# Patient Record
Sex: FEMALE | Race: WHITE
[De-identification: names, ages, dates, MRNs, and addresses within clinical notes are randomized per-mention and may not be internally consistent; named-entity substitution may affect disease eponyms.]

---

## 2017-04-17 ENCOUNTER — HOSPITAL ENCOUNTER (INPATIENT)
Dept: HOSPITAL 15 - ER | Age: 57
LOS: 5 days | Discharge: HOME | DRG: 133 | End: 2017-04-22
Attending: INTERNAL MEDICINE | Admitting: INTERNAL MEDICINE
Payer: MEDICAID

## 2017-04-17 VITALS — WEIGHT: 183.87 LBS | HEIGHT: 63 IN | BODY MASS INDEX: 32.58 KG/M2

## 2017-04-17 VITALS — DIASTOLIC BLOOD PRESSURE: 39 MMHG | SYSTOLIC BLOOD PRESSURE: 109 MMHG

## 2017-04-17 VITALS — SYSTOLIC BLOOD PRESSURE: 119 MMHG | DIASTOLIC BLOOD PRESSURE: 67 MMHG

## 2017-04-17 DIAGNOSIS — F17.210: ICD-10-CM

## 2017-04-17 DIAGNOSIS — Z82.3: ICD-10-CM

## 2017-04-17 DIAGNOSIS — E78.5: ICD-10-CM

## 2017-04-17 DIAGNOSIS — I42.9: ICD-10-CM

## 2017-04-17 DIAGNOSIS — E44.0: ICD-10-CM

## 2017-04-17 DIAGNOSIS — F41.9: ICD-10-CM

## 2017-04-17 DIAGNOSIS — T38.0X5A: ICD-10-CM

## 2017-04-17 DIAGNOSIS — Z88.0: ICD-10-CM

## 2017-04-17 DIAGNOSIS — E11.9: ICD-10-CM

## 2017-04-17 DIAGNOSIS — Z79.891: ICD-10-CM

## 2017-04-17 DIAGNOSIS — J96.20: Primary | ICD-10-CM

## 2017-04-17 DIAGNOSIS — J44.1: ICD-10-CM

## 2017-04-17 DIAGNOSIS — Z71.6: ICD-10-CM

## 2017-04-17 DIAGNOSIS — Z86.73: ICD-10-CM

## 2017-04-17 DIAGNOSIS — I15.9: ICD-10-CM

## 2017-04-17 DIAGNOSIS — Z95.1: ICD-10-CM

## 2017-04-17 DIAGNOSIS — F32.9: ICD-10-CM

## 2017-04-17 DIAGNOSIS — Z91.19: ICD-10-CM

## 2017-04-17 DIAGNOSIS — I11.0: ICD-10-CM

## 2017-04-17 DIAGNOSIS — I48.0: ICD-10-CM

## 2017-04-17 DIAGNOSIS — Z80.9: ICD-10-CM

## 2017-04-17 DIAGNOSIS — G89.29: ICD-10-CM

## 2017-04-17 DIAGNOSIS — Z83.3: ICD-10-CM

## 2017-04-17 DIAGNOSIS — I25.10: ICD-10-CM

## 2017-04-17 DIAGNOSIS — I50.23: ICD-10-CM

## 2017-04-17 LAB
ALBUMIN SERPL-MCNC: 2.9 G/DL (ref 3.4–5)
ALP SERPL-CCNC: 106 U/L (ref 45–117)
ANION GAP SERPL CALCULATED.3IONS-SCNC: 11 MMOL/L (ref 5–15)
BILIRUB SERPL-MCNC: 0.3 MG/DL (ref 0.2–1)
BNP SERPL-MCNC: 249.16 PG/ML (ref 0–100)
BUN SERPL-MCNC: 21 MG/DL (ref 7–18)
BUN/CREAT SERPL: 28.8
CALCIUM SERPL-MCNC: 8.7 MG/DL (ref 8.5–10.1)
CHLORIDE SERPL-SCNC: 108 MMOL/L (ref 98–107)
CO2 SERPL-SCNC: 28 MMOL/L (ref 21–32)
DEFINITIVE: (no result)
GLUCOSE SERPL-MCNC: 121 MG/DL (ref 74–106)
HCT VFR BLD AUTO: 39.6 % (ref 36–46)
HGB BLD-MCNC: 12.9 G/DL (ref 12.2–16.2)
LACTATE PLASV-SCNC: 3.1 MMOL/L (ref 0.4–2)
MAGNESIUM SERPL-MCNC: 2.4 MG/DL (ref 1.6–2.6)
MCH RBC QN AUTO: 26 PG (ref 28–32)
MCV RBC AUTO: 79.7 FL (ref 80–100)
NRBC BLD QL AUTO: 0 %
POTASSIUM SERPL-SCNC: 4.4 MMOL/L (ref 3.5–5.1)
PROT SERPL-MCNC: 7 G/DL (ref 6.4–8.2)
REFLEX LACTIC ACID YES OR NO: YES
SODIUM SERPL-SCNC: 147 MMOL/L (ref 136–145)
TEMPERATURE:: 24.6 C (ref 20–25)

## 2017-04-17 PROCEDURE — 75571 CT HRT W/O DYE W/CA TEST: CPT

## 2017-04-17 PROCEDURE — 85730 THROMBOPLASTIN TIME PARTIAL: CPT

## 2017-04-17 PROCEDURE — 83735 ASSAY OF MAGNESIUM: CPT

## 2017-04-17 PROCEDURE — 96375 TX/PRO/DX INJ NEW DRUG ADDON: CPT

## 2017-04-17 PROCEDURE — 87040 BLOOD CULTURE FOR BACTERIA: CPT

## 2017-04-17 PROCEDURE — 75574 CT ANGIO HRT W/3D IMAGE: CPT

## 2017-04-17 PROCEDURE — 80048 BASIC METABOLIC PNL TOTAL CA: CPT

## 2017-04-17 PROCEDURE — 85610 PROTHROMBIN TIME: CPT

## 2017-04-17 PROCEDURE — 81001 URINALYSIS AUTO W/SCOPE: CPT

## 2017-04-17 PROCEDURE — 96365 THER/PROPH/DIAG IV INF INIT: CPT

## 2017-04-17 PROCEDURE — 83880 ASSAY OF NATRIURETIC PEPTIDE: CPT

## 2017-04-17 PROCEDURE — 36415 COLL VENOUS BLD VENIPUNCTURE: CPT

## 2017-04-17 PROCEDURE — 80053 COMPREHEN METABOLIC PANEL: CPT

## 2017-04-17 PROCEDURE — 83605 ASSAY OF LACTIC ACID: CPT

## 2017-04-17 PROCEDURE — 71020: CPT

## 2017-04-17 PROCEDURE — 94761 N-INVAS EAR/PLS OXIMETRY MLT: CPT

## 2017-04-17 PROCEDURE — 85025 COMPLETE CBC W/AUTO DIFF WBC: CPT

## 2017-04-17 PROCEDURE — 85379 FIBRIN DEGRADATION QUANT: CPT

## 2017-04-17 PROCEDURE — 93005 ELECTROCARDIOGRAM TRACING: CPT

## 2017-04-17 PROCEDURE — 87081 CULTURE SCREEN ONLY: CPT

## 2017-04-17 PROCEDURE — 84484 ASSAY OF TROPONIN QUANT: CPT

## 2017-04-17 PROCEDURE — 93306 TTE W/DOPPLER COMPLETE: CPT

## 2017-04-17 PROCEDURE — 94640 AIRWAY INHALATION TREATMENT: CPT

## 2017-04-17 RX ADMIN — IPRATROPIUM BROMIDE SCH MG: 0.5 SOLUTION RESPIRATORY (INHALATION) at 19:41

## 2017-04-17 RX ADMIN — CARVEDILOL SCH MG: 3.12 TABLET, FILM COATED ORAL at 22:00

## 2017-04-17 RX ADMIN — ALBUTEROL SULFATE SCH MG: 2.5 SOLUTION RESPIRATORY (INHALATION) at 19:41

## 2017-04-17 RX ADMIN — APIXABAN SCH MG: 5 TABLET, FILM COATED ORAL at 22:38

## 2017-04-17 RX ADMIN — HYDROCODONE BITARTRATE AND ACETAMINOPHEN PRN TAB: 10; 325 TABLET ORAL at 22:46

## 2017-04-17 RX ADMIN — METHYLPREDNISOLONE SODIUM SUCCINATE SCH MG: 40 INJECTION, POWDER, FOR SOLUTION INTRAMUSCULAR; INTRAVENOUS at 22:39

## 2017-04-17 RX ADMIN — ATORVASTATIN CALCIUM SCH MG: 20 TABLET, FILM COATED ORAL at 22:38

## 2017-04-18 VITALS — DIASTOLIC BLOOD PRESSURE: 39 MMHG | SYSTOLIC BLOOD PRESSURE: 109 MMHG

## 2017-04-18 VITALS — SYSTOLIC BLOOD PRESSURE: 116 MMHG | DIASTOLIC BLOOD PRESSURE: 74 MMHG

## 2017-04-18 VITALS — DIASTOLIC BLOOD PRESSURE: 63 MMHG | SYSTOLIC BLOOD PRESSURE: 96 MMHG

## 2017-04-18 VITALS — SYSTOLIC BLOOD PRESSURE: 107 MMHG | DIASTOLIC BLOOD PRESSURE: 73 MMHG

## 2017-04-18 VITALS — DIASTOLIC BLOOD PRESSURE: 68 MMHG | SYSTOLIC BLOOD PRESSURE: 115 MMHG

## 2017-04-18 VITALS — SYSTOLIC BLOOD PRESSURE: 96 MMHG | DIASTOLIC BLOOD PRESSURE: 63 MMHG

## 2017-04-18 VITALS — SYSTOLIC BLOOD PRESSURE: 126 MMHG | DIASTOLIC BLOOD PRESSURE: 80 MMHG

## 2017-04-18 LAB
ANION GAP SERPL CALCULATED.3IONS-SCNC: 9 MMOL/L (ref 5–15)
BUN SERPL-MCNC: 18 MG/DL (ref 7–18)
BUN/CREAT SERPL: 20.7
CALCIUM SERPL-MCNC: 8.6 MG/DL (ref 8.5–10.1)
CHLORIDE SERPL-SCNC: 106 MMOL/L (ref 98–107)
CO2 SERPL-SCNC: 27 MMOL/L (ref 21–32)
DEFINITIVE: (no result)
GLUCOSE SERPL-MCNC: 110 MG/DL (ref 74–106)
HCT VFR BLD AUTO: 41 % (ref 36–46)
HGB BLD-MCNC: 13.4 G/DL (ref 12.2–16.2)
MCH RBC QN AUTO: 26 PG (ref 28–32)
MCV RBC AUTO: 79.8 FL (ref 80–100)
NRBC BLD QL AUTO: 0 %
POTASSIUM SERPL-SCNC: 4 MMOL/L (ref 3.5–5.1)
SODIUM SERPL-SCNC: 142 MMOL/L (ref 136–145)
SUSPECT: (no result)

## 2017-04-18 RX ADMIN — APIXABAN SCH MG: 5 TABLET, FILM COATED ORAL at 11:22

## 2017-04-18 RX ADMIN — METHYLPREDNISOLONE SODIUM SUCCINATE SCH MG: 40 INJECTION, POWDER, FOR SOLUTION INTRAMUSCULAR; INTRAVENOUS at 11:21

## 2017-04-18 RX ADMIN — CARVEDILOL SCH MG: 3.12 TABLET, FILM COATED ORAL at 14:19

## 2017-04-18 RX ADMIN — CEFTRIAXONE SODIUM SCH MLS/HR: 1 INJECTION, POWDER, FOR SOLUTION INTRAMUSCULAR; INTRAVENOUS at 09:01

## 2017-04-18 RX ADMIN — ATORVASTATIN CALCIUM SCH MG: 20 TABLET, FILM COATED ORAL at 21:56

## 2017-04-18 RX ADMIN — CARVEDILOL SCH MG: 3.12 TABLET, FILM COATED ORAL at 21:56

## 2017-04-18 RX ADMIN — IPRATROPIUM BROMIDE SCH MG: 0.5 SOLUTION RESPIRATORY (INHALATION) at 14:26

## 2017-04-18 RX ADMIN — METHYLPREDNISOLONE SODIUM SUCCINATE SCH MG: 40 INJECTION, POWDER, FOR SOLUTION INTRAMUSCULAR; INTRAVENOUS at 21:56

## 2017-04-18 RX ADMIN — ALBUTEROL SULFATE SCH MG: 2.5 SOLUTION RESPIRATORY (INHALATION) at 18:40

## 2017-04-18 RX ADMIN — ALBUTEROL SULFATE SCH MG: 2.5 SOLUTION RESPIRATORY (INHALATION) at 14:26

## 2017-04-18 RX ADMIN — IPRATROPIUM BROMIDE SCH MG: 0.5 SOLUTION RESPIRATORY (INHALATION) at 05:58

## 2017-04-18 RX ADMIN — ALBUTEROL SULFATE SCH MG: 2.5 SOLUTION RESPIRATORY (INHALATION) at 05:59

## 2017-04-18 RX ADMIN — CARVEDILOL SCH MG: 3.12 TABLET, FILM COATED ORAL at 14:23

## 2017-04-18 RX ADMIN — FUROSEMIDE SCH MG: 10 INJECTION, SOLUTION INTRAMUSCULAR; INTRAVENOUS at 11:28

## 2017-04-18 RX ADMIN — LOSARTAN POTASSIUM SCH MG: 25 TABLET, FILM COATED ORAL at 11:29

## 2017-04-18 RX ADMIN — IPRATROPIUM BROMIDE SCH MG: 0.5 SOLUTION RESPIRATORY (INHALATION) at 10:00

## 2017-04-18 RX ADMIN — RANOLAZINE SCH MG: 500 TABLET, FILM COATED, EXTENDED RELEASE ORAL at 21:56

## 2017-04-18 RX ADMIN — RANOLAZINE SCH MG: 500 TABLET, FILM COATED, EXTENDED RELEASE ORAL at 11:23

## 2017-04-18 RX ADMIN — IPRATROPIUM BROMIDE SCH MG: 0.5 SOLUTION RESPIRATORY (INHALATION) at 18:40

## 2017-04-18 RX ADMIN — ALBUTEROL SULFATE SCH MG: 2.5 SOLUTION RESPIRATORY (INHALATION) at 10:00

## 2017-04-18 RX ADMIN — POTASSIUM CHLORIDE SCH MEQ: 1500 TABLET, EXTENDED RELEASE ORAL at 11:23

## 2017-04-18 RX ADMIN — HYDROCODONE BITARTRATE AND ACETAMINOPHEN PRN TAB: 10; 325 TABLET ORAL at 22:13

## 2017-04-18 RX ADMIN — AZITHROMYCIN DIHYDRATE SCH MLS/HR: 500 INJECTION, POWDER, LYOPHILIZED, FOR SOLUTION INTRAVENOUS at 11:21

## 2017-04-18 RX ADMIN — APIXABAN SCH MG: 5 TABLET, FILM COATED ORAL at 21:56

## 2017-04-18 RX ADMIN — CARVEDILOL SCH MG: 3.12 TABLET, FILM COATED ORAL at 11:28

## 2017-04-18 RX ADMIN — HYDROCODONE BITARTRATE AND ACETAMINOPHEN PRN TAB: 10; 325 TABLET ORAL at 14:28

## 2017-04-19 VITALS — DIASTOLIC BLOOD PRESSURE: 55 MMHG | SYSTOLIC BLOOD PRESSURE: 111 MMHG

## 2017-04-19 VITALS — SYSTOLIC BLOOD PRESSURE: 122 MMHG | DIASTOLIC BLOOD PRESSURE: 70 MMHG

## 2017-04-19 VITALS — DIASTOLIC BLOOD PRESSURE: 77 MMHG | SYSTOLIC BLOOD PRESSURE: 116 MMHG

## 2017-04-19 VITALS — SYSTOLIC BLOOD PRESSURE: 98 MMHG | DIASTOLIC BLOOD PRESSURE: 53 MMHG

## 2017-04-19 VITALS — DIASTOLIC BLOOD PRESSURE: 74 MMHG | SYSTOLIC BLOOD PRESSURE: 124 MMHG

## 2017-04-19 VITALS — DIASTOLIC BLOOD PRESSURE: 53 MMHG | SYSTOLIC BLOOD PRESSURE: 98 MMHG

## 2017-04-19 LAB
ANION GAP SERPL CALCULATED.3IONS-SCNC: 9 MMOL/L (ref 5–15)
BUN SERPL-MCNC: 22 MG/DL (ref 7–18)
BUN/CREAT SERPL: 27.8
CALCIUM SERPL-MCNC: 8.5 MG/DL (ref 8.5–10.1)
CHLORIDE SERPL-SCNC: 101 MMOL/L (ref 98–107)
CO2 SERPL-SCNC: 32 MMOL/L (ref 21–32)
DEFINITIVE: (no result)
GLUCOSE SERPL-MCNC: 137 MG/DL (ref 74–106)
HCT VFR BLD AUTO: 41.1 % (ref 36–46)
HGB BLD-MCNC: 13.2 G/DL (ref 12.2–16.2)
MCH RBC QN AUTO: 26 PG (ref 28–32)
MCV RBC AUTO: 80.6 FL (ref 80–100)
NRBC BLD QL AUTO: 0 %
POTASSIUM SERPL-SCNC: 4 MMOL/L (ref 3.5–5.1)
SODIUM SERPL-SCNC: 142 MMOL/L (ref 136–145)

## 2017-04-19 RX ADMIN — APIXABAN SCH MG: 5 TABLET, FILM COATED ORAL at 21:04

## 2017-04-19 RX ADMIN — IPRATROPIUM BROMIDE SCH MG: 0.5 SOLUTION RESPIRATORY (INHALATION) at 14:32

## 2017-04-19 RX ADMIN — ALBUTEROL SULFATE SCH MG: 2.5 SOLUTION RESPIRATORY (INHALATION) at 09:49

## 2017-04-19 RX ADMIN — ATORVASTATIN CALCIUM SCH MG: 20 TABLET, FILM COATED ORAL at 21:04

## 2017-04-19 RX ADMIN — METHYLPREDNISOLONE SODIUM SUCCINATE SCH MG: 40 INJECTION, POWDER, FOR SOLUTION INTRAMUSCULAR; INTRAVENOUS at 09:05

## 2017-04-19 RX ADMIN — FUROSEMIDE SCH MG: 10 INJECTION, SOLUTION INTRAMUSCULAR; INTRAVENOUS at 09:05

## 2017-04-19 RX ADMIN — ALBUTEROL SULFATE SCH MG: 2.5 SOLUTION RESPIRATORY (INHALATION) at 05:52

## 2017-04-19 RX ADMIN — RANOLAZINE SCH MG: 500 TABLET, FILM COATED, EXTENDED RELEASE ORAL at 09:16

## 2017-04-19 RX ADMIN — HYDROCODONE BITARTRATE AND ACETAMINOPHEN PRN TAB: 10; 325 TABLET ORAL at 05:52

## 2017-04-19 RX ADMIN — POTASSIUM CHLORIDE SCH MEQ: 1500 TABLET, EXTENDED RELEASE ORAL at 09:04

## 2017-04-19 RX ADMIN — CARVEDILOL SCH MG: 3.12 TABLET, FILM COATED ORAL at 09:06

## 2017-04-19 RX ADMIN — HYDROCODONE BITARTRATE AND ACETAMINOPHEN PRN TAB: 10; 325 TABLET ORAL at 21:05

## 2017-04-19 RX ADMIN — IPRATROPIUM BROMIDE SCH MG: 0.5 SOLUTION RESPIRATORY (INHALATION) at 05:52

## 2017-04-19 RX ADMIN — LOSARTAN POTASSIUM SCH MG: 25 TABLET, FILM COATED ORAL at 10:00

## 2017-04-19 RX ADMIN — ALBUTEROL SULFATE SCH MG: 2.5 SOLUTION RESPIRATORY (INHALATION) at 14:33

## 2017-04-19 RX ADMIN — ALBUTEROL SULFATE SCH MG: 2.5 SOLUTION RESPIRATORY (INHALATION) at 19:36

## 2017-04-19 RX ADMIN — APIXABAN SCH MG: 5 TABLET, FILM COATED ORAL at 09:05

## 2017-04-19 RX ADMIN — RANOLAZINE SCH MG: 500 TABLET, FILM COATED, EXTENDED RELEASE ORAL at 21:04

## 2017-04-19 RX ADMIN — AZITHROMYCIN DIHYDRATE SCH MLS/HR: 500 INJECTION, POWDER, LYOPHILIZED, FOR SOLUTION INTRAVENOUS at 09:19

## 2017-04-19 RX ADMIN — IPRATROPIUM BROMIDE SCH MG: 0.5 SOLUTION RESPIRATORY (INHALATION) at 09:49

## 2017-04-19 RX ADMIN — CARVEDILOL SCH MG: 3.12 TABLET, FILM COATED ORAL at 21:04

## 2017-04-19 RX ADMIN — IPRATROPIUM BROMIDE SCH MG: 0.5 SOLUTION RESPIRATORY (INHALATION) at 19:36

## 2017-04-19 RX ADMIN — CEFTRIAXONE SODIUM SCH MLS/HR: 1 INJECTION, POWDER, FOR SOLUTION INTRAMUSCULAR; INTRAVENOUS at 09:05

## 2017-04-19 RX ADMIN — METHYLPREDNISOLONE SODIUM SUCCINATE SCH MG: 40 INJECTION, POWDER, FOR SOLUTION INTRAMUSCULAR; INTRAVENOUS at 21:05

## 2017-04-20 VITALS — DIASTOLIC BLOOD PRESSURE: 61 MMHG | SYSTOLIC BLOOD PRESSURE: 123 MMHG

## 2017-04-20 VITALS — SYSTOLIC BLOOD PRESSURE: 134 MMHG | DIASTOLIC BLOOD PRESSURE: 79 MMHG

## 2017-04-20 VITALS — SYSTOLIC BLOOD PRESSURE: 117 MMHG | DIASTOLIC BLOOD PRESSURE: 71 MMHG

## 2017-04-20 VITALS — DIASTOLIC BLOOD PRESSURE: 62 MMHG | SYSTOLIC BLOOD PRESSURE: 105 MMHG

## 2017-04-20 VITALS — DIASTOLIC BLOOD PRESSURE: 63 MMHG | SYSTOLIC BLOOD PRESSURE: 138 MMHG

## 2017-04-20 VITALS — SYSTOLIC BLOOD PRESSURE: 123 MMHG | DIASTOLIC BLOOD PRESSURE: 61 MMHG

## 2017-04-20 RX ADMIN — IPRATROPIUM BROMIDE SCH MG: 0.5 SOLUTION RESPIRATORY (INHALATION) at 14:30

## 2017-04-20 RX ADMIN — IPRATROPIUM BROMIDE SCH MG: 0.5 SOLUTION RESPIRATORY (INHALATION) at 10:45

## 2017-04-20 RX ADMIN — METHYLPREDNISOLONE SODIUM SUCCINATE SCH MG: 40 INJECTION, POWDER, FOR SOLUTION INTRAMUSCULAR; INTRAVENOUS at 22:01

## 2017-04-20 RX ADMIN — CARVEDILOL SCH MG: 3.12 TABLET, FILM COATED ORAL at 08:38

## 2017-04-20 RX ADMIN — ALBUTEROL SULFATE SCH MG: 2.5 SOLUTION RESPIRATORY (INHALATION) at 10:45

## 2017-04-20 RX ADMIN — ATORVASTATIN CALCIUM SCH MG: 20 TABLET, FILM COATED ORAL at 22:02

## 2017-04-20 RX ADMIN — ALBUTEROL SULFATE SCH MG: 2.5 SOLUTION RESPIRATORY (INHALATION) at 14:30

## 2017-04-20 RX ADMIN — RANOLAZINE SCH MG: 500 TABLET, FILM COATED, EXTENDED RELEASE ORAL at 08:39

## 2017-04-20 RX ADMIN — IPRATROPIUM BROMIDE SCH MG: 0.5 SOLUTION RESPIRATORY (INHALATION) at 05:57

## 2017-04-20 RX ADMIN — METHYLPREDNISOLONE SODIUM SUCCINATE SCH MG: 40 INJECTION, POWDER, FOR SOLUTION INTRAMUSCULAR; INTRAVENOUS at 08:38

## 2017-04-20 RX ADMIN — FUROSEMIDE SCH MG: 10 INJECTION, SOLUTION INTRAMUSCULAR; INTRAVENOUS at 08:38

## 2017-04-20 RX ADMIN — RANOLAZINE SCH MG: 500 TABLET, FILM COATED, EXTENDED RELEASE ORAL at 22:02

## 2017-04-20 RX ADMIN — HYDROCODONE BITARTRATE AND ACETAMINOPHEN PRN TAB: 10; 325 TABLET ORAL at 23:56

## 2017-04-20 RX ADMIN — LOSARTAN POTASSIUM SCH MG: 25 TABLET, FILM COATED ORAL at 08:37

## 2017-04-20 RX ADMIN — ALBUTEROL SULFATE SCH MG: 2.5 SOLUTION RESPIRATORY (INHALATION) at 05:57

## 2017-04-20 RX ADMIN — APIXABAN SCH MG: 5 TABLET, FILM COATED ORAL at 08:37

## 2017-04-20 RX ADMIN — APIXABAN SCH MG: 5 TABLET, FILM COATED ORAL at 22:00

## 2017-04-20 RX ADMIN — CARVEDILOL SCH MG: 3.12 TABLET, FILM COATED ORAL at 22:02

## 2017-04-20 RX ADMIN — CEFTRIAXONE SODIUM SCH MLS/HR: 1 INJECTION, POWDER, FOR SOLUTION INTRAMUSCULAR; INTRAVENOUS at 08:36

## 2017-04-20 RX ADMIN — HYDROCODONE BITARTRATE AND ACETAMINOPHEN PRN TAB: 10; 325 TABLET ORAL at 12:24

## 2017-04-20 RX ADMIN — POTASSIUM CHLORIDE SCH MEQ: 1500 TABLET, EXTENDED RELEASE ORAL at 08:38

## 2017-04-20 RX ADMIN — AZITHROMYCIN DIHYDRATE SCH MLS/HR: 500 INJECTION, POWDER, LYOPHILIZED, FOR SOLUTION INTRAVENOUS at 11:02

## 2017-04-21 VITALS — DIASTOLIC BLOOD PRESSURE: 53 MMHG | SYSTOLIC BLOOD PRESSURE: 121 MMHG

## 2017-04-21 VITALS — SYSTOLIC BLOOD PRESSURE: 97 MMHG | DIASTOLIC BLOOD PRESSURE: 55 MMHG

## 2017-04-21 VITALS — DIASTOLIC BLOOD PRESSURE: 49 MMHG | SYSTOLIC BLOOD PRESSURE: 132 MMHG

## 2017-04-21 VITALS — SYSTOLIC BLOOD PRESSURE: 103 MMHG | DIASTOLIC BLOOD PRESSURE: 56 MMHG

## 2017-04-21 VITALS — SYSTOLIC BLOOD PRESSURE: 119 MMHG | DIASTOLIC BLOOD PRESSURE: 67 MMHG

## 2017-04-21 LAB
ANION GAP SERPL CALCULATED.3IONS-SCNC: 5 MMOL/L (ref 5–15)
APTT PPP: 25.3 SEC (ref 22.64–33.71)
BUN SERPL-MCNC: 25 MG/DL (ref 7–18)
BUN/CREAT SERPL: 36.2
CALCIUM SERPL-MCNC: 8.5 MG/DL (ref 8.5–10.1)
CHLORIDE SERPL-SCNC: 100 MMOL/L (ref 98–107)
CO2 SERPL-SCNC: 34 MMOL/L (ref 21–32)
DEFINITIVE: (no result)
GLUCOSE SERPL-MCNC: 192 MG/DL (ref 74–106)
HCT VFR BLD AUTO: 43 % (ref 36–46)
HGB BLD-MCNC: 13.9 G/DL (ref 12.2–16.2)
INR PPP: 0.98 (ref 0.9–1.15)
MCH RBC QN AUTO: 26.1 PG (ref 28–32)
MCV RBC AUTO: 80.6 FL (ref 80–100)
NRBC BLD QL AUTO: 0 %
POTASSIUM SERPL-SCNC: 4.4 MMOL/L (ref 3.5–5.1)
PROTHROMBIN TIME: 10.6 SEC (ref 9.37–12.3)
SODIUM SERPL-SCNC: 139 MMOL/L (ref 136–145)

## 2017-04-21 RX ADMIN — METHYLPREDNISOLONE SODIUM SUCCINATE SCH MG: 40 INJECTION, POWDER, FOR SOLUTION INTRAMUSCULAR; INTRAVENOUS at 10:18

## 2017-04-21 RX ADMIN — AZITHROMYCIN DIHYDRATE SCH MLS/HR: 500 INJECTION, POWDER, LYOPHILIZED, FOR SOLUTION INTRAVENOUS at 10:19

## 2017-04-21 RX ADMIN — IPRATROPIUM BROMIDE SCH MG: 0.5 SOLUTION RESPIRATORY (INHALATION) at 06:24

## 2017-04-21 RX ADMIN — ALBUTEROL SULFATE SCH MG: 2.5 SOLUTION RESPIRATORY (INHALATION) at 06:25

## 2017-04-21 RX ADMIN — APIXABAN SCH MG: 5 TABLET, FILM COATED ORAL at 22:19

## 2017-04-21 RX ADMIN — IPRATROPIUM BROMIDE SCH MG: 0.5 SOLUTION RESPIRATORY (INHALATION) at 18:00

## 2017-04-21 RX ADMIN — RANOLAZINE SCH MG: 500 TABLET, FILM COATED, EXTENDED RELEASE ORAL at 10:18

## 2017-04-21 RX ADMIN — FUROSEMIDE SCH MG: 10 INJECTION, SOLUTION INTRAMUSCULAR; INTRAVENOUS at 10:17

## 2017-04-21 RX ADMIN — CARVEDILOL SCH MG: 3.12 TABLET, FILM COATED ORAL at 22:18

## 2017-04-21 RX ADMIN — ATORVASTATIN CALCIUM SCH MG: 20 TABLET, FILM COATED ORAL at 22:19

## 2017-04-21 RX ADMIN — HYDROCODONE BITARTRATE AND ACETAMINOPHEN PRN TAB: 10; 325 TABLET ORAL at 19:24

## 2017-04-21 RX ADMIN — CEFTRIAXONE SODIUM SCH MLS/HR: 1 INJECTION, POWDER, FOR SOLUTION INTRAMUSCULAR; INTRAVENOUS at 08:29

## 2017-04-21 RX ADMIN — METHYLPREDNISOLONE SODIUM SUCCINATE SCH MG: 40 INJECTION, POWDER, FOR SOLUTION INTRAMUSCULAR; INTRAVENOUS at 22:19

## 2017-04-21 RX ADMIN — APIXABAN SCH MG: 5 TABLET, FILM COATED ORAL at 10:18

## 2017-04-21 RX ADMIN — ALBUTEROL SULFATE SCH MG: 2.5 SOLUTION RESPIRATORY (INHALATION) at 10:30

## 2017-04-21 RX ADMIN — ALBUTEROL SULFATE SCH MG: 2.5 SOLUTION RESPIRATORY (INHALATION) at 18:00

## 2017-04-21 RX ADMIN — LOSARTAN POTASSIUM SCH MG: 25 TABLET, FILM COATED ORAL at 10:18

## 2017-04-21 RX ADMIN — IPRATROPIUM BROMIDE SCH MG: 0.5 SOLUTION RESPIRATORY (INHALATION) at 10:30

## 2017-04-21 RX ADMIN — POTASSIUM CHLORIDE SCH MEQ: 1500 TABLET, EXTENDED RELEASE ORAL at 10:20

## 2017-04-21 RX ADMIN — CARVEDILOL SCH MG: 3.12 TABLET, FILM COATED ORAL at 10:19

## 2017-04-21 RX ADMIN — RANOLAZINE SCH MG: 500 TABLET, FILM COATED, EXTENDED RELEASE ORAL at 22:19

## 2017-04-22 VITALS — DIASTOLIC BLOOD PRESSURE: 62 MMHG | SYSTOLIC BLOOD PRESSURE: 109 MMHG

## 2017-04-22 VITALS — DIASTOLIC BLOOD PRESSURE: 53 MMHG | SYSTOLIC BLOOD PRESSURE: 101 MMHG

## 2017-04-22 VITALS — SYSTOLIC BLOOD PRESSURE: 101 MMHG | DIASTOLIC BLOOD PRESSURE: 53 MMHG

## 2017-04-22 VITALS — SYSTOLIC BLOOD PRESSURE: 100 MMHG | DIASTOLIC BLOOD PRESSURE: 61 MMHG

## 2017-04-22 LAB
DEFINITIVE: (no result)
HCT VFR BLD AUTO: 44 % (ref 36–46)
HGB BLD-MCNC: 14.4 G/DL (ref 12.2–16.2)
MCH RBC QN AUTO: 26.1 PG (ref 28–32)
MCV RBC AUTO: 79.7 FL (ref 80–100)
NRBC BLD QL AUTO: 0 %
SUSPECT: (no result)

## 2017-04-22 RX ADMIN — ALBUTEROL SULFATE SCH MG: 2.5 SOLUTION RESPIRATORY (INHALATION) at 14:24

## 2017-04-22 RX ADMIN — ALBUTEROL SULFATE SCH MG: 2.5 SOLUTION RESPIRATORY (INHALATION) at 06:53

## 2017-04-22 RX ADMIN — IPRATROPIUM BROMIDE SCH MG: 0.5 SOLUTION RESPIRATORY (INHALATION) at 14:24

## 2017-04-22 RX ADMIN — ALBUTEROL SULFATE SCH MG: 2.5 SOLUTION RESPIRATORY (INHALATION) at 10:32

## 2017-04-22 RX ADMIN — CARVEDILOL SCH MG: 3.12 TABLET, FILM COATED ORAL at 09:35

## 2017-04-22 RX ADMIN — RANOLAZINE SCH MG: 500 TABLET, FILM COATED, EXTENDED RELEASE ORAL at 09:34

## 2017-04-22 RX ADMIN — AZITHROMYCIN DIHYDRATE SCH MLS/HR: 500 INJECTION, POWDER, LYOPHILIZED, FOR SOLUTION INTRAVENOUS at 09:35

## 2017-04-22 RX ADMIN — LOSARTAN POTASSIUM SCH MG: 25 TABLET, FILM COATED ORAL at 09:35

## 2017-04-22 RX ADMIN — FUROSEMIDE SCH MG: 10 INJECTION, SOLUTION INTRAMUSCULAR; INTRAVENOUS at 09:33

## 2017-04-22 RX ADMIN — IPRATROPIUM BROMIDE SCH MG: 0.5 SOLUTION RESPIRATORY (INHALATION) at 10:32

## 2017-04-22 RX ADMIN — HYDROCODONE BITARTRATE AND ACETAMINOPHEN PRN TAB: 10; 325 TABLET ORAL at 09:32

## 2017-04-22 RX ADMIN — POTASSIUM CHLORIDE SCH MEQ: 1500 TABLET, EXTENDED RELEASE ORAL at 09:33

## 2017-04-22 RX ADMIN — METHYLPREDNISOLONE SODIUM SUCCINATE SCH MG: 40 INJECTION, POWDER, FOR SOLUTION INTRAMUSCULAR; INTRAVENOUS at 09:33

## 2017-04-22 RX ADMIN — IPRATROPIUM BROMIDE SCH MG: 0.5 SOLUTION RESPIRATORY (INHALATION) at 06:53

## 2017-04-22 RX ADMIN — APIXABAN SCH MG: 5 TABLET, FILM COATED ORAL at 09:34

## 2017-04-22 RX ADMIN — CEFTRIAXONE SODIUM SCH MLS/HR: 1 INJECTION, POWDER, FOR SOLUTION INTRAMUSCULAR; INTRAVENOUS at 08:38

## 2017-05-03 ENCOUNTER — HOSPITAL ENCOUNTER (INPATIENT)
Dept: HOSPITAL 15 - ER | Age: 57
LOS: 2 days | Discharge: HOME | DRG: 133 | End: 2017-05-05
Attending: INTERNAL MEDICINE | Admitting: INTERNAL MEDICINE
Payer: MEDICAID

## 2017-05-03 VITALS — SYSTOLIC BLOOD PRESSURE: 114 MMHG | DIASTOLIC BLOOD PRESSURE: 64 MMHG

## 2017-05-03 VITALS — HEIGHT: 63 IN | WEIGHT: 181.44 LBS | BODY MASS INDEX: 32.15 KG/M2

## 2017-05-03 DIAGNOSIS — D68.69: ICD-10-CM

## 2017-05-03 DIAGNOSIS — E44.1: ICD-10-CM

## 2017-05-03 DIAGNOSIS — F17.210: ICD-10-CM

## 2017-05-03 DIAGNOSIS — G89.29: ICD-10-CM

## 2017-05-03 DIAGNOSIS — I50.23: ICD-10-CM

## 2017-05-03 DIAGNOSIS — Z88.0: ICD-10-CM

## 2017-05-03 DIAGNOSIS — I48.0: ICD-10-CM

## 2017-05-03 DIAGNOSIS — Z83.3: ICD-10-CM

## 2017-05-03 DIAGNOSIS — D68.59: ICD-10-CM

## 2017-05-03 DIAGNOSIS — Z80.9: ICD-10-CM

## 2017-05-03 DIAGNOSIS — I25.10: ICD-10-CM

## 2017-05-03 DIAGNOSIS — R07.9: ICD-10-CM

## 2017-05-03 DIAGNOSIS — J44.1: ICD-10-CM

## 2017-05-03 DIAGNOSIS — J96.00: Primary | ICD-10-CM

## 2017-05-03 DIAGNOSIS — Z82.3: ICD-10-CM

## 2017-05-03 DIAGNOSIS — I11.0: ICD-10-CM

## 2017-05-03 DIAGNOSIS — F32.9: ICD-10-CM

## 2017-05-03 DIAGNOSIS — E78.5: ICD-10-CM

## 2017-05-03 LAB
ALBUMIN SERPL-MCNC: 3.1 G/DL (ref 3.4–5)
ALP SERPL-CCNC: 87 U/L (ref 45–117)
ANION GAP SERPL CALCULATED.3IONS-SCNC: 10 MMOL/L (ref 5–15)
BILIRUB SERPL-MCNC: 0.6 MG/DL (ref 0.2–1)
BNP SERPL-MCNC: 121.18 PG/ML (ref 0–100)
BUN SERPL-MCNC: 14 MG/DL (ref 7–18)
BUN/CREAT SERPL: 17.5
CALCIUM SERPL-MCNC: 8.3 MG/DL (ref 8.5–10.1)
CHLORIDE SERPL-SCNC: 111 MMOL/L (ref 98–107)
CO2 SERPL-SCNC: 23 MMOL/L (ref 21–32)
DEFINITIVE: (no result)
GLUCOSE SERPL-MCNC: 85 MG/DL (ref 74–106)
HCT VFR BLD AUTO: 40.1 % (ref 36–46)
HGB BLD-MCNC: 12.9 G/DL (ref 12.2–16.2)
MAGNESIUM SERPL-MCNC: 2.2 MG/DL (ref 1.6–2.6)
MCH RBC QN AUTO: 25.8 PG (ref 28–32)
MCV RBC AUTO: 80.3 FL (ref 80–100)
NRBC BLD QL AUTO: 0 %
POTASSIUM SERPL-SCNC: 4 MMOL/L (ref 3.5–5.1)
PROT SERPL-MCNC: 6.5 G/DL (ref 6.4–8.2)
SODIUM SERPL-SCNC: 144 MMOL/L (ref 136–145)
TEMPERATURE:: 23.4 C (ref 20–25)

## 2017-05-03 PROCEDURE — 80061 LIPID PANEL: CPT

## 2017-05-03 PROCEDURE — 71020: CPT

## 2017-05-03 PROCEDURE — 85379 FIBRIN DEGRADATION QUANT: CPT

## 2017-05-03 PROCEDURE — 94640 AIRWAY INHALATION TREATMENT: CPT

## 2017-05-03 PROCEDURE — 86141 C-REACTIVE PROTEIN HS: CPT

## 2017-05-03 PROCEDURE — 84443 ASSAY THYROID STIM HORMONE: CPT

## 2017-05-03 PROCEDURE — 82550 ASSAY OF CK (CPK): CPT

## 2017-05-03 PROCEDURE — 80053 COMPREHEN METABOLIC PANEL: CPT

## 2017-05-03 PROCEDURE — 87040 BLOOD CULTURE FOR BACTERIA: CPT

## 2017-05-03 PROCEDURE — 94761 N-INVAS EAR/PLS OXIMETRY MLT: CPT

## 2017-05-03 PROCEDURE — 84484 ASSAY OF TROPONIN QUANT: CPT

## 2017-05-03 PROCEDURE — 83880 ASSAY OF NATRIURETIC PEPTIDE: CPT

## 2017-05-03 PROCEDURE — 85025 COMPLETE CBC W/AUTO DIFF WBC: CPT

## 2017-05-03 PROCEDURE — 87081 CULTURE SCREEN ONLY: CPT

## 2017-05-03 PROCEDURE — 93005 ELECTROCARDIOGRAM TRACING: CPT

## 2017-05-03 PROCEDURE — 83735 ASSAY OF MAGNESIUM: CPT

## 2017-05-03 PROCEDURE — 85652 RBC SED RATE AUTOMATED: CPT

## 2017-05-03 PROCEDURE — 80048 BASIC METABOLIC PNL TOTAL CA: CPT

## 2017-05-03 PROCEDURE — 36415 COLL VENOUS BLD VENIPUNCTURE: CPT

## 2017-05-03 RX ADMIN — SODIUM CHLORIDE SCH ML: 9 INJECTION INTRAMUSCULAR; INTRAVENOUS; SUBCUTANEOUS at 22:00

## 2017-05-03 RX ADMIN — NITROGLYCERIN SCH PATCH: 0.2 PATCH TRANSDERMAL at 20:34

## 2017-05-03 RX ADMIN — CARVEDILOL SCH MG: 3.12 TABLET, FILM COATED ORAL at 22:00

## 2017-05-03 RX ADMIN — ATORVASTATIN CALCIUM SCH MG: 20 TABLET, FILM COATED ORAL at 22:57

## 2017-05-03 RX ADMIN — RANOLAZINE SCH MG: 500 TABLET, FILM COATED, EXTENDED RELEASE ORAL at 22:58

## 2017-05-04 VITALS — SYSTOLIC BLOOD PRESSURE: 93 MMHG | DIASTOLIC BLOOD PRESSURE: 52 MMHG

## 2017-05-04 VITALS — DIASTOLIC BLOOD PRESSURE: 64 MMHG | SYSTOLIC BLOOD PRESSURE: 114 MMHG

## 2017-05-04 VITALS — DIASTOLIC BLOOD PRESSURE: 74 MMHG | SYSTOLIC BLOOD PRESSURE: 121 MMHG

## 2017-05-04 VITALS — SYSTOLIC BLOOD PRESSURE: 108 MMHG | DIASTOLIC BLOOD PRESSURE: 66 MMHG

## 2017-05-04 VITALS — DIASTOLIC BLOOD PRESSURE: 66 MMHG | SYSTOLIC BLOOD PRESSURE: 108 MMHG

## 2017-05-04 VITALS — SYSTOLIC BLOOD PRESSURE: 108 MMHG | DIASTOLIC BLOOD PRESSURE: 70 MMHG

## 2017-05-04 VITALS — SYSTOLIC BLOOD PRESSURE: 111 MMHG | DIASTOLIC BLOOD PRESSURE: 63 MMHG

## 2017-05-04 LAB
BNP SERPL-MCNC: 228.91 PG/ML (ref 0–100)
CHOLEST SERPL-MCNC: 122 MG/DL (ref ?–200)
DEFINITIVE: (no result)
HCT VFR BLD AUTO: 38.9 % (ref 36–46)
HDLC SERPL-MCNC: 40 MG/DL (ref 40–59)
HGB BLD-MCNC: 12.6 G/DL (ref 12.2–16.2)
MCH RBC QN AUTO: 25.9 PG (ref 28–32)
MCV RBC AUTO: 80 FL (ref 80–100)
NRBC BLD QL AUTO: 0 %
TEMPERATURE:: 24 C (ref 20–25)
TRIGL SERPL-MCNC: 156 MG/DL (ref ?–150)

## 2017-05-04 RX ADMIN — IPRATROPIUM BROMIDE SCH MG: 0.5 SOLUTION RESPIRATORY (INHALATION) at 23:56

## 2017-05-04 RX ADMIN — SODIUM CHLORIDE SCH ML: 9 INJECTION INTRAMUSCULAR; INTRAVENOUS; SUBCUTANEOUS at 14:00

## 2017-05-04 RX ADMIN — IPRATROPIUM BROMIDE SCH MG: 0.5 SOLUTION RESPIRATORY (INHALATION) at 01:01

## 2017-05-04 RX ADMIN — RANOLAZINE SCH MG: 500 TABLET, FILM COATED, EXTENDED RELEASE ORAL at 10:00

## 2017-05-04 RX ADMIN — HYDROCODONE BITARTRATE AND ACETAMINOPHEN PRN TAB: 10; 325 TABLET ORAL at 21:48

## 2017-05-04 RX ADMIN — IPRATROPIUM BROMIDE SCH MG: 0.5 SOLUTION RESPIRATORY (INHALATION) at 07:38

## 2017-05-04 RX ADMIN — CARVEDILOL SCH MG: 3.12 TABLET, FILM COATED ORAL at 21:47

## 2017-05-04 RX ADMIN — HYDROCODONE BITARTRATE AND ACETAMINOPHEN PRN TAB: 10; 325 TABLET ORAL at 09:54

## 2017-05-04 RX ADMIN — ENOXAPARIN SODIUM SCH MG: 40 INJECTION SUBCUTANEOUS at 10:14

## 2017-05-04 RX ADMIN — POTASSIUM CHLORIDE SCH MEQ: 1500 TABLET, EXTENDED RELEASE ORAL at 09:54

## 2017-05-04 RX ADMIN — LEVOFLOXACIN SCH MLS/HR: 5 INJECTION, SOLUTION INTRAVENOUS at 09:54

## 2017-05-04 RX ADMIN — BUDESONIDE SCH MG: 0.5 SUSPENSION RESPIRATORY (INHALATION) at 01:02

## 2017-05-04 RX ADMIN — IPRATROPIUM BROMIDE SCH MG: 0.5 SOLUTION RESPIRATORY (INHALATION) at 19:24

## 2017-05-04 RX ADMIN — CARVEDILOL SCH MG: 3.12 TABLET, FILM COATED ORAL at 09:55

## 2017-05-04 RX ADMIN — ENALAPRIL MALEATE SCH MG: 2.5 TABLET ORAL at 09:56

## 2017-05-04 RX ADMIN — ALBUTEROL SULFATE SCH MG: 2.5 SOLUTION RESPIRATORY (INHALATION) at 19:25

## 2017-05-04 RX ADMIN — RANOLAZINE SCH MG: 500 TABLET, FILM COATED, EXTENDED RELEASE ORAL at 21:46

## 2017-05-04 RX ADMIN — SODIUM CHLORIDE SCH ML: 9 INJECTION INTRAMUSCULAR; INTRAVENOUS; SUBCUTANEOUS at 21:48

## 2017-05-04 RX ADMIN — ALBUTEROL SULFATE SCH MG: 2.5 SOLUTION RESPIRATORY (INHALATION) at 01:01

## 2017-05-04 RX ADMIN — NITROGLYCERIN SCH PATCH: 0.2 PATCH TRANSDERMAL at 09:56

## 2017-05-04 RX ADMIN — BUDESONIDE SCH MG: 0.5 SUSPENSION RESPIRATORY (INHALATION) at 19:25

## 2017-05-04 RX ADMIN — FUROSEMIDE SCH MG: 10 INJECTION, SOLUTION INTRAMUSCULAR; INTRAVENOUS at 09:55

## 2017-05-04 RX ADMIN — ATORVASTATIN CALCIUM SCH MG: 20 TABLET, FILM COATED ORAL at 21:47

## 2017-05-04 RX ADMIN — ALBUTEROL SULFATE SCH MG: 2.5 SOLUTION RESPIRATORY (INHALATION) at 23:56

## 2017-05-04 RX ADMIN — BUDESONIDE SCH MG: 0.5 SUSPENSION RESPIRATORY (INHALATION) at 07:38

## 2017-05-04 RX ADMIN — SODIUM CHLORIDE SCH ML: 9 INJECTION INTRAMUSCULAR; INTRAVENOUS; SUBCUTANEOUS at 06:00

## 2017-05-04 RX ADMIN — ALBUTEROL SULFATE SCH MG: 2.5 SOLUTION RESPIRATORY (INHALATION) at 07:38

## 2017-05-04 RX ADMIN — PANTOPRAZOLE SODIUM SCH MG: 40 TABLET, DELAYED RELEASE ORAL at 09:54

## 2017-05-05 VITALS — SYSTOLIC BLOOD PRESSURE: 103 MMHG | DIASTOLIC BLOOD PRESSURE: 53 MMHG

## 2017-05-05 VITALS — SYSTOLIC BLOOD PRESSURE: 121 MMHG | DIASTOLIC BLOOD PRESSURE: 74 MMHG

## 2017-05-05 VITALS — SYSTOLIC BLOOD PRESSURE: 106 MMHG | DIASTOLIC BLOOD PRESSURE: 63 MMHG

## 2017-05-05 VITALS — SYSTOLIC BLOOD PRESSURE: 85 MMHG | DIASTOLIC BLOOD PRESSURE: 41 MMHG

## 2017-05-05 VITALS — DIASTOLIC BLOOD PRESSURE: 74 MMHG | SYSTOLIC BLOOD PRESSURE: 121 MMHG

## 2017-05-05 LAB
ANION GAP SERPL CALCULATED.3IONS-SCNC: 6 MMOL/L (ref 5–15)
BUN SERPL-MCNC: 12 MG/DL (ref 7–18)
BUN/CREAT SERPL: 14.5
CALCIUM SERPL-MCNC: 8.4 MG/DL (ref 8.5–10.1)
CHLORIDE SERPL-SCNC: 105 MMOL/L (ref 98–107)
CO2 SERPL-SCNC: 30 MMOL/L (ref 21–32)
GLUCOSE SERPL-MCNC: 93 MG/DL (ref 74–106)
POTASSIUM SERPL-SCNC: 4.2 MMOL/L (ref 3.5–5.1)
SODIUM SERPL-SCNC: 141 MMOL/L (ref 136–145)

## 2017-05-05 RX ADMIN — IPRATROPIUM BROMIDE SCH MG: 0.5 SOLUTION RESPIRATORY (INHALATION) at 07:36

## 2017-05-05 RX ADMIN — ENOXAPARIN SODIUM SCH MG: 40 INJECTION SUBCUTANEOUS at 10:47

## 2017-05-05 RX ADMIN — FUROSEMIDE SCH MG: 10 INJECTION, SOLUTION INTRAMUSCULAR; INTRAVENOUS at 10:46

## 2017-05-05 RX ADMIN — ALBUTEROL SULFATE SCH MG: 2.5 SOLUTION RESPIRATORY (INHALATION) at 12:10

## 2017-05-05 RX ADMIN — LEVOFLOXACIN SCH MLS/HR: 5 INJECTION, SOLUTION INTRAVENOUS at 10:44

## 2017-05-05 RX ADMIN — HYDROCODONE BITARTRATE AND ACETAMINOPHEN PRN TAB: 10; 325 TABLET ORAL at 07:44

## 2017-05-05 RX ADMIN — ENALAPRIL MALEATE SCH MG: 2.5 TABLET ORAL at 10:46

## 2017-05-05 RX ADMIN — BUDESONIDE SCH MG: 0.5 SUSPENSION RESPIRATORY (INHALATION) at 07:36

## 2017-05-05 RX ADMIN — IPRATROPIUM BROMIDE SCH MG: 0.5 SOLUTION RESPIRATORY (INHALATION) at 12:10

## 2017-05-05 RX ADMIN — ALBUTEROL SULFATE SCH MG: 2.5 SOLUTION RESPIRATORY (INHALATION) at 07:36

## 2017-05-05 RX ADMIN — SODIUM CHLORIDE SCH ML: 9 INJECTION INTRAMUSCULAR; INTRAVENOUS; SUBCUTANEOUS at 06:16

## 2017-05-05 RX ADMIN — CARVEDILOL SCH MG: 3.12 TABLET, FILM COATED ORAL at 10:45

## 2017-05-05 RX ADMIN — PANTOPRAZOLE SODIUM SCH MG: 40 TABLET, DELAYED RELEASE ORAL at 10:46

## 2017-05-05 RX ADMIN — POTASSIUM CHLORIDE SCH MEQ: 1500 TABLET, EXTENDED RELEASE ORAL at 10:44

## 2018-05-30 ENCOUNTER — HOSPITAL ENCOUNTER (EMERGENCY)
Dept: HOSPITAL 15 - ER | Age: 58
Discharge: LEFT BEFORE DISCHARGE | End: 2018-05-30
Payer: MEDICAID

## 2018-05-30 DIAGNOSIS — R06.02: Primary | ICD-10-CM

## 2018-05-30 DIAGNOSIS — Z53.21: ICD-10-CM

## 2018-09-12 ENCOUNTER — HOSPITAL ENCOUNTER (INPATIENT)
Dept: HOSPITAL 15 - ER | Age: 58
LOS: 28 days | Discharge: HOME | DRG: 130 | End: 2018-10-10
Attending: INTERNAL MEDICINE | Admitting: NURSE PRACTITIONER
Payer: MEDICAID

## 2018-09-12 VITALS — SYSTOLIC BLOOD PRESSURE: 123 MMHG | DIASTOLIC BLOOD PRESSURE: 74 MMHG

## 2018-09-12 VITALS — SYSTOLIC BLOOD PRESSURE: 154 MMHG | DIASTOLIC BLOOD PRESSURE: 133 MMHG

## 2018-09-12 VITALS — BODY MASS INDEX: 25.72 KG/M2 | HEIGHT: 66 IN | WEIGHT: 160.06 LBS

## 2018-09-12 DIAGNOSIS — J44.1: ICD-10-CM

## 2018-09-12 DIAGNOSIS — I48.92: ICD-10-CM

## 2018-09-12 DIAGNOSIS — Z83.3: ICD-10-CM

## 2018-09-12 DIAGNOSIS — Z82.3: ICD-10-CM

## 2018-09-12 DIAGNOSIS — F17.210: ICD-10-CM

## 2018-09-12 DIAGNOSIS — Z95.2: ICD-10-CM

## 2018-09-12 DIAGNOSIS — J18.1: ICD-10-CM

## 2018-09-12 DIAGNOSIS — I48.2: ICD-10-CM

## 2018-09-12 DIAGNOSIS — D68.69: ICD-10-CM

## 2018-09-12 DIAGNOSIS — Z53.9: ICD-10-CM

## 2018-09-12 DIAGNOSIS — Z86.74: ICD-10-CM

## 2018-09-12 DIAGNOSIS — F41.9: ICD-10-CM

## 2018-09-12 DIAGNOSIS — I11.0: ICD-10-CM

## 2018-09-12 DIAGNOSIS — N17.0: ICD-10-CM

## 2018-09-12 DIAGNOSIS — E66.9: ICD-10-CM

## 2018-09-12 DIAGNOSIS — T38.0X5A: ICD-10-CM

## 2018-09-12 DIAGNOSIS — I50.43: ICD-10-CM

## 2018-09-12 DIAGNOSIS — Z88.0: ICD-10-CM

## 2018-09-12 DIAGNOSIS — F14.90: ICD-10-CM

## 2018-09-12 DIAGNOSIS — I25.10: ICD-10-CM

## 2018-09-12 DIAGNOSIS — J44.0: ICD-10-CM

## 2018-09-12 DIAGNOSIS — Z91.19: ICD-10-CM

## 2018-09-12 DIAGNOSIS — F32.9: ICD-10-CM

## 2018-09-12 DIAGNOSIS — I25.5: ICD-10-CM

## 2018-09-12 DIAGNOSIS — J96.20: Primary | ICD-10-CM

## 2018-09-12 DIAGNOSIS — I48.1: ICD-10-CM

## 2018-09-12 DIAGNOSIS — Z79.01: ICD-10-CM

## 2018-09-12 DIAGNOSIS — D69.6: ICD-10-CM

## 2018-09-12 DIAGNOSIS — E78.5: ICD-10-CM

## 2018-09-12 DIAGNOSIS — Z99.81: ICD-10-CM

## 2018-09-12 DIAGNOSIS — J96.21: ICD-10-CM

## 2018-09-12 DIAGNOSIS — E87.5: ICD-10-CM
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
## 2018-09-12 LAB
ALBUMIN SERPL-MCNC: 3.7 G/DL (ref 3.4–5)
ALP SERPL-CCNC: 100 U/L (ref 45–117)
ALT SERPL-CCNC: 503 U/L (ref 13–56)
ANION GAP SERPL CALCULATED.3IONS-SCNC: 4 MMOL/L (ref 5–15)
BILIRUB SERPL-MCNC: 0.8 MG/DL (ref 0.2–1)
BUN SERPL-MCNC: 31 MG/DL (ref 7–18)
BUN/CREAT SERPL: 24.4
CALCIUM SERPL-MCNC: 8 MG/DL (ref 8.5–10.1)
CHLORIDE SERPL-SCNC: 101 MMOL/L (ref 98–107)
CO2 SERPL-SCNC: 30 MMOL/L (ref 21–32)
GLUCOSE SERPL-MCNC: 86 MG/DL (ref 74–106)
HCT VFR BLD AUTO: 48.1 % (ref 36–46)
HGB BLD-MCNC: 15.4 G/DL (ref 12.2–16.2)
MCH RBC QN AUTO: 27 PG (ref 28–32)
MCV RBC AUTO: 84.4 FL (ref 80–100)
NRBC BLD QL AUTO: 0.2 %
POTASSIUM SERPL-SCNC: 3.4 MMOL/L (ref 3.5–5.1)
PROT SERPL-MCNC: 7.1 G/DL (ref 6.4–8.2)
SODIUM SERPL-SCNC: 135 MMOL/L (ref 136–145)

## 2018-09-12 PROCEDURE — 94761 N-INVAS EAR/PLS OXIMETRY MLT: CPT

## 2018-09-12 PROCEDURE — 93306 TTE W/DOPPLER COMPLETE: CPT

## 2018-09-12 PROCEDURE — 36415 COLL VENOUS BLD VENIPUNCTURE: CPT

## 2018-09-12 PROCEDURE — 70490 CT SOFT TISSUE NECK W/O DYE: CPT

## 2018-09-12 PROCEDURE — 84132 ASSAY OF SERUM POTASSIUM: CPT

## 2018-09-12 PROCEDURE — 71250 CT THORAX DX C-: CPT

## 2018-09-12 PROCEDURE — 84100 ASSAY OF PHOSPHORUS: CPT

## 2018-09-12 PROCEDURE — 83605 ASSAY OF LACTIC ACID: CPT

## 2018-09-12 PROCEDURE — 87070 CULTURE OTHR SPECIMN AEROBIC: CPT

## 2018-09-12 PROCEDURE — 87040 BLOOD CULTURE FOR BACTERIA: CPT

## 2018-09-12 PROCEDURE — 87086 URINE CULTURE/COLONY COUNT: CPT

## 2018-09-12 PROCEDURE — 97530 THERAPEUTIC ACTIVITIES: CPT

## 2018-09-12 PROCEDURE — 71045 X-RAY EXAM CHEST 1 VIEW: CPT

## 2018-09-12 PROCEDURE — 85007 BL SMEAR W/DIFF WBC COUNT: CPT

## 2018-09-12 PROCEDURE — 82962 GLUCOSE BLOOD TEST: CPT

## 2018-09-12 PROCEDURE — 86900 BLOOD TYPING SEROLOGIC ABO: CPT

## 2018-09-12 PROCEDURE — 84484 ASSAY OF TROPONIN QUANT: CPT

## 2018-09-12 PROCEDURE — 94003 VENT MGMT INPAT SUBQ DAY: CPT

## 2018-09-12 PROCEDURE — 87081 CULTURE SCREEN ONLY: CPT

## 2018-09-12 PROCEDURE — 87205 SMEAR GRAM STAIN: CPT

## 2018-09-12 PROCEDURE — 84443 ASSAY THYROID STIM HORMONE: CPT

## 2018-09-12 PROCEDURE — 80048 BASIC METABOLIC PNL TOTAL CA: CPT

## 2018-09-12 PROCEDURE — 94002 VENT MGMT INPAT INIT DAY: CPT

## 2018-09-12 PROCEDURE — 99152 MOD SED SAME PHYS/QHP 5/>YRS: CPT

## 2018-09-12 PROCEDURE — 85025 COMPLETE CBC W/AUTO DIFF WBC: CPT

## 2018-09-12 PROCEDURE — 82805 BLOOD GASES W/O2 SATURATION: CPT

## 2018-09-12 PROCEDURE — 81001 URINALYSIS AUTO W/SCOPE: CPT

## 2018-09-12 PROCEDURE — 85610 PROTHROMBIN TIME: CPT

## 2018-09-12 PROCEDURE — 80307 DRUG TEST PRSMV CHEM ANLYZR: CPT

## 2018-09-12 PROCEDURE — 85027 COMPLETE CBC AUTOMATED: CPT

## 2018-09-12 PROCEDURE — 76604 US EXAM CHEST: CPT

## 2018-09-12 PROCEDURE — 85730 THROMBOPLASTIN TIME PARTIAL: CPT

## 2018-09-12 PROCEDURE — 86850 RBC ANTIBODY SCREEN: CPT

## 2018-09-12 PROCEDURE — 94660 CPAP INITIATION&MGMT: CPT

## 2018-09-12 PROCEDURE — 93005 ELECTROCARDIOGRAM TRACING: CPT

## 2018-09-12 PROCEDURE — 83735 ASSAY OF MAGNESIUM: CPT

## 2018-09-12 PROCEDURE — 92610 EVALUATE SWALLOWING FUNCTION: CPT

## 2018-09-12 PROCEDURE — 36600 WITHDRAWAL OF ARTERIAL BLOOD: CPT

## 2018-09-12 PROCEDURE — 80053 COMPREHEN METABOLIC PANEL: CPT

## 2018-09-12 PROCEDURE — 36569 INSJ PICC 5 YR+ W/O IMAGING: CPT

## 2018-09-12 PROCEDURE — 96375 TX/PRO/DX INJ NEW DRUG ADDON: CPT

## 2018-09-12 PROCEDURE — 97110 THERAPEUTIC EXERCISES: CPT

## 2018-09-12 PROCEDURE — 83880 ASSAY OF NATRIURETIC PEPTIDE: CPT

## 2018-09-12 PROCEDURE — 94640 AIRWAY INHALATION TREATMENT: CPT

## 2018-09-12 PROCEDURE — 97163 PT EVAL HIGH COMPLEX 45 MIN: CPT

## 2018-09-12 PROCEDURE — 80076 HEPATIC FUNCTION PANEL: CPT

## 2018-09-12 PROCEDURE — 96374 THER/PROPH/DIAG INJ IV PUSH: CPT

## 2018-09-12 PROCEDURE — 97116 GAIT TRAINING THERAPY: CPT

## 2018-09-12 PROCEDURE — 86901 BLOOD TYPING SEROLOGIC RH(D): CPT

## 2018-09-12 RX ADMIN — CARVEDILOL SCH MG: 3.12 TABLET, FILM COATED ORAL at 22:17

## 2018-09-13 VITALS — SYSTOLIC BLOOD PRESSURE: 140 MMHG | DIASTOLIC BLOOD PRESSURE: 71 MMHG

## 2018-09-13 VITALS — SYSTOLIC BLOOD PRESSURE: 124 MMHG | DIASTOLIC BLOOD PRESSURE: 86 MMHG

## 2018-09-13 VITALS — SYSTOLIC BLOOD PRESSURE: 123 MMHG | DIASTOLIC BLOOD PRESSURE: 74 MMHG

## 2018-09-13 VITALS — DIASTOLIC BLOOD PRESSURE: 74 MMHG | SYSTOLIC BLOOD PRESSURE: 123 MMHG

## 2018-09-13 VITALS — SYSTOLIC BLOOD PRESSURE: 144 MMHG | DIASTOLIC BLOOD PRESSURE: 95 MMHG

## 2018-09-13 VITALS — SYSTOLIC BLOOD PRESSURE: 124 MMHG | DIASTOLIC BLOOD PRESSURE: 83 MMHG

## 2018-09-13 VITALS — DIASTOLIC BLOOD PRESSURE: 75 MMHG | SYSTOLIC BLOOD PRESSURE: 145 MMHG

## 2018-09-13 VITALS — SYSTOLIC BLOOD PRESSURE: 131 MMHG | DIASTOLIC BLOOD PRESSURE: 78 MMHG

## 2018-09-13 LAB
ALBUMIN SERPL-MCNC: 3.3 G/DL (ref 3.4–5)
ALP SERPL-CCNC: 79 U/L (ref 45–117)
ALT SERPL-CCNC: 437 U/L (ref 13–56)
ANION GAP SERPL CALCULATED.3IONS-SCNC: 9 MMOL/L (ref 5–15)
BILIRUB SERPL-MCNC: 0.8 MG/DL (ref 0.2–1)
BUN SERPL-MCNC: 30 MG/DL (ref 7–18)
BUN/CREAT SERPL: 29.7
CALCIUM SERPL-MCNC: 7.8 MG/DL (ref 8.5–10.1)
CHLORIDE SERPL-SCNC: 103 MMOL/L (ref 98–107)
CO2 SERPL-SCNC: 25 MMOL/L (ref 21–32)
GLUCOSE SERPL-MCNC: 141 MG/DL (ref 74–106)
HCT VFR BLD AUTO: 45.8 % (ref 36–46)
HGB BLD-MCNC: 15 G/DL (ref 12.2–16.2)
MCH RBC QN AUTO: 27.6 PG (ref 28–32)
MCV RBC AUTO: 84.2 FL (ref 80–100)
NRBC BLD QL AUTO: 0.2 %
POTASSIUM SERPL-SCNC: 4.4 MMOL/L (ref 3.5–5.1)
PROT SERPL-MCNC: 6.2 G/DL (ref 6.4–8.2)
SODIUM SERPL-SCNC: 137 MMOL/L (ref 136–145)

## 2018-09-13 RX ADMIN — APIXABAN SCH MG: 2.5 TABLET, FILM COATED ORAL at 22:49

## 2018-09-13 RX ADMIN — APIXABAN SCH MG: 2.5 TABLET, FILM COATED ORAL at 10:00

## 2018-09-13 RX ADMIN — ALBUTEROL SULFATE SCH MG: 2.5 SOLUTION RESPIRATORY (INHALATION) at 19:55

## 2018-09-13 RX ADMIN — ONDANSETRON HYDROCHLORIDE PRN MG: 2 INJECTION, SOLUTION INTRAMUSCULAR; INTRAVENOUS at 00:12

## 2018-09-13 RX ADMIN — METHYLPREDNISOLONE SODIUM SUCCINATE SCH MG: 40 INJECTION, POWDER, FOR SOLUTION INTRAMUSCULAR; INTRAVENOUS at 22:54

## 2018-09-13 RX ADMIN — ATORVASTATIN CALCIUM SCH MG: 20 TABLET, FILM COATED ORAL at 00:15

## 2018-09-13 RX ADMIN — IPRATROPIUM BROMIDE SCH MG: 0.5 SOLUTION RESPIRATORY (INHALATION) at 19:54

## 2018-09-13 RX ADMIN — CARVEDILOL SCH MG: 3.12 TABLET, FILM COATED ORAL at 22:53

## 2018-09-13 RX ADMIN — AZITHROMYCIN DIHYDRATE SCH MLS/HR: 500 INJECTION, POWDER, LYOPHILIZED, FOR SOLUTION INTRAVENOUS at 10:00

## 2018-09-13 RX ADMIN — CEFTRIAXONE SODIUM SCH MLS/HR: 1 INJECTION, POWDER, FOR SOLUTION INTRAMUSCULAR; INTRAVENOUS at 09:27

## 2018-09-13 RX ADMIN — CARVEDILOL SCH MG: 3.12 TABLET, FILM COATED ORAL at 11:19

## 2018-09-13 RX ADMIN — ONDANSETRON HYDROCHLORIDE PRN MG: 2 INJECTION, SOLUTION INTRAMUSCULAR; INTRAVENOUS at 22:49

## 2018-09-13 RX ADMIN — ATORVASTATIN CALCIUM SCH MG: 20 TABLET, FILM COATED ORAL at 22:54

## 2018-09-13 RX ADMIN — BUDESONIDE SCH MG: 0.5 SUSPENSION RESPIRATORY (INHALATION) at 19:55

## 2018-09-14 VITALS — SYSTOLIC BLOOD PRESSURE: 140 MMHG | DIASTOLIC BLOOD PRESSURE: 82 MMHG

## 2018-09-14 VITALS — DIASTOLIC BLOOD PRESSURE: 78 MMHG | SYSTOLIC BLOOD PRESSURE: 91 MMHG

## 2018-09-14 VITALS — SYSTOLIC BLOOD PRESSURE: 120 MMHG | DIASTOLIC BLOOD PRESSURE: 63 MMHG

## 2018-09-14 VITALS — SYSTOLIC BLOOD PRESSURE: 107 MMHG | DIASTOLIC BLOOD PRESSURE: 73 MMHG

## 2018-09-14 VITALS — SYSTOLIC BLOOD PRESSURE: 105 MMHG | DIASTOLIC BLOOD PRESSURE: 65 MMHG

## 2018-09-14 LAB
ALBUMIN SERPL-MCNC: 3.4 G/DL (ref 3.4–5)
ALP SERPL-CCNC: 97 U/L (ref 45–117)
ALT SERPL-CCNC: 361 U/L (ref 13–56)
ANION GAP SERPL CALCULATED.3IONS-SCNC: 6 MMOL/L (ref 5–15)
BILIRUB SERPL-MCNC: 0.8 MG/DL (ref 0.2–1)
BUN SERPL-MCNC: 26 MG/DL (ref 7–18)
BUN/CREAT SERPL: 30.6
CALCIUM SERPL-MCNC: 8.1 MG/DL (ref 8.5–10.1)
CHLORIDE SERPL-SCNC: 101 MMOL/L (ref 98–107)
CO2 SERPL-SCNC: 29 MMOL/L (ref 21–32)
GLUCOSE SERPL-MCNC: 177 MG/DL (ref 74–106)
HCT VFR BLD AUTO: 46.9 % (ref 36–46)
HGB BLD-MCNC: 14.9 G/DL (ref 12.2–16.2)
MCH RBC QN AUTO: 27.2 PG (ref 28–32)
MCV RBC AUTO: 85.6 FL (ref 80–100)
NRBC BLD QL AUTO: 1 %
POTASSIUM SERPL-SCNC: 4.8 MMOL/L (ref 3.5–5.1)
PROT SERPL-MCNC: 6.7 G/DL (ref 6.4–8.2)
SODIUM SERPL-SCNC: 136 MMOL/L (ref 136–145)

## 2018-09-14 RX ADMIN — IPRATROPIUM BROMIDE SCH MG: 0.5 SOLUTION RESPIRATORY (INHALATION) at 12:00

## 2018-09-14 RX ADMIN — ALBUTEROL SULFATE SCH MG: 2.5 SOLUTION RESPIRATORY (INHALATION) at 06:37

## 2018-09-14 RX ADMIN — BUDESONIDE SCH MG: 0.5 SUSPENSION RESPIRATORY (INHALATION) at 12:09

## 2018-09-14 RX ADMIN — IPRATROPIUM BROMIDE SCH MG: 0.5 SOLUTION RESPIRATORY (INHALATION) at 19:14

## 2018-09-14 RX ADMIN — ALBUTEROL SULFATE SCH MG: 2.5 SOLUTION RESPIRATORY (INHALATION) at 12:00

## 2018-09-14 RX ADMIN — ALBUTEROL SULFATE SCH MG: 2.5 SOLUTION RESPIRATORY (INHALATION) at 11:59

## 2018-09-14 RX ADMIN — ALBUTEROL SULFATE SCH MG: 2.5 SOLUTION RESPIRATORY (INHALATION) at 00:30

## 2018-09-14 RX ADMIN — APIXABAN SCH MG: 2.5 TABLET, FILM COATED ORAL at 21:58

## 2018-09-14 RX ADMIN — CEFTRIAXONE SODIUM SCH MLS/HR: 1 INJECTION, POWDER, FOR SOLUTION INTRAMUSCULAR; INTRAVENOUS at 09:00

## 2018-09-14 RX ADMIN — ATORVASTATIN CALCIUM SCH MG: 20 TABLET, FILM COATED ORAL at 21:58

## 2018-09-14 RX ADMIN — POTASSIUM CHLORIDE SCH MEQ: 1500 TABLET, EXTENDED RELEASE ORAL at 09:56

## 2018-09-14 RX ADMIN — DRONEDARONE SCH MG: 400 TABLET, FILM COATED ORAL at 22:05

## 2018-09-14 RX ADMIN — RANOLAZINE SCH MG: 500 TABLET, FILM COATED, EXTENDED RELEASE ORAL at 22:05

## 2018-09-14 RX ADMIN — IPRATROPIUM BROMIDE SCH MG: 0.5 SOLUTION RESPIRATORY (INHALATION) at 06:37

## 2018-09-14 RX ADMIN — APIXABAN SCH MG: 2.5 TABLET, FILM COATED ORAL at 09:56

## 2018-09-14 RX ADMIN — BUDESONIDE SCH MG: 0.5 SUSPENSION RESPIRATORY (INHALATION) at 11:59

## 2018-09-14 RX ADMIN — BUDESONIDE SCH MG: 0.5 SUSPENSION RESPIRATORY (INHALATION) at 19:14

## 2018-09-14 RX ADMIN — AZITHROMYCIN DIHYDRATE SCH MLS/HR: 500 INJECTION, POWDER, LYOPHILIZED, FOR SOLUTION INTRAVENOUS at 09:59

## 2018-09-14 RX ADMIN — CARVEDILOL SCH MG: 3.12 TABLET, FILM COATED ORAL at 21:58

## 2018-09-14 RX ADMIN — METHYLPREDNISOLONE SODIUM SUCCINATE SCH MG: 40 INJECTION, POWDER, FOR SOLUTION INTRAMUSCULAR; INTRAVENOUS at 05:34

## 2018-09-14 RX ADMIN — CARVEDILOL SCH MG: 3.12 TABLET, FILM COATED ORAL at 09:55

## 2018-09-14 RX ADMIN — IPRATROPIUM BROMIDE SCH MG: 0.5 SOLUTION RESPIRATORY (INHALATION) at 00:30

## 2018-09-14 RX ADMIN — FUROSEMIDE SCH MG: 10 INJECTION, SOLUTION INTRAMUSCULAR; INTRAVENOUS at 09:52

## 2018-09-14 RX ADMIN — IPRATROPIUM BROMIDE SCH MG: 0.5 SOLUTION RESPIRATORY (INHALATION) at 11:59

## 2018-09-14 RX ADMIN — ALBUTEROL SULFATE SCH MG: 2.5 SOLUTION RESPIRATORY (INHALATION) at 19:14

## 2018-09-15 VITALS — DIASTOLIC BLOOD PRESSURE: 72 MMHG | SYSTOLIC BLOOD PRESSURE: 101 MMHG

## 2018-09-15 VITALS — SYSTOLIC BLOOD PRESSURE: 124 MMHG | DIASTOLIC BLOOD PRESSURE: 84 MMHG

## 2018-09-15 VITALS — SYSTOLIC BLOOD PRESSURE: 164 MMHG | DIASTOLIC BLOOD PRESSURE: 98 MMHG

## 2018-09-15 VITALS — DIASTOLIC BLOOD PRESSURE: 78 MMHG | SYSTOLIC BLOOD PRESSURE: 114 MMHG

## 2018-09-15 VITALS — DIASTOLIC BLOOD PRESSURE: 37 MMHG | SYSTOLIC BLOOD PRESSURE: 82 MMHG

## 2018-09-15 VITALS — DIASTOLIC BLOOD PRESSURE: 72 MMHG | SYSTOLIC BLOOD PRESSURE: 102 MMHG

## 2018-09-15 LAB
ANION GAP SERPL CALCULATED.3IONS-SCNC: 5 MMOL/L (ref 5–15)
BUN SERPL-MCNC: 30 MG/DL (ref 7–18)
BUN/CREAT SERPL: 39.5
CALCIUM SERPL-MCNC: 7.9 MG/DL (ref 8.5–10.1)
CHLORIDE SERPL-SCNC: 102 MMOL/L (ref 98–107)
CO2 SERPL-SCNC: 31 MMOL/L (ref 21–32)
GLUCOSE SERPL-MCNC: 113 MG/DL (ref 74–106)
HCT VFR BLD AUTO: 46.3 % (ref 36–46)
HGB BLD-MCNC: 14.5 G/DL (ref 12.2–16.2)
MAGNESIUM SERPL-MCNC: 3 MG/DL (ref 1.6–2.6)
MCH RBC QN AUTO: 26.4 PG (ref 28–32)
MCV RBC AUTO: 83.9 FL (ref 80–100)
NRBC BLD QL AUTO: 0.3 %
POTASSIUM SERPL-SCNC: 4.9 MMOL/L (ref 3.5–5.1)
SODIUM SERPL-SCNC: 138 MMOL/L (ref 136–145)

## 2018-09-15 RX ADMIN — ALBUTEROL SULFATE SCH MG: 2.5 SOLUTION RESPIRATORY (INHALATION) at 06:57

## 2018-09-15 RX ADMIN — IPRATROPIUM BROMIDE SCH MG: 0.5 SOLUTION RESPIRATORY (INHALATION) at 00:00

## 2018-09-15 RX ADMIN — HYDROCODONE BITARTRATE AND ACETAMINOPHEN PRN TAB: 5; 325 TABLET ORAL at 18:09

## 2018-09-15 RX ADMIN — IPRATROPIUM BROMIDE SCH MG: 0.5 SOLUTION RESPIRATORY (INHALATION) at 06:57

## 2018-09-15 RX ADMIN — FUROSEMIDE SCH MG: 10 INJECTION, SOLUTION INTRAMUSCULAR; INTRAVENOUS at 10:11

## 2018-09-15 RX ADMIN — IPRATROPIUM BROMIDE SCH MG: 0.5 SOLUTION RESPIRATORY (INHALATION) at 18:45

## 2018-09-15 RX ADMIN — CARVEDILOL SCH MG: 3.12 TABLET, FILM COATED ORAL at 10:14

## 2018-09-15 RX ADMIN — AZITHROMYCIN MONOHYDRATE SCH MG: 250 TABLET ORAL at 10:09

## 2018-09-15 RX ADMIN — DRONEDARONE SCH MG: 400 TABLET, FILM COATED ORAL at 10:41

## 2018-09-15 RX ADMIN — ALBUTEROL SULFATE SCH MG: 2.5 SOLUTION RESPIRATORY (INHALATION) at 18:45

## 2018-09-15 RX ADMIN — ALBUTEROL SULFATE SCH MG: 2.5 SOLUTION RESPIRATORY (INHALATION) at 00:00

## 2018-09-15 RX ADMIN — ALBUTEROL SULFATE SCH MG: 2.5 SOLUTION RESPIRATORY (INHALATION) at 12:18

## 2018-09-15 RX ADMIN — BUDESONIDE SCH MG: 0.5 SUSPENSION RESPIRATORY (INHALATION) at 06:57

## 2018-09-15 RX ADMIN — RANOLAZINE SCH MG: 500 TABLET, FILM COATED, EXTENDED RELEASE ORAL at 21:31

## 2018-09-15 RX ADMIN — ATORVASTATIN CALCIUM SCH MG: 20 TABLET, FILM COATED ORAL at 21:31

## 2018-09-15 RX ADMIN — HYDROCODONE BITARTRATE AND ACETAMINOPHEN PRN TAB: 5; 325 TABLET ORAL at 10:51

## 2018-09-15 RX ADMIN — APIXABAN SCH MG: 2.5 TABLET, FILM COATED ORAL at 10:09

## 2018-09-15 RX ADMIN — POTASSIUM CHLORIDE SCH MEQ: 1500 TABLET, EXTENDED RELEASE ORAL at 10:11

## 2018-09-15 RX ADMIN — APIXABAN SCH MG: 2.5 TABLET, FILM COATED ORAL at 21:31

## 2018-09-15 RX ADMIN — CARVEDILOL SCH MG: 3.12 TABLET, FILM COATED ORAL at 21:31

## 2018-09-15 RX ADMIN — LOSARTAN POTASSIUM SCH MG: 25 TABLET, FILM COATED ORAL at 10:11

## 2018-09-15 RX ADMIN — CEFTRIAXONE SODIUM SCH MLS/HR: 1 INJECTION, POWDER, FOR SOLUTION INTRAMUSCULAR; INTRAVENOUS at 09:03

## 2018-09-15 RX ADMIN — RANOLAZINE SCH MG: 500 TABLET, FILM COATED, EXTENDED RELEASE ORAL at 10:30

## 2018-09-15 RX ADMIN — IPRATROPIUM BROMIDE SCH MG: 0.5 SOLUTION RESPIRATORY (INHALATION) at 12:18

## 2018-09-15 RX ADMIN — BUDESONIDE SCH MG: 0.5 SUSPENSION RESPIRATORY (INHALATION) at 18:45

## 2018-09-15 RX ADMIN — DRONEDARONE SCH MG: 400 TABLET, FILM COATED ORAL at 21:31

## 2018-09-16 VITALS — DIASTOLIC BLOOD PRESSURE: 90 MMHG | SYSTOLIC BLOOD PRESSURE: 153 MMHG

## 2018-09-16 VITALS — SYSTOLIC BLOOD PRESSURE: 91 MMHG | DIASTOLIC BLOOD PRESSURE: 73 MMHG

## 2018-09-16 VITALS — DIASTOLIC BLOOD PRESSURE: 88 MMHG | SYSTOLIC BLOOD PRESSURE: 108 MMHG

## 2018-09-16 VITALS — SYSTOLIC BLOOD PRESSURE: 109 MMHG | DIASTOLIC BLOOD PRESSURE: 77 MMHG

## 2018-09-16 VITALS — SYSTOLIC BLOOD PRESSURE: 92 MMHG | DIASTOLIC BLOOD PRESSURE: 60 MMHG

## 2018-09-16 VITALS — DIASTOLIC BLOOD PRESSURE: 81 MMHG | SYSTOLIC BLOOD PRESSURE: 100 MMHG

## 2018-09-16 LAB
ALBUMIN SERPL-MCNC: 3.3 G/DL (ref 3.4–5)
ALP SERPL-CCNC: 84 U/L (ref 45–117)
ALT SERPL-CCNC: 255 U/L (ref 13–56)
ANION GAP SERPL CALCULATED.3IONS-SCNC: 8 MMOL/L (ref 5–15)
BILIRUB DIRECT SERPL-MCNC: 0.5 MG/DL (ref 0–0.2)
BILIRUB SERPL-MCNC: 1 MG/DL (ref 0.2–1)
BUN SERPL-MCNC: 40 MG/DL (ref 7–18)
BUN/CREAT SERPL: 32.8
CALCIUM SERPL-MCNC: 8.4 MG/DL (ref 8.5–10.1)
CHLORIDE SERPL-SCNC: 94 MMOL/L (ref 98–107)
CO2 SERPL-SCNC: 28 MMOL/L (ref 21–32)
GLUCOSE SERPL-MCNC: 117 MG/DL (ref 74–106)
HCT VFR BLD AUTO: 51 % (ref 36–46)
HCT VFR BLD AUTO: 54.1 % (ref 36–46)
HGB BLD-MCNC: 15.9 G/DL (ref 12.2–16.2)
HGB BLD-MCNC: 16.4 G/DL (ref 12.2–16.2)
MCH RBC QN AUTO: 26 PG (ref 28–32)
MCH RBC QN AUTO: 26.2 PG (ref 28–32)
MCV RBC AUTO: 83.9 FL (ref 80–100)
MCV RBC AUTO: 85.6 FL (ref 80–100)
NRBC BLD QL AUTO: 0.3 %
NRBC BLD QL AUTO: 0.5 %
POTASSIUM SERPL-SCNC: 5.6 MMOL/L (ref 3.5–5.1)
PROT SERPL-MCNC: 6.2 G/DL (ref 6.4–8.2)
SODIUM SERPL-SCNC: 130 MMOL/L (ref 136–145)

## 2018-09-16 RX ADMIN — LOSARTAN POTASSIUM SCH MG: 25 TABLET, FILM COATED ORAL at 09:53

## 2018-09-16 RX ADMIN — HYDROCODONE BITARTRATE AND ACETAMINOPHEN PRN TAB: 5; 325 TABLET ORAL at 15:41

## 2018-09-16 RX ADMIN — IPRATROPIUM BROMIDE SCH MG: 0.5 SOLUTION RESPIRATORY (INHALATION) at 00:17

## 2018-09-16 RX ADMIN — HYDROCODONE BITARTRATE AND ACETAMINOPHEN PRN TAB: 5; 325 TABLET ORAL at 09:09

## 2018-09-16 RX ADMIN — AZITHROMYCIN MONOHYDRATE SCH MG: 250 TABLET ORAL at 09:51

## 2018-09-16 RX ADMIN — ALBUTEROL SULFATE SCH MG: 2.5 SOLUTION RESPIRATORY (INHALATION) at 00:17

## 2018-09-16 RX ADMIN — BUDESONIDE SCH MG: 0.5 SUSPENSION RESPIRATORY (INHALATION) at 18:43

## 2018-09-16 RX ADMIN — HYDROCODONE BITARTRATE AND ACETAMINOPHEN PRN TAB: 5; 325 TABLET ORAL at 00:17

## 2018-09-16 RX ADMIN — ATORVASTATIN CALCIUM SCH MG: 20 TABLET, FILM COATED ORAL at 21:53

## 2018-09-16 RX ADMIN — DRONEDARONE SCH MG: 400 TABLET, FILM COATED ORAL at 09:52

## 2018-09-16 RX ADMIN — IPRATROPIUM BROMIDE SCH MG: 0.5 SOLUTION RESPIRATORY (INHALATION) at 06:29

## 2018-09-16 RX ADMIN — IPRATROPIUM BROMIDE SCH MG: 0.5 SOLUTION RESPIRATORY (INHALATION) at 18:43

## 2018-09-16 RX ADMIN — RANOLAZINE SCH MG: 500 TABLET, FILM COATED, EXTENDED RELEASE ORAL at 09:52

## 2018-09-16 RX ADMIN — DRONEDARONE SCH MG: 400 TABLET, FILM COATED ORAL at 21:54

## 2018-09-16 RX ADMIN — POTASSIUM CHLORIDE SCH MEQ: 1500 TABLET, EXTENDED RELEASE ORAL at 09:54

## 2018-09-16 RX ADMIN — CARVEDILOL SCH MG: 3.12 TABLET, FILM COATED ORAL at 09:54

## 2018-09-16 RX ADMIN — CARVEDILOL SCH MG: 3.12 TABLET, FILM COATED ORAL at 21:53

## 2018-09-16 RX ADMIN — CEFTRIAXONE SODIUM SCH MLS/HR: 1 INJECTION, POWDER, FOR SOLUTION INTRAMUSCULAR; INTRAVENOUS at 09:10

## 2018-09-16 RX ADMIN — ALBUTEROL SULFATE SCH MG: 2.5 SOLUTION RESPIRATORY (INHALATION) at 06:29

## 2018-09-16 RX ADMIN — APIXABAN SCH MG: 2.5 TABLET, FILM COATED ORAL at 21:53

## 2018-09-16 RX ADMIN — BUDESONIDE SCH MG: 0.5 SUSPENSION RESPIRATORY (INHALATION) at 06:29

## 2018-09-16 RX ADMIN — ALBUTEROL SULFATE SCH MG: 2.5 SOLUTION RESPIRATORY (INHALATION) at 11:42

## 2018-09-16 RX ADMIN — RANOLAZINE SCH MG: 500 TABLET, FILM COATED, EXTENDED RELEASE ORAL at 21:54

## 2018-09-16 RX ADMIN — ALBUTEROL SULFATE SCH MG: 2.5 SOLUTION RESPIRATORY (INHALATION) at 18:43

## 2018-09-16 RX ADMIN — APIXABAN SCH MG: 2.5 TABLET, FILM COATED ORAL at 09:52

## 2018-09-16 RX ADMIN — FUROSEMIDE SCH MG: 10 INJECTION, SOLUTION INTRAMUSCULAR; INTRAVENOUS at 09:54

## 2018-09-16 RX ADMIN — IPRATROPIUM BROMIDE SCH MG: 0.5 SOLUTION RESPIRATORY (INHALATION) at 11:41

## 2018-09-17 VITALS — SYSTOLIC BLOOD PRESSURE: 89 MMHG | DIASTOLIC BLOOD PRESSURE: 56 MMHG

## 2018-09-17 VITALS — DIASTOLIC BLOOD PRESSURE: 51 MMHG | SYSTOLIC BLOOD PRESSURE: 86 MMHG

## 2018-09-17 VITALS — DIASTOLIC BLOOD PRESSURE: 72 MMHG | SYSTOLIC BLOOD PRESSURE: 98 MMHG

## 2018-09-17 VITALS — DIASTOLIC BLOOD PRESSURE: 79 MMHG | SYSTOLIC BLOOD PRESSURE: 111 MMHG

## 2018-09-17 VITALS — DIASTOLIC BLOOD PRESSURE: 92 MMHG | SYSTOLIC BLOOD PRESSURE: 109 MMHG

## 2018-09-17 LAB
ALBUMIN SERPL-MCNC: 3 G/DL (ref 3.4–5)
ALBUMIN SERPL-MCNC: 3.4 G/DL (ref 3.4–5)
ALP SERPL-CCNC: 70 U/L (ref 45–117)
ALP SERPL-CCNC: 77 U/L (ref 45–117)
ALT SERPL-CCNC: 1497 U/L (ref 13–56)
ALT SERPL-CCNC: 1765 U/L (ref 13–56)
ANION GAP SERPL CALCULATED.3IONS-SCNC: 10 MMOL/L (ref 5–15)
ANION GAP SERPL CALCULATED.3IONS-SCNC: 9 MMOL/L (ref 5–15)
BILIRUB SERPL-MCNC: 0.9 MG/DL (ref 0.2–1)
BILIRUB SERPL-MCNC: 1.7 MG/DL (ref 0.2–1)
BUN SERPL-MCNC: 47 MG/DL (ref 7–18)
BUN SERPL-MCNC: 47 MG/DL (ref 7–18)
BUN/CREAT SERPL: 33.8
BUN/CREAT SERPL: 34.3
CALCIUM SERPL-MCNC: 8 MG/DL (ref 8.5–10.1)
CALCIUM SERPL-MCNC: 8 MG/DL (ref 8.5–10.1)
CHLORIDE SERPL-SCNC: 92 MMOL/L (ref 98–107)
CHLORIDE SERPL-SCNC: 94 MMOL/L (ref 98–107)
CO2 SERPL-SCNC: 28 MMOL/L (ref 21–32)
CO2 SERPL-SCNC: 29 MMOL/L (ref 21–32)
GLUCOSE SERPL-MCNC: 110 MG/DL (ref 74–106)
GLUCOSE SERPL-MCNC: 128 MG/DL (ref 74–106)
HCT VFR BLD AUTO: 51 % (ref 36–46)
HGB BLD-MCNC: 16.3 G/DL (ref 12.2–16.2)
MCH RBC QN AUTO: 26.6 PG (ref 28–32)
MCV RBC AUTO: 83.5 FL (ref 80–100)
NRBC BLD QL AUTO: 0.4 %
POTASSIUM SERPL-SCNC: 5.3 MMOL/L (ref 3.5–5.1)
POTASSIUM SERPL-SCNC: 6 MMOL/L (ref 3.5–5.1)
PROT SERPL-MCNC: 5.7 G/DL (ref 6.4–8.2)
PROT SERPL-MCNC: 6.3 G/DL (ref 6.4–8.2)
SODIUM SERPL-SCNC: 130 MMOL/L (ref 136–145)
SODIUM SERPL-SCNC: 132 MMOL/L (ref 136–145)

## 2018-09-17 RX ADMIN — IPRATROPIUM BROMIDE SCH MG: 0.5 SOLUTION RESPIRATORY (INHALATION) at 11:54

## 2018-09-17 RX ADMIN — AZITHROMYCIN MONOHYDRATE SCH MG: 250 TABLET ORAL at 09:25

## 2018-09-17 RX ADMIN — FUROSEMIDE SCH MG: 40 TABLET ORAL at 09:27

## 2018-09-17 RX ADMIN — ALBUTEROL SULFATE SCH MG: 2.5 SOLUTION RESPIRATORY (INHALATION) at 00:00

## 2018-09-17 RX ADMIN — ALBUTEROL SULFATE SCH MG: 2.5 SOLUTION RESPIRATORY (INHALATION) at 06:18

## 2018-09-17 RX ADMIN — CEFTRIAXONE SODIUM SCH MLS/HR: 1 INJECTION, POWDER, FOR SOLUTION INTRAMUSCULAR; INTRAVENOUS at 09:23

## 2018-09-17 RX ADMIN — APIXABAN SCH MG: 2.5 TABLET, FILM COATED ORAL at 09:24

## 2018-09-17 RX ADMIN — ALBUTEROL SULFATE SCH MG: 2.5 SOLUTION RESPIRATORY (INHALATION) at 11:54

## 2018-09-17 RX ADMIN — RANOLAZINE SCH MG: 500 TABLET, FILM COATED, EXTENDED RELEASE ORAL at 21:52

## 2018-09-17 RX ADMIN — BUDESONIDE SCH MG: 0.5 SUSPENSION RESPIRATORY (INHALATION) at 06:18

## 2018-09-17 RX ADMIN — FUROSEMIDE SCH MG: 40 TABLET ORAL at 18:39

## 2018-09-17 RX ADMIN — DRONEDARONE SCH MG: 400 TABLET, FILM COATED ORAL at 21:52

## 2018-09-17 RX ADMIN — DRONEDARONE SCH MG: 400 TABLET, FILM COATED ORAL at 09:23

## 2018-09-17 RX ADMIN — IPRATROPIUM BROMIDE SCH MG: 0.5 SOLUTION RESPIRATORY (INHALATION) at 19:06

## 2018-09-17 RX ADMIN — IPRATROPIUM BROMIDE SCH MG: 0.5 SOLUTION RESPIRATORY (INHALATION) at 00:00

## 2018-09-17 RX ADMIN — IPRATROPIUM BROMIDE SCH MG: 0.5 SOLUTION RESPIRATORY (INHALATION) at 06:18

## 2018-09-17 RX ADMIN — FUROSEMIDE SCH MG: 40 TABLET ORAL at 09:24

## 2018-09-17 RX ADMIN — CARVEDILOL SCH MG: 3.12 TABLET, FILM COATED ORAL at 21:51

## 2018-09-17 RX ADMIN — APIXABAN SCH MG: 2.5 TABLET, FILM COATED ORAL at 21:52

## 2018-09-17 RX ADMIN — RANOLAZINE SCH MG: 500 TABLET, FILM COATED, EXTENDED RELEASE ORAL at 09:23

## 2018-09-17 RX ADMIN — ALBUTEROL SULFATE SCH MG: 2.5 SOLUTION RESPIRATORY (INHALATION) at 19:05

## 2018-09-17 RX ADMIN — LOSARTAN POTASSIUM SCH MG: 25 TABLET, FILM COATED ORAL at 09:25

## 2018-09-17 RX ADMIN — BUDESONIDE SCH MG: 0.5 SUSPENSION RESPIRATORY (INHALATION) at 19:06

## 2018-09-17 RX ADMIN — CARVEDILOL SCH MG: 3.12 TABLET, FILM COATED ORAL at 09:25

## 2018-09-18 VITALS — DIASTOLIC BLOOD PRESSURE: 63 MMHG | SYSTOLIC BLOOD PRESSURE: 81 MMHG

## 2018-09-18 VITALS — DIASTOLIC BLOOD PRESSURE: 74 MMHG | SYSTOLIC BLOOD PRESSURE: 126 MMHG

## 2018-09-18 VITALS — DIASTOLIC BLOOD PRESSURE: 57 MMHG | SYSTOLIC BLOOD PRESSURE: 108 MMHG

## 2018-09-18 VITALS — SYSTOLIC BLOOD PRESSURE: 110 MMHG | DIASTOLIC BLOOD PRESSURE: 83 MMHG

## 2018-09-18 LAB
ALBUMIN SERPL-MCNC: 3.4 G/DL (ref 3.4–5)
ALCOHOL, URINE: < 3 MG/DL (ref 0–5)
ALP SERPL-CCNC: 75 U/L (ref 45–117)
ALT SERPL-CCNC: 1347 U/L (ref 13–56)
AMPHETAMINES UR QL SCN: NEGATIVE
ANION GAP SERPL CALCULATED.3IONS-SCNC: 8 MMOL/L (ref 5–15)
BARBITURATES UR QL SCN: NEGATIVE
BENZODIAZ UR QL SCN: POSITIVE
BILIRUB SERPL-MCNC: 1.1 MG/DL (ref 0.2–1)
BUN SERPL-MCNC: 53 MG/DL (ref 7–18)
BUN/CREAT SERPL: 31.5
BZE UR QL SCN: NEGATIVE
CALCIUM SERPL-MCNC: 8.1 MG/DL (ref 8.5–10.1)
CANNABINOIDS UR QL SCN: NEGATIVE
CHLORIDE SERPL-SCNC: 92 MMOL/L (ref 98–107)
CO2 SERPL-SCNC: 30 MMOL/L (ref 21–32)
GLUCOSE SERPL-MCNC: 92 MG/DL (ref 74–106)
HCT VFR BLD AUTO: 48.9 % (ref 36–46)
HGB BLD-MCNC: 15.8 G/DL (ref 12.2–16.2)
INR PPP: 1.57 (ref 0.9–1.15)
MCH RBC QN AUTO: 26.9 PG (ref 28–32)
MCV RBC AUTO: 83 FL (ref 80–100)
NRBC BLD QL AUTO: 0.3 %
OPIATES UR QL SCN: NEGATIVE
PCP UR QL SCN: NEGATIVE
POTASSIUM SERPL-SCNC: 5.1 MMOL/L (ref 3.5–5.1)
PROT SERPL-MCNC: 6.2 G/DL (ref 6.4–8.2)
PROTHROMBIN TIME: 16.4 SEC (ref 9.27–12.13)
SODIUM SERPL-SCNC: 130 MMOL/L (ref 136–145)

## 2018-09-18 RX ADMIN — IPRATROPIUM BROMIDE SCH MG: 0.5 SOLUTION RESPIRATORY (INHALATION) at 12:22

## 2018-09-18 RX ADMIN — CARVEDILOL SCH MG: 3.12 TABLET, FILM COATED ORAL at 21:52

## 2018-09-18 RX ADMIN — CEFTRIAXONE SODIUM SCH MLS/HR: 1 INJECTION, POWDER, FOR SOLUTION INTRAMUSCULAR; INTRAVENOUS at 09:52

## 2018-09-18 RX ADMIN — BUDESONIDE SCH MG: 0.5 SUSPENSION RESPIRATORY (INHALATION) at 10:00

## 2018-09-18 RX ADMIN — IPRATROPIUM BROMIDE SCH MG: 0.5 SOLUTION RESPIRATORY (INHALATION) at 19:06

## 2018-09-18 RX ADMIN — APIXABAN SCH MG: 2.5 TABLET, FILM COATED ORAL at 21:52

## 2018-09-18 RX ADMIN — AZITHROMYCIN MONOHYDRATE SCH MG: 250 TABLET ORAL at 09:56

## 2018-09-18 RX ADMIN — ALBUTEROL SULFATE SCH MG: 2.5 SOLUTION RESPIRATORY (INHALATION) at 00:00

## 2018-09-18 RX ADMIN — DRONEDARONE SCH MG: 400 TABLET, FILM COATED ORAL at 21:52

## 2018-09-18 RX ADMIN — CARVEDILOL SCH MG: 3.12 TABLET, FILM COATED ORAL at 10:00

## 2018-09-18 RX ADMIN — APIXABAN SCH MG: 2.5 TABLET, FILM COATED ORAL at 09:54

## 2018-09-18 RX ADMIN — FUROSEMIDE SCH MG: 40 TABLET ORAL at 10:00

## 2018-09-18 RX ADMIN — ALBUTEROL SULFATE SCH MG: 2.5 SOLUTION RESPIRATORY (INHALATION) at 19:06

## 2018-09-18 RX ADMIN — ALBUTEROL SULFATE SCH MG: 2.5 SOLUTION RESPIRATORY (INHALATION) at 06:00

## 2018-09-18 RX ADMIN — RANOLAZINE SCH MG: 500 TABLET, FILM COATED, EXTENDED RELEASE ORAL at 10:00

## 2018-09-18 RX ADMIN — IPRATROPIUM BROMIDE SCH MG: 0.5 SOLUTION RESPIRATORY (INHALATION) at 06:00

## 2018-09-18 RX ADMIN — RANOLAZINE SCH MG: 500 TABLET, FILM COATED, EXTENDED RELEASE ORAL at 21:52

## 2018-09-18 RX ADMIN — BUDESONIDE SCH MG: 0.5 SUSPENSION RESPIRATORY (INHALATION) at 22:00

## 2018-09-18 RX ADMIN — IPRATROPIUM BROMIDE SCH MG: 0.5 SOLUTION RESPIRATORY (INHALATION) at 00:00

## 2018-09-18 RX ADMIN — ALBUTEROL SULFATE SCH MG: 2.5 SOLUTION RESPIRATORY (INHALATION) at 12:22

## 2018-09-18 RX ADMIN — DRONEDARONE SCH MG: 400 TABLET, FILM COATED ORAL at 10:01

## 2018-09-19 VITALS — DIASTOLIC BLOOD PRESSURE: 73 MMHG | SYSTOLIC BLOOD PRESSURE: 112 MMHG

## 2018-09-19 VITALS — SYSTOLIC BLOOD PRESSURE: 110 MMHG | DIASTOLIC BLOOD PRESSURE: 69 MMHG

## 2018-09-19 VITALS — SYSTOLIC BLOOD PRESSURE: 114 MMHG | DIASTOLIC BLOOD PRESSURE: 60 MMHG

## 2018-09-19 VITALS — SYSTOLIC BLOOD PRESSURE: 107 MMHG | DIASTOLIC BLOOD PRESSURE: 65 MMHG

## 2018-09-19 VITALS — DIASTOLIC BLOOD PRESSURE: 55 MMHG | SYSTOLIC BLOOD PRESSURE: 104 MMHG

## 2018-09-19 VITALS — SYSTOLIC BLOOD PRESSURE: 93 MMHG | DIASTOLIC BLOOD PRESSURE: 66 MMHG

## 2018-09-19 VITALS — SYSTOLIC BLOOD PRESSURE: 108 MMHG | DIASTOLIC BLOOD PRESSURE: 72 MMHG

## 2018-09-19 VITALS — SYSTOLIC BLOOD PRESSURE: 112 MMHG | DIASTOLIC BLOOD PRESSURE: 73 MMHG

## 2018-09-19 VITALS — SYSTOLIC BLOOD PRESSURE: 102 MMHG | DIASTOLIC BLOOD PRESSURE: 78 MMHG

## 2018-09-19 VITALS — SYSTOLIC BLOOD PRESSURE: 139 MMHG | DIASTOLIC BLOOD PRESSURE: 74 MMHG

## 2018-09-19 VITALS — DIASTOLIC BLOOD PRESSURE: 73 MMHG | SYSTOLIC BLOOD PRESSURE: 101 MMHG

## 2018-09-19 VITALS — SYSTOLIC BLOOD PRESSURE: 134 MMHG | DIASTOLIC BLOOD PRESSURE: 66 MMHG

## 2018-09-19 VITALS — DIASTOLIC BLOOD PRESSURE: 52 MMHG | SYSTOLIC BLOOD PRESSURE: 90 MMHG

## 2018-09-19 VITALS — DIASTOLIC BLOOD PRESSURE: 50 MMHG | SYSTOLIC BLOOD PRESSURE: 84 MMHG

## 2018-09-19 VITALS — DIASTOLIC BLOOD PRESSURE: 83 MMHG | SYSTOLIC BLOOD PRESSURE: 109 MMHG

## 2018-09-19 VITALS — DIASTOLIC BLOOD PRESSURE: 63 MMHG | SYSTOLIC BLOOD PRESSURE: 110 MMHG

## 2018-09-19 VITALS — SYSTOLIC BLOOD PRESSURE: 122 MMHG | DIASTOLIC BLOOD PRESSURE: 83 MMHG

## 2018-09-19 VITALS — SYSTOLIC BLOOD PRESSURE: 99 MMHG | DIASTOLIC BLOOD PRESSURE: 36 MMHG

## 2018-09-19 VITALS — SYSTOLIC BLOOD PRESSURE: 84 MMHG | DIASTOLIC BLOOD PRESSURE: 61 MMHG

## 2018-09-19 VITALS — SYSTOLIC BLOOD PRESSURE: 99 MMHG | DIASTOLIC BLOOD PRESSURE: 38 MMHG

## 2018-09-19 VITALS — DIASTOLIC BLOOD PRESSURE: 66 MMHG | SYSTOLIC BLOOD PRESSURE: 134 MMHG

## 2018-09-19 VITALS — DIASTOLIC BLOOD PRESSURE: 88 MMHG | SYSTOLIC BLOOD PRESSURE: 126 MMHG

## 2018-09-19 VITALS — SYSTOLIC BLOOD PRESSURE: 126 MMHG | DIASTOLIC BLOOD PRESSURE: 88 MMHG

## 2018-09-19 VITALS — DIASTOLIC BLOOD PRESSURE: 54 MMHG | SYSTOLIC BLOOD PRESSURE: 98 MMHG

## 2018-09-19 LAB
ALBUMIN SERPL-MCNC: 3.3 G/DL (ref 3.4–5)
ALP SERPL-CCNC: 118 U/L (ref 45–117)
ALT SERPL-CCNC: 973 U/L (ref 13–56)
ANION GAP SERPL CALCULATED.3IONS-SCNC: 8 MMOL/L (ref 5–15)
BILIRUB SERPL-MCNC: 0.8 MG/DL (ref 0.2–1)
BUN SERPL-MCNC: 51 MG/DL (ref 7–18)
BUN/CREAT SERPL: 38.3
CALCIUM SERPL-MCNC: 8 MG/DL (ref 8.5–10.1)
CHLORIDE SERPL-SCNC: 93 MMOL/L (ref 98–107)
CO2 SERPL-SCNC: 28 MMOL/L (ref 21–32)
GLUCOSE SERPL-MCNC: 168 MG/DL (ref 74–106)
HCT VFR BLD AUTO: 47.4 % (ref 36–46)
HGB BLD-MCNC: 15.4 G/DL (ref 12.2–16.2)
MCH RBC QN AUTO: 26.9 PG (ref 28–32)
MCV RBC AUTO: 82.9 FL (ref 80–100)
NRBC BLD QL AUTO: 0.1 %
POTASSIUM SERPL-SCNC: 4.6 MMOL/L (ref 3.5–5.1)
PROT SERPL-MCNC: 6.2 G/DL (ref 6.4–8.2)
SODIUM SERPL-SCNC: 129 MMOL/L (ref 136–145)

## 2018-09-19 PROCEDURE — 0BH17EZ INSERTION OF ENDOTRACHEAL AIRWAY INTO TRACHEA, VIA NATURAL OR ARTIFICIAL OPENING: ICD-10-PCS | Performed by: INTERNAL MEDICINE

## 2018-09-19 PROCEDURE — 5A12012 PERFORMANCE OF CARDIAC OUTPUT, SINGLE, MANUAL: ICD-10-PCS | Performed by: NURSE PRACTITIONER

## 2018-09-19 PROCEDURE — 5A1955Z RESPIRATORY VENTILATION, GREATER THAN 96 CONSECUTIVE HOURS: ICD-10-PCS | Performed by: INTERNAL MEDICINE

## 2018-09-19 RX ADMIN — ONDANSETRON HYDROCHLORIDE PRN MG: 2 INJECTION, SOLUTION INTRAMUSCULAR; INTRAVENOUS at 13:37

## 2018-09-19 RX ADMIN — ALBUTEROL SULFATE SCH MG: 2.5 SOLUTION RESPIRATORY (INHALATION) at 18:00

## 2018-09-19 RX ADMIN — FUROSEMIDE SCH MG: 40 TABLET ORAL at 09:32

## 2018-09-19 RX ADMIN — RANOLAZINE SCH MG: 500 TABLET, FILM COATED, EXTENDED RELEASE ORAL at 09:36

## 2018-09-19 RX ADMIN — APIXABAN SCH MG: 2.5 TABLET, FILM COATED ORAL at 22:14

## 2018-09-19 RX ADMIN — ONDANSETRON HYDROCHLORIDE PRN MG: 2 INJECTION, SOLUTION INTRAMUSCULAR; INTRAVENOUS at 04:40

## 2018-09-19 RX ADMIN — DOPAMINE HYDROCHLORIDE IN DEXTROSE SCH MLS/HR: 1.6 INJECTION, SOLUTION INTRAVENOUS at 22:13

## 2018-09-19 RX ADMIN — BUDESONIDE SCH MG: 0.5 SUSPENSION RESPIRATORY (INHALATION) at 06:09

## 2018-09-19 RX ADMIN — IPRATROPIUM BROMIDE SCH MG: 0.5 SOLUTION RESPIRATORY (INHALATION) at 06:09

## 2018-09-19 RX ADMIN — DRONEDARONE SCH MG: 400 TABLET, FILM COATED ORAL at 22:14

## 2018-09-19 RX ADMIN — ALBUTEROL SULFATE SCH MG: 2.5 SOLUTION RESPIRATORY (INHALATION) at 23:58

## 2018-09-19 RX ADMIN — RANOLAZINE SCH MG: 500 TABLET, FILM COATED, EXTENDED RELEASE ORAL at 21:58

## 2018-09-19 RX ADMIN — Medication PRN ML: at 13:45

## 2018-09-19 RX ADMIN — AZITHROMYCIN MONOHYDRATE SCH MG: 250 TABLET ORAL at 09:33

## 2018-09-19 RX ADMIN — CEFTRIAXONE SODIUM SCH MLS/HR: 1 INJECTION, POWDER, FOR SOLUTION INTRAMUSCULAR; INTRAVENOUS at 09:28

## 2018-09-19 RX ADMIN — BUDESONIDE SCH MG: 0.5 SUSPENSION RESPIRATORY (INHALATION) at 23:58

## 2018-09-19 RX ADMIN — CARVEDILOL SCH MG: 3.12 TABLET, FILM COATED ORAL at 09:34

## 2018-09-19 RX ADMIN — ALBUTEROL SULFATE SCH MG: 2.5 SOLUTION RESPIRATORY (INHALATION) at 12:08

## 2018-09-19 RX ADMIN — IPRATROPIUM BROMIDE SCH MG: 0.5 SOLUTION RESPIRATORY (INHALATION) at 23:58

## 2018-09-19 RX ADMIN — IPRATROPIUM BROMIDE SCH MG: 0.5 SOLUTION RESPIRATORY (INHALATION) at 12:08

## 2018-09-19 RX ADMIN — CARVEDILOL SCH MG: 3.12 TABLET, FILM COATED ORAL at 21:59

## 2018-09-19 RX ADMIN — IPRATROPIUM BROMIDE SCH MG: 0.5 SOLUTION RESPIRATORY (INHALATION) at 18:00

## 2018-09-19 RX ADMIN — ALBUTEROL SULFATE SCH MG: 2.5 SOLUTION RESPIRATORY (INHALATION) at 06:09

## 2018-09-19 RX ADMIN — DRONEDARONE SCH MG: 400 TABLET, FILM COATED ORAL at 09:36

## 2018-09-19 RX ADMIN — APIXABAN SCH MG: 2.5 TABLET, FILM COATED ORAL at 09:35

## 2018-09-19 RX ADMIN — IPRATROPIUM BROMIDE SCH MG: 0.5 SOLUTION RESPIRATORY (INHALATION) at 00:00

## 2018-09-19 RX ADMIN — ALBUTEROL SULFATE SCH MG: 2.5 SOLUTION RESPIRATORY (INHALATION) at 00:00

## 2018-09-20 VITALS — SYSTOLIC BLOOD PRESSURE: 100 MMHG | DIASTOLIC BLOOD PRESSURE: 68 MMHG

## 2018-09-20 VITALS — DIASTOLIC BLOOD PRESSURE: 60 MMHG | SYSTOLIC BLOOD PRESSURE: 95 MMHG

## 2018-09-20 VITALS — SYSTOLIC BLOOD PRESSURE: 95 MMHG | DIASTOLIC BLOOD PRESSURE: 57 MMHG

## 2018-09-20 VITALS — SYSTOLIC BLOOD PRESSURE: 105 MMHG | DIASTOLIC BLOOD PRESSURE: 72 MMHG

## 2018-09-20 VITALS — DIASTOLIC BLOOD PRESSURE: 50 MMHG | SYSTOLIC BLOOD PRESSURE: 79 MMHG

## 2018-09-20 VITALS — SYSTOLIC BLOOD PRESSURE: 83 MMHG | DIASTOLIC BLOOD PRESSURE: 48 MMHG

## 2018-09-20 VITALS — SYSTOLIC BLOOD PRESSURE: 107 MMHG | DIASTOLIC BLOOD PRESSURE: 80 MMHG

## 2018-09-20 VITALS — DIASTOLIC BLOOD PRESSURE: 58 MMHG | SYSTOLIC BLOOD PRESSURE: 94 MMHG

## 2018-09-20 VITALS — SYSTOLIC BLOOD PRESSURE: 91 MMHG | DIASTOLIC BLOOD PRESSURE: 59 MMHG

## 2018-09-20 VITALS — SYSTOLIC BLOOD PRESSURE: 104 MMHG | DIASTOLIC BLOOD PRESSURE: 57 MMHG

## 2018-09-20 VITALS — SYSTOLIC BLOOD PRESSURE: 102 MMHG | DIASTOLIC BLOOD PRESSURE: 62 MMHG

## 2018-09-20 VITALS — DIASTOLIC BLOOD PRESSURE: 63 MMHG | SYSTOLIC BLOOD PRESSURE: 95 MMHG

## 2018-09-20 VITALS — DIASTOLIC BLOOD PRESSURE: 83 MMHG | SYSTOLIC BLOOD PRESSURE: 124 MMHG

## 2018-09-20 VITALS — DIASTOLIC BLOOD PRESSURE: 55 MMHG | SYSTOLIC BLOOD PRESSURE: 87 MMHG

## 2018-09-20 VITALS — DIASTOLIC BLOOD PRESSURE: 61 MMHG | SYSTOLIC BLOOD PRESSURE: 97 MMHG

## 2018-09-20 VITALS — SYSTOLIC BLOOD PRESSURE: 96 MMHG | DIASTOLIC BLOOD PRESSURE: 55 MMHG

## 2018-09-20 VITALS — SYSTOLIC BLOOD PRESSURE: 110 MMHG | DIASTOLIC BLOOD PRESSURE: 60 MMHG

## 2018-09-20 VITALS — SYSTOLIC BLOOD PRESSURE: 89 MMHG | DIASTOLIC BLOOD PRESSURE: 66 MMHG

## 2018-09-20 VITALS — DIASTOLIC BLOOD PRESSURE: 64 MMHG | SYSTOLIC BLOOD PRESSURE: 99 MMHG

## 2018-09-20 VITALS — DIASTOLIC BLOOD PRESSURE: 63 MMHG | SYSTOLIC BLOOD PRESSURE: 106 MMHG

## 2018-09-20 VITALS — SYSTOLIC BLOOD PRESSURE: 106 MMHG | DIASTOLIC BLOOD PRESSURE: 60 MMHG

## 2018-09-20 VITALS — DIASTOLIC BLOOD PRESSURE: 89 MMHG | SYSTOLIC BLOOD PRESSURE: 124 MMHG

## 2018-09-20 VITALS — DIASTOLIC BLOOD PRESSURE: 74 MMHG | SYSTOLIC BLOOD PRESSURE: 114 MMHG

## 2018-09-20 VITALS — DIASTOLIC BLOOD PRESSURE: 95 MMHG | SYSTOLIC BLOOD PRESSURE: 120 MMHG

## 2018-09-20 VITALS — SYSTOLIC BLOOD PRESSURE: 98 MMHG | DIASTOLIC BLOOD PRESSURE: 58 MMHG

## 2018-09-20 VITALS — DIASTOLIC BLOOD PRESSURE: 36 MMHG | SYSTOLIC BLOOD PRESSURE: 102 MMHG

## 2018-09-20 VITALS — DIASTOLIC BLOOD PRESSURE: 55 MMHG | SYSTOLIC BLOOD PRESSURE: 91 MMHG

## 2018-09-20 VITALS — SYSTOLIC BLOOD PRESSURE: 112 MMHG | DIASTOLIC BLOOD PRESSURE: 62 MMHG

## 2018-09-20 VITALS — SYSTOLIC BLOOD PRESSURE: 103 MMHG | DIASTOLIC BLOOD PRESSURE: 65 MMHG

## 2018-09-20 VITALS — SYSTOLIC BLOOD PRESSURE: 107 MMHG | DIASTOLIC BLOOD PRESSURE: 73 MMHG

## 2018-09-20 VITALS — DIASTOLIC BLOOD PRESSURE: 57 MMHG | SYSTOLIC BLOOD PRESSURE: 93 MMHG

## 2018-09-20 VITALS — DIASTOLIC BLOOD PRESSURE: 65 MMHG | SYSTOLIC BLOOD PRESSURE: 103 MMHG

## 2018-09-20 VITALS — SYSTOLIC BLOOD PRESSURE: 103 MMHG | DIASTOLIC BLOOD PRESSURE: 63 MMHG

## 2018-09-20 VITALS — SYSTOLIC BLOOD PRESSURE: 89 MMHG | DIASTOLIC BLOOD PRESSURE: 59 MMHG

## 2018-09-20 VITALS — SYSTOLIC BLOOD PRESSURE: 93 MMHG | DIASTOLIC BLOOD PRESSURE: 62 MMHG

## 2018-09-20 VITALS — DIASTOLIC BLOOD PRESSURE: 66 MMHG | SYSTOLIC BLOOD PRESSURE: 109 MMHG

## 2018-09-20 VITALS — SYSTOLIC BLOOD PRESSURE: 102 MMHG | DIASTOLIC BLOOD PRESSURE: 63 MMHG

## 2018-09-20 VITALS — SYSTOLIC BLOOD PRESSURE: 94 MMHG | DIASTOLIC BLOOD PRESSURE: 58 MMHG

## 2018-09-20 VITALS — SYSTOLIC BLOOD PRESSURE: 77 MMHG | DIASTOLIC BLOOD PRESSURE: 48 MMHG

## 2018-09-20 VITALS — DIASTOLIC BLOOD PRESSURE: 68 MMHG | SYSTOLIC BLOOD PRESSURE: 108 MMHG

## 2018-09-20 VITALS — DIASTOLIC BLOOD PRESSURE: 59 MMHG | SYSTOLIC BLOOD PRESSURE: 89 MMHG

## 2018-09-20 VITALS — DIASTOLIC BLOOD PRESSURE: 60 MMHG | SYSTOLIC BLOOD PRESSURE: 106 MMHG

## 2018-09-20 VITALS — DIASTOLIC BLOOD PRESSURE: 59 MMHG | SYSTOLIC BLOOD PRESSURE: 94 MMHG

## 2018-09-20 VITALS — SYSTOLIC BLOOD PRESSURE: 89 MMHG | DIASTOLIC BLOOD PRESSURE: 49 MMHG

## 2018-09-20 VITALS — SYSTOLIC BLOOD PRESSURE: 92 MMHG | DIASTOLIC BLOOD PRESSURE: 59 MMHG

## 2018-09-20 VITALS — DIASTOLIC BLOOD PRESSURE: 63 MMHG | SYSTOLIC BLOOD PRESSURE: 97 MMHG

## 2018-09-20 VITALS — SYSTOLIC BLOOD PRESSURE: 94 MMHG | DIASTOLIC BLOOD PRESSURE: 56 MMHG

## 2018-09-20 VITALS — SYSTOLIC BLOOD PRESSURE: 118 MMHG | DIASTOLIC BLOOD PRESSURE: 78 MMHG

## 2018-09-20 VITALS — DIASTOLIC BLOOD PRESSURE: 54 MMHG | SYSTOLIC BLOOD PRESSURE: 92 MMHG

## 2018-09-20 VITALS — SYSTOLIC BLOOD PRESSURE: 103 MMHG | DIASTOLIC BLOOD PRESSURE: 81 MMHG

## 2018-09-20 VITALS — DIASTOLIC BLOOD PRESSURE: 49 MMHG | SYSTOLIC BLOOD PRESSURE: 112 MMHG

## 2018-09-20 VITALS — SYSTOLIC BLOOD PRESSURE: 97 MMHG | DIASTOLIC BLOOD PRESSURE: 61 MMHG

## 2018-09-20 VITALS — SYSTOLIC BLOOD PRESSURE: 97 MMHG | DIASTOLIC BLOOD PRESSURE: 57 MMHG

## 2018-09-20 VITALS — DIASTOLIC BLOOD PRESSURE: 80 MMHG | SYSTOLIC BLOOD PRESSURE: 107 MMHG

## 2018-09-20 VITALS — DIASTOLIC BLOOD PRESSURE: 63 MMHG | SYSTOLIC BLOOD PRESSURE: 116 MMHG

## 2018-09-20 VITALS — DIASTOLIC BLOOD PRESSURE: 58 MMHG | SYSTOLIC BLOOD PRESSURE: 87 MMHG

## 2018-09-20 VITALS — DIASTOLIC BLOOD PRESSURE: 51 MMHG | SYSTOLIC BLOOD PRESSURE: 99 MMHG

## 2018-09-20 VITALS — DIASTOLIC BLOOD PRESSURE: 60 MMHG | SYSTOLIC BLOOD PRESSURE: 90 MMHG

## 2018-09-20 VITALS — SYSTOLIC BLOOD PRESSURE: 95 MMHG | DIASTOLIC BLOOD PRESSURE: 63 MMHG

## 2018-09-20 VITALS — DIASTOLIC BLOOD PRESSURE: 42 MMHG | SYSTOLIC BLOOD PRESSURE: 86 MMHG

## 2018-09-20 VITALS — DIASTOLIC BLOOD PRESSURE: 80 MMHG | SYSTOLIC BLOOD PRESSURE: 101 MMHG

## 2018-09-20 VITALS — DIASTOLIC BLOOD PRESSURE: 62 MMHG | SYSTOLIC BLOOD PRESSURE: 110 MMHG

## 2018-09-20 VITALS — DIASTOLIC BLOOD PRESSURE: 55 MMHG | SYSTOLIC BLOOD PRESSURE: 104 MMHG

## 2018-09-20 VITALS — SYSTOLIC BLOOD PRESSURE: 98 MMHG | DIASTOLIC BLOOD PRESSURE: 68 MMHG

## 2018-09-20 VITALS — DIASTOLIC BLOOD PRESSURE: 68 MMHG | SYSTOLIC BLOOD PRESSURE: 100 MMHG

## 2018-09-20 VITALS — SYSTOLIC BLOOD PRESSURE: 96 MMHG | DIASTOLIC BLOOD PRESSURE: 59 MMHG

## 2018-09-20 VITALS — DIASTOLIC BLOOD PRESSURE: 73 MMHG | SYSTOLIC BLOOD PRESSURE: 110 MMHG

## 2018-09-20 VITALS — SYSTOLIC BLOOD PRESSURE: 115 MMHG | DIASTOLIC BLOOD PRESSURE: 72 MMHG

## 2018-09-20 VITALS — SYSTOLIC BLOOD PRESSURE: 96 MMHG | DIASTOLIC BLOOD PRESSURE: 60 MMHG

## 2018-09-20 VITALS — SYSTOLIC BLOOD PRESSURE: 79 MMHG | DIASTOLIC BLOOD PRESSURE: 50 MMHG

## 2018-09-20 VITALS — SYSTOLIC BLOOD PRESSURE: 92 MMHG | DIASTOLIC BLOOD PRESSURE: 52 MMHG

## 2018-09-20 VITALS — SYSTOLIC BLOOD PRESSURE: 85 MMHG | DIASTOLIC BLOOD PRESSURE: 43 MMHG

## 2018-09-20 VITALS — SYSTOLIC BLOOD PRESSURE: 105 MMHG | DIASTOLIC BLOOD PRESSURE: 61 MMHG

## 2018-09-20 VITALS — DIASTOLIC BLOOD PRESSURE: 75 MMHG | SYSTOLIC BLOOD PRESSURE: 114 MMHG

## 2018-09-20 VITALS — DIASTOLIC BLOOD PRESSURE: 68 MMHG | SYSTOLIC BLOOD PRESSURE: 98 MMHG

## 2018-09-20 VITALS — SYSTOLIC BLOOD PRESSURE: 105 MMHG | DIASTOLIC BLOOD PRESSURE: 58 MMHG

## 2018-09-20 VITALS — DIASTOLIC BLOOD PRESSURE: 58 MMHG | SYSTOLIC BLOOD PRESSURE: 98 MMHG

## 2018-09-20 VITALS — SYSTOLIC BLOOD PRESSURE: 105 MMHG | DIASTOLIC BLOOD PRESSURE: 63 MMHG

## 2018-09-20 VITALS — SYSTOLIC BLOOD PRESSURE: 110 MMHG | DIASTOLIC BLOOD PRESSURE: 58 MMHG

## 2018-09-20 VITALS — SYSTOLIC BLOOD PRESSURE: 92 MMHG | DIASTOLIC BLOOD PRESSURE: 55 MMHG

## 2018-09-20 VITALS — DIASTOLIC BLOOD PRESSURE: 60 MMHG | SYSTOLIC BLOOD PRESSURE: 110 MMHG

## 2018-09-20 VITALS — SYSTOLIC BLOOD PRESSURE: 112 MMHG | DIASTOLIC BLOOD PRESSURE: 66 MMHG

## 2018-09-20 VITALS — SYSTOLIC BLOOD PRESSURE: 115 MMHG | DIASTOLIC BLOOD PRESSURE: 59 MMHG

## 2018-09-20 VITALS — DIASTOLIC BLOOD PRESSURE: 66 MMHG | SYSTOLIC BLOOD PRESSURE: 120 MMHG

## 2018-09-20 VITALS — SYSTOLIC BLOOD PRESSURE: 98 MMHG | DIASTOLIC BLOOD PRESSURE: 59 MMHG

## 2018-09-20 VITALS — DIASTOLIC BLOOD PRESSURE: 56 MMHG | SYSTOLIC BLOOD PRESSURE: 96 MMHG

## 2018-09-20 VITALS — SYSTOLIC BLOOD PRESSURE: 109 MMHG | DIASTOLIC BLOOD PRESSURE: 58 MMHG

## 2018-09-20 VITALS — SYSTOLIC BLOOD PRESSURE: 105 MMHG | DIASTOLIC BLOOD PRESSURE: 60 MMHG

## 2018-09-20 VITALS — DIASTOLIC BLOOD PRESSURE: 81 MMHG | SYSTOLIC BLOOD PRESSURE: 110 MMHG

## 2018-09-20 VITALS — DIASTOLIC BLOOD PRESSURE: 61 MMHG | SYSTOLIC BLOOD PRESSURE: 106 MMHG

## 2018-09-20 VITALS — DIASTOLIC BLOOD PRESSURE: 59 MMHG | SYSTOLIC BLOOD PRESSURE: 102 MMHG

## 2018-09-20 VITALS — DIASTOLIC BLOOD PRESSURE: 65 MMHG | SYSTOLIC BLOOD PRESSURE: 109 MMHG

## 2018-09-20 LAB
ALBUMIN SERPL-MCNC: 3.1 G/DL (ref 3.4–5)
ALCOHOL, URINE: < 3 MG/DL (ref 0–5)
ALP SERPL-CCNC: 106 U/L (ref 45–117)
ALT SERPL-CCNC: 799 U/L (ref 13–56)
AMPHETAMINES UR QL SCN: NEGATIVE
ANION GAP SERPL CALCULATED.3IONS-SCNC: 10 MMOL/L (ref 5–15)
ANION GAP SERPL CALCULATED.3IONS-SCNC: 12 MMOL/L (ref 5–15)
BARBITURATES UR QL SCN: NEGATIVE
BENZODIAZ UR QL SCN: POSITIVE
BILIRUB SERPL-MCNC: 2.4 MG/DL (ref 0.2–1)
BUN SERPL-MCNC: 46 MG/DL (ref 7–18)
BUN SERPL-MCNC: 59 MG/DL (ref 7–18)
BUN/CREAT SERPL: 30.3
BUN/CREAT SERPL: 31.5
BZE UR QL SCN: NEGATIVE
CALCIUM SERPL-MCNC: 7.7 MG/DL (ref 8.5–10.1)
CALCIUM SERPL-MCNC: 8.6 MG/DL (ref 8.5–10.1)
CANNABINOIDS UR QL SCN: NEGATIVE
CHLORIDE SERPL-SCNC: 91 MMOL/L (ref 98–107)
CHLORIDE SERPL-SCNC: 92 MMOL/L (ref 98–107)
CO2 SERPL-SCNC: 27 MMOL/L (ref 21–32)
CO2 SERPL-SCNC: 30 MMOL/L (ref 21–32)
GLUCOSE SERPL-MCNC: 100 MG/DL (ref 74–106)
GLUCOSE SERPL-MCNC: 114 MG/DL (ref 74–106)
HCT VFR BLD AUTO: 52.3 % (ref 36–46)
HGB BLD-MCNC: 17 G/DL (ref 12.2–16.2)
MCH RBC QN AUTO: 26.5 PG (ref 28–32)
MCV RBC AUTO: 81.6 FL (ref 80–100)
NRBC BLD QL AUTO: 0.1 %
OPIATES UR QL SCN: NEGATIVE
PCP UR QL SCN: NEGATIVE
POTASSIUM SERPL-SCNC: 5.1 MMOL/L (ref 3.5–5.1)
POTASSIUM SERPL-SCNC: 6.2 MMOL/L (ref 3.5–5.1)
PROT SERPL-MCNC: 5.9 G/DL (ref 6.4–8.2)
SODIUM SERPL-SCNC: 130 MMOL/L (ref 136–145)
SODIUM SERPL-SCNC: 132 MMOL/L (ref 136–145)

## 2018-09-20 RX ADMIN — FUROSEMIDE SCH MG: 10 INJECTION, SOLUTION INTRAMUSCULAR; INTRAVENOUS at 09:54

## 2018-09-20 RX ADMIN — AZITHROMYCIN MONOHYDRATE SCH MG: 250 TABLET ORAL at 09:54

## 2018-09-20 RX ADMIN — ALBUTEROL SULFATE SCH MG: 2.5 SOLUTION RESPIRATORY (INHALATION) at 06:33

## 2018-09-20 RX ADMIN — FENTANYL CITRATE SCH MLS/HR: 50 INJECTION, SOLUTION INTRAMUSCULAR; INTRAVENOUS at 11:25

## 2018-09-20 RX ADMIN — DRONEDARONE SCH MG: 400 TABLET, FILM COATED ORAL at 10:00

## 2018-09-20 RX ADMIN — APIXABAN SCH MG: 2.5 TABLET, FILM COATED ORAL at 09:55

## 2018-09-20 RX ADMIN — BUDESONIDE SCH MG: 0.5 SUSPENSION RESPIRATORY (INHALATION) at 18:14

## 2018-09-20 RX ADMIN — DRONEDARONE SCH MG: 400 TABLET, FILM COATED ORAL at 21:48

## 2018-09-20 RX ADMIN — DOPAMINE HYDROCHLORIDE IN DEXTROSE SCH MLS/HR: 1.6 INJECTION, SOLUTION INTRAVENOUS at 06:14

## 2018-09-20 RX ADMIN — IPRATROPIUM BROMIDE SCH MG: 0.5 SOLUTION RESPIRATORY (INHALATION) at 18:14

## 2018-09-20 RX ADMIN — RANOLAZINE SCH MG: 500 TABLET, FILM COATED, EXTENDED RELEASE ORAL at 21:48

## 2018-09-20 RX ADMIN — ALBUTEROL SULFATE SCH MG: 2.5 SOLUTION RESPIRATORY (INHALATION) at 12:35

## 2018-09-20 RX ADMIN — MIDAZOLAM SCH MLS/HR: 5 INJECTION, SOLUTION INTRAMUSCULAR; INTRAVENOUS at 15:14

## 2018-09-20 RX ADMIN — CARVEDILOL SCH MG: 3.12 TABLET, FILM COATED ORAL at 09:54

## 2018-09-20 RX ADMIN — MIDAZOLAM SCH MLS/HR: 5 INJECTION, SOLUTION INTRAMUSCULAR; INTRAVENOUS at 19:31

## 2018-09-20 RX ADMIN — BUDESONIDE SCH MG: 0.5 SUSPENSION RESPIRATORY (INHALATION) at 06:33

## 2018-09-20 RX ADMIN — IPRATROPIUM BROMIDE SCH MG: 0.5 SOLUTION RESPIRATORY (INHALATION) at 12:35

## 2018-09-20 RX ADMIN — IPRATROPIUM BROMIDE SCH MG: 0.5 SOLUTION RESPIRATORY (INHALATION) at 06:33

## 2018-09-20 RX ADMIN — ALBUTEROL SULFATE SCH MG: 2.5 SOLUTION RESPIRATORY (INHALATION) at 18:15

## 2018-09-20 RX ADMIN — MIDAZOLAM SCH MLS/HR: 5 INJECTION, SOLUTION INTRAMUSCULAR; INTRAVENOUS at 05:50

## 2018-09-20 RX ADMIN — RANOLAZINE SCH MG: 500 TABLET, FILM COATED, EXTENDED RELEASE ORAL at 09:55

## 2018-09-20 RX ADMIN — CEFTRIAXONE SODIUM SCH MLS/HR: 1 INJECTION, POWDER, FOR SOLUTION INTRAMUSCULAR; INTRAVENOUS at 09:53

## 2018-09-20 RX ADMIN — NOREPINEPHRINE BITARTRATE SCH MLS/HR: 1 INJECTION, SOLUTION, CONCENTRATE INTRAVENOUS at 23:16

## 2018-09-21 VITALS — DIASTOLIC BLOOD PRESSURE: 5 MMHG | SYSTOLIC BLOOD PRESSURE: 109 MMHG

## 2018-09-21 VITALS — DIASTOLIC BLOOD PRESSURE: 60 MMHG | SYSTOLIC BLOOD PRESSURE: 114 MMHG

## 2018-09-21 VITALS — SYSTOLIC BLOOD PRESSURE: 101 MMHG | DIASTOLIC BLOOD PRESSURE: 58 MMHG

## 2018-09-21 VITALS — SYSTOLIC BLOOD PRESSURE: 105 MMHG | DIASTOLIC BLOOD PRESSURE: 47 MMHG

## 2018-09-21 VITALS — SYSTOLIC BLOOD PRESSURE: 117 MMHG | DIASTOLIC BLOOD PRESSURE: 73 MMHG

## 2018-09-21 VITALS — SYSTOLIC BLOOD PRESSURE: 121 MMHG | DIASTOLIC BLOOD PRESSURE: 69 MMHG

## 2018-09-21 VITALS — SYSTOLIC BLOOD PRESSURE: 107 MMHG | DIASTOLIC BLOOD PRESSURE: 60 MMHG

## 2018-09-21 VITALS — SYSTOLIC BLOOD PRESSURE: 95 MMHG | DIASTOLIC BLOOD PRESSURE: 58 MMHG

## 2018-09-21 VITALS — DIASTOLIC BLOOD PRESSURE: 63 MMHG | SYSTOLIC BLOOD PRESSURE: 105 MMHG

## 2018-09-21 VITALS — DIASTOLIC BLOOD PRESSURE: 71 MMHG | SYSTOLIC BLOOD PRESSURE: 109 MMHG

## 2018-09-21 VITALS — SYSTOLIC BLOOD PRESSURE: 100 MMHG | DIASTOLIC BLOOD PRESSURE: 59 MMHG

## 2018-09-21 VITALS — SYSTOLIC BLOOD PRESSURE: 100 MMHG | DIASTOLIC BLOOD PRESSURE: 69 MMHG

## 2018-09-21 VITALS — SYSTOLIC BLOOD PRESSURE: 100 MMHG | DIASTOLIC BLOOD PRESSURE: 74 MMHG

## 2018-09-21 VITALS — DIASTOLIC BLOOD PRESSURE: 60 MMHG | SYSTOLIC BLOOD PRESSURE: 109 MMHG

## 2018-09-21 VITALS — SYSTOLIC BLOOD PRESSURE: 122 MMHG | DIASTOLIC BLOOD PRESSURE: 84 MMHG

## 2018-09-21 VITALS — SYSTOLIC BLOOD PRESSURE: 94 MMHG | DIASTOLIC BLOOD PRESSURE: 65 MMHG

## 2018-09-21 VITALS — DIASTOLIC BLOOD PRESSURE: 59 MMHG | SYSTOLIC BLOOD PRESSURE: 100 MMHG

## 2018-09-21 VITALS — SYSTOLIC BLOOD PRESSURE: 101 MMHG | DIASTOLIC BLOOD PRESSURE: 73 MMHG

## 2018-09-21 VITALS — SYSTOLIC BLOOD PRESSURE: 86 MMHG | DIASTOLIC BLOOD PRESSURE: 51 MMHG

## 2018-09-21 VITALS — DIASTOLIC BLOOD PRESSURE: 48 MMHG | SYSTOLIC BLOOD PRESSURE: 94 MMHG

## 2018-09-21 VITALS — DIASTOLIC BLOOD PRESSURE: 41 MMHG | SYSTOLIC BLOOD PRESSURE: 98 MMHG

## 2018-09-21 VITALS — SYSTOLIC BLOOD PRESSURE: 103 MMHG | DIASTOLIC BLOOD PRESSURE: 47 MMHG

## 2018-09-21 VITALS — SYSTOLIC BLOOD PRESSURE: 106 MMHG | DIASTOLIC BLOOD PRESSURE: 62 MMHG

## 2018-09-21 VITALS — DIASTOLIC BLOOD PRESSURE: 55 MMHG | SYSTOLIC BLOOD PRESSURE: 97 MMHG

## 2018-09-21 VITALS — DIASTOLIC BLOOD PRESSURE: 80 MMHG | SYSTOLIC BLOOD PRESSURE: 119 MMHG

## 2018-09-21 VITALS — DIASTOLIC BLOOD PRESSURE: 63 MMHG | SYSTOLIC BLOOD PRESSURE: 99 MMHG

## 2018-09-21 VITALS — SYSTOLIC BLOOD PRESSURE: 101 MMHG | DIASTOLIC BLOOD PRESSURE: 60 MMHG

## 2018-09-21 VITALS — SYSTOLIC BLOOD PRESSURE: 110 MMHG | DIASTOLIC BLOOD PRESSURE: 59 MMHG

## 2018-09-21 VITALS — SYSTOLIC BLOOD PRESSURE: 104 MMHG | DIASTOLIC BLOOD PRESSURE: 63 MMHG

## 2018-09-21 VITALS — DIASTOLIC BLOOD PRESSURE: 54 MMHG | SYSTOLIC BLOOD PRESSURE: 109 MMHG

## 2018-09-21 VITALS — SYSTOLIC BLOOD PRESSURE: 108 MMHG | DIASTOLIC BLOOD PRESSURE: 61 MMHG

## 2018-09-21 VITALS — SYSTOLIC BLOOD PRESSURE: 104 MMHG | DIASTOLIC BLOOD PRESSURE: 86 MMHG

## 2018-09-21 VITALS — DIASTOLIC BLOOD PRESSURE: 50 MMHG | SYSTOLIC BLOOD PRESSURE: 96 MMHG

## 2018-09-21 VITALS — SYSTOLIC BLOOD PRESSURE: 109 MMHG | DIASTOLIC BLOOD PRESSURE: 70 MMHG

## 2018-09-21 VITALS — DIASTOLIC BLOOD PRESSURE: 60 MMHG | SYSTOLIC BLOOD PRESSURE: 102 MMHG

## 2018-09-21 VITALS — SYSTOLIC BLOOD PRESSURE: 118 MMHG | DIASTOLIC BLOOD PRESSURE: 63 MMHG

## 2018-09-21 VITALS — SYSTOLIC BLOOD PRESSURE: 116 MMHG | DIASTOLIC BLOOD PRESSURE: 78 MMHG

## 2018-09-21 VITALS — SYSTOLIC BLOOD PRESSURE: 101 MMHG | DIASTOLIC BLOOD PRESSURE: 67 MMHG

## 2018-09-21 VITALS — DIASTOLIC BLOOD PRESSURE: 69 MMHG | SYSTOLIC BLOOD PRESSURE: 113 MMHG

## 2018-09-21 VITALS — SYSTOLIC BLOOD PRESSURE: 90 MMHG | DIASTOLIC BLOOD PRESSURE: 51 MMHG

## 2018-09-21 VITALS — SYSTOLIC BLOOD PRESSURE: 98 MMHG | DIASTOLIC BLOOD PRESSURE: 72 MMHG

## 2018-09-21 VITALS — SYSTOLIC BLOOD PRESSURE: 126 MMHG | DIASTOLIC BLOOD PRESSURE: 77 MMHG

## 2018-09-21 VITALS — SYSTOLIC BLOOD PRESSURE: 97 MMHG | DIASTOLIC BLOOD PRESSURE: 55 MMHG

## 2018-09-21 VITALS — SYSTOLIC BLOOD PRESSURE: 112 MMHG | DIASTOLIC BLOOD PRESSURE: 72 MMHG

## 2018-09-21 VITALS — DIASTOLIC BLOOD PRESSURE: 69 MMHG | SYSTOLIC BLOOD PRESSURE: 108 MMHG

## 2018-09-21 VITALS — SYSTOLIC BLOOD PRESSURE: 107 MMHG | DIASTOLIC BLOOD PRESSURE: 42 MMHG

## 2018-09-21 VITALS — DIASTOLIC BLOOD PRESSURE: 59 MMHG | SYSTOLIC BLOOD PRESSURE: 105 MMHG

## 2018-09-21 VITALS — SYSTOLIC BLOOD PRESSURE: 125 MMHG | DIASTOLIC BLOOD PRESSURE: 60 MMHG

## 2018-09-21 VITALS — SYSTOLIC BLOOD PRESSURE: 102 MMHG | DIASTOLIC BLOOD PRESSURE: 63 MMHG

## 2018-09-21 VITALS — SYSTOLIC BLOOD PRESSURE: 87 MMHG | DIASTOLIC BLOOD PRESSURE: 55 MMHG

## 2018-09-21 VITALS — DIASTOLIC BLOOD PRESSURE: 62 MMHG | SYSTOLIC BLOOD PRESSURE: 100 MMHG

## 2018-09-21 VITALS — SYSTOLIC BLOOD PRESSURE: 121 MMHG | DIASTOLIC BLOOD PRESSURE: 78 MMHG

## 2018-09-21 VITALS — DIASTOLIC BLOOD PRESSURE: 77 MMHG | SYSTOLIC BLOOD PRESSURE: 155 MMHG

## 2018-09-21 VITALS — DIASTOLIC BLOOD PRESSURE: 77 MMHG | SYSTOLIC BLOOD PRESSURE: 115 MMHG

## 2018-09-21 VITALS — DIASTOLIC BLOOD PRESSURE: 62 MMHG | SYSTOLIC BLOOD PRESSURE: 105 MMHG

## 2018-09-21 VITALS — DIASTOLIC BLOOD PRESSURE: 54 MMHG | SYSTOLIC BLOOD PRESSURE: 110 MMHG

## 2018-09-21 VITALS — DIASTOLIC BLOOD PRESSURE: 82 MMHG | SYSTOLIC BLOOD PRESSURE: 102 MMHG

## 2018-09-21 VITALS — DIASTOLIC BLOOD PRESSURE: 47 MMHG | SYSTOLIC BLOOD PRESSURE: 101 MMHG

## 2018-09-21 VITALS — DIASTOLIC BLOOD PRESSURE: 57 MMHG | SYSTOLIC BLOOD PRESSURE: 112 MMHG

## 2018-09-21 VITALS — DIASTOLIC BLOOD PRESSURE: 60 MMHG | SYSTOLIC BLOOD PRESSURE: 101 MMHG

## 2018-09-21 VITALS — DIASTOLIC BLOOD PRESSURE: 84 MMHG | SYSTOLIC BLOOD PRESSURE: 122 MMHG

## 2018-09-21 VITALS — DIASTOLIC BLOOD PRESSURE: 55 MMHG | SYSTOLIC BLOOD PRESSURE: 98 MMHG

## 2018-09-21 VITALS — SYSTOLIC BLOOD PRESSURE: 99 MMHG | DIASTOLIC BLOOD PRESSURE: 68 MMHG

## 2018-09-21 VITALS — DIASTOLIC BLOOD PRESSURE: 64 MMHG | SYSTOLIC BLOOD PRESSURE: 97 MMHG

## 2018-09-21 VITALS — DIASTOLIC BLOOD PRESSURE: 56 MMHG | SYSTOLIC BLOOD PRESSURE: 90 MMHG

## 2018-09-21 VITALS — SYSTOLIC BLOOD PRESSURE: 93 MMHG | DIASTOLIC BLOOD PRESSURE: 44 MMHG

## 2018-09-21 VITALS — DIASTOLIC BLOOD PRESSURE: 66 MMHG | SYSTOLIC BLOOD PRESSURE: 84 MMHG

## 2018-09-21 VITALS — SYSTOLIC BLOOD PRESSURE: 104 MMHG | DIASTOLIC BLOOD PRESSURE: 58 MMHG

## 2018-09-21 VITALS — SYSTOLIC BLOOD PRESSURE: 101 MMHG | DIASTOLIC BLOOD PRESSURE: 54 MMHG

## 2018-09-21 VITALS — SYSTOLIC BLOOD PRESSURE: 125 MMHG | DIASTOLIC BLOOD PRESSURE: 80 MMHG

## 2018-09-21 VITALS — SYSTOLIC BLOOD PRESSURE: 99 MMHG | DIASTOLIC BLOOD PRESSURE: 63 MMHG

## 2018-09-21 VITALS — DIASTOLIC BLOOD PRESSURE: 66 MMHG | SYSTOLIC BLOOD PRESSURE: 115 MMHG

## 2018-09-21 VITALS — DIASTOLIC BLOOD PRESSURE: 55 MMHG | SYSTOLIC BLOOD PRESSURE: 103 MMHG

## 2018-09-21 VITALS — SYSTOLIC BLOOD PRESSURE: 95 MMHG | DIASTOLIC BLOOD PRESSURE: 48 MMHG

## 2018-09-21 VITALS — SYSTOLIC BLOOD PRESSURE: 84 MMHG | DIASTOLIC BLOOD PRESSURE: 66 MMHG

## 2018-09-21 VITALS — SYSTOLIC BLOOD PRESSURE: 109 MMHG | DIASTOLIC BLOOD PRESSURE: 64 MMHG

## 2018-09-21 VITALS — DIASTOLIC BLOOD PRESSURE: 57 MMHG | SYSTOLIC BLOOD PRESSURE: 95 MMHG

## 2018-09-21 VITALS — SYSTOLIC BLOOD PRESSURE: 90 MMHG | DIASTOLIC BLOOD PRESSURE: 69 MMHG

## 2018-09-21 VITALS — SYSTOLIC BLOOD PRESSURE: 92 MMHG | DIASTOLIC BLOOD PRESSURE: 56 MMHG

## 2018-09-21 VITALS — DIASTOLIC BLOOD PRESSURE: 44 MMHG | SYSTOLIC BLOOD PRESSURE: 93 MMHG

## 2018-09-21 VITALS — DIASTOLIC BLOOD PRESSURE: 58 MMHG | SYSTOLIC BLOOD PRESSURE: 99 MMHG

## 2018-09-21 VITALS — SYSTOLIC BLOOD PRESSURE: 107 MMHG | DIASTOLIC BLOOD PRESSURE: 58 MMHG

## 2018-09-21 VITALS — DIASTOLIC BLOOD PRESSURE: 68 MMHG | SYSTOLIC BLOOD PRESSURE: 101 MMHG

## 2018-09-21 VITALS — DIASTOLIC BLOOD PRESSURE: 58 MMHG | SYSTOLIC BLOOD PRESSURE: 95 MMHG

## 2018-09-21 VITALS — SYSTOLIC BLOOD PRESSURE: 106 MMHG | DIASTOLIC BLOOD PRESSURE: 65 MMHG

## 2018-09-21 VITALS — SYSTOLIC BLOOD PRESSURE: 93 MMHG | DIASTOLIC BLOOD PRESSURE: 69 MMHG

## 2018-09-21 VITALS — SYSTOLIC BLOOD PRESSURE: 102 MMHG | DIASTOLIC BLOOD PRESSURE: 66 MMHG

## 2018-09-21 VITALS — DIASTOLIC BLOOD PRESSURE: 70 MMHG | SYSTOLIC BLOOD PRESSURE: 120 MMHG

## 2018-09-21 VITALS — DIASTOLIC BLOOD PRESSURE: 77 MMHG | SYSTOLIC BLOOD PRESSURE: 127 MMHG

## 2018-09-21 VITALS — DIASTOLIC BLOOD PRESSURE: 64 MMHG | SYSTOLIC BLOOD PRESSURE: 98 MMHG

## 2018-09-21 VITALS — DIASTOLIC BLOOD PRESSURE: 50 MMHG | SYSTOLIC BLOOD PRESSURE: 106 MMHG

## 2018-09-21 VITALS — SYSTOLIC BLOOD PRESSURE: 155 MMHG | DIASTOLIC BLOOD PRESSURE: 77 MMHG

## 2018-09-21 VITALS — SYSTOLIC BLOOD PRESSURE: 97 MMHG | DIASTOLIC BLOOD PRESSURE: 51 MMHG

## 2018-09-21 VITALS — SYSTOLIC BLOOD PRESSURE: 112 MMHG | DIASTOLIC BLOOD PRESSURE: 49 MMHG

## 2018-09-21 VITALS — DIASTOLIC BLOOD PRESSURE: 57 MMHG | SYSTOLIC BLOOD PRESSURE: 101 MMHG

## 2018-09-21 VITALS — SYSTOLIC BLOOD PRESSURE: 108 MMHG | DIASTOLIC BLOOD PRESSURE: 64 MMHG

## 2018-09-21 LAB
ALBUMIN SERPL-MCNC: 2.3 G/DL (ref 3.4–5)
ALP SERPL-CCNC: 73 U/L (ref 45–117)
ALT SERPL-CCNC: 465 U/L (ref 13–56)
ANION GAP SERPL CALCULATED.3IONS-SCNC: 8 MMOL/L (ref 5–15)
BILIRUB SERPL-MCNC: 2.5 MG/DL (ref 0.2–1)
BUN SERPL-MCNC: 42 MG/DL (ref 7–18)
BUN/CREAT SERPL: 34.1
CALCIUM SERPL-MCNC: 8 MG/DL (ref 8.5–10.1)
CHLORIDE SERPL-SCNC: 93 MMOL/L (ref 98–107)
CO2 SERPL-SCNC: 34 MMOL/L (ref 21–32)
GLUCOSE SERPL-MCNC: 115 MG/DL (ref 74–106)
HCT VFR BLD AUTO: 41.2 % (ref 36–46)
HGB BLD-MCNC: 13.4 G/DL (ref 12.2–16.2)
INR PPP: 1.48 (ref 0.9–1.15)
MAGNESIUM SERPL-MCNC: 2.3 MG/DL (ref 1.6–2.6)
MCH RBC QN AUTO: 26.1 PG (ref 28–32)
MCV RBC AUTO: 80.4 FL (ref 80–100)
POTASSIUM SERPL-SCNC: 4.7 MMOL/L (ref 3.5–5.1)
PROT SERPL-MCNC: 4.9 G/DL (ref 6.4–8.2)
PROTHROMBIN TIME: 15.5 SEC (ref 9.27–12.13)
SODIUM SERPL-SCNC: 135 MMOL/L (ref 136–145)

## 2018-09-21 PROCEDURE — 02HV33Z INSERTION OF INFUSION DEVICE INTO SUPERIOR VENA CAVA, PERCUTANEOUS APPROACH: ICD-10-PCS | Performed by: NURSE PRACTITIONER

## 2018-09-21 RX ADMIN — RANOLAZINE SCH MG: 500 TABLET, FILM COATED, EXTENDED RELEASE ORAL at 10:00

## 2018-09-21 RX ADMIN — FUROSEMIDE SCH MG: 10 INJECTION, SOLUTION INTRAMUSCULAR; INTRAVENOUS at 13:00

## 2018-09-21 RX ADMIN — BUDESONIDE SCH MG: 0.5 SUSPENSION RESPIRATORY (INHALATION) at 06:04

## 2018-09-21 RX ADMIN — DRONEDARONE SCH MG: 400 TABLET, FILM COATED ORAL at 22:53

## 2018-09-21 RX ADMIN — NOREPINEPHRINE BITARTRATE SCH MLS/HR: 1 INJECTION, SOLUTION, CONCENTRATE INTRAVENOUS at 20:51

## 2018-09-21 RX ADMIN — MIDAZOLAM SCH MLS/HR: 5 INJECTION, SOLUTION INTRAMUSCULAR; INTRAVENOUS at 04:57

## 2018-09-21 RX ADMIN — FENTANYL CITRATE SCH MLS/HR: 50 INJECTION, SOLUTION INTRAMUSCULAR; INTRAVENOUS at 09:04

## 2018-09-21 RX ADMIN — SODIUM CHLORIDE SCH ML: 9 INJECTION INTRAMUSCULAR; INTRAVENOUS; SUBCUTANEOUS at 22:53

## 2018-09-21 RX ADMIN — DOPAMINE HYDROCHLORIDE IN DEXTROSE SCH MLS/HR: 1.6 INJECTION, SOLUTION INTRAVENOUS at 02:38

## 2018-09-21 RX ADMIN — ENOXAPARIN SODIUM SCH MG: 40 INJECTION SUBCUTANEOUS at 11:01

## 2018-09-21 RX ADMIN — MEROPENEM SCH MLS/HR: 1 INJECTION, POWDER, FOR SOLUTION INTRAVENOUS at 09:04

## 2018-09-21 RX ADMIN — DOPAMINE HYDROCHLORIDE IN DEXTROSE SCH MLS/HR: 1.6 INJECTION, SOLUTION INTRAVENOUS at 15:15

## 2018-09-21 RX ADMIN — IPRATROPIUM BROMIDE SCH MG: 0.5 SOLUTION RESPIRATORY (INHALATION) at 00:00

## 2018-09-21 RX ADMIN — MEROPENEM SCH MLS/HR: 1 INJECTION, POWDER, FOR SOLUTION INTRAVENOUS at 17:43

## 2018-09-21 RX ADMIN — PANTOPRAZOLE SODIUM SCH MG: 40 INJECTION, POWDER, FOR SOLUTION INTRAVENOUS at 11:00

## 2018-09-21 RX ADMIN — IPRATROPIUM BROMIDE SCH MG: 0.5 SOLUTION RESPIRATORY (INHALATION) at 06:04

## 2018-09-21 RX ADMIN — MIDAZOLAM SCH MLS/HR: 5 INJECTION, SOLUTION INTRAMUSCULAR; INTRAVENOUS at 00:22

## 2018-09-21 RX ADMIN — ALBUTEROL SULFATE SCH MG: 2.5 SOLUTION RESPIRATORY (INHALATION) at 11:42

## 2018-09-21 RX ADMIN — DRONEDARONE SCH MG: 400 TABLET, FILM COATED ORAL at 11:01

## 2018-09-21 RX ADMIN — Medication SCH ML: at 20:52

## 2018-09-21 RX ADMIN — IPRATROPIUM BROMIDE SCH MG: 0.5 SOLUTION RESPIRATORY (INHALATION) at 11:42

## 2018-09-21 RX ADMIN — ALBUTEROL SULFATE SCH MG: 2.5 SOLUTION RESPIRATORY (INHALATION) at 18:32

## 2018-09-21 RX ADMIN — MIDAZOLAM SCH MLS/HR: 5 INJECTION, SOLUTION INTRAMUSCULAR; INTRAVENOUS at 21:10

## 2018-09-21 RX ADMIN — IPRATROPIUM BROMIDE SCH MG: 0.5 SOLUTION RESPIRATORY (INHALATION) at 18:32

## 2018-09-21 RX ADMIN — MIDAZOLAM SCH MLS/HR: 5 INJECTION, SOLUTION INTRAMUSCULAR; INTRAVENOUS at 09:04

## 2018-09-21 RX ADMIN — BUDESONIDE SCH MG: 0.5 SUSPENSION RESPIRATORY (INHALATION) at 18:32

## 2018-09-21 RX ADMIN — RANOLAZINE SCH MG: 500 TABLET, FILM COATED, EXTENDED RELEASE ORAL at 22:53

## 2018-09-21 RX ADMIN — ALBUTEROL SULFATE SCH MG: 2.5 SOLUTION RESPIRATORY (INHALATION) at 06:04

## 2018-09-21 RX ADMIN — ALBUTEROL SULFATE SCH MG: 2.5 SOLUTION RESPIRATORY (INHALATION) at 00:00

## 2018-09-22 VITALS — SYSTOLIC BLOOD PRESSURE: 99 MMHG | DIASTOLIC BLOOD PRESSURE: 63 MMHG

## 2018-09-22 VITALS — SYSTOLIC BLOOD PRESSURE: 105 MMHG | DIASTOLIC BLOOD PRESSURE: 60 MMHG

## 2018-09-22 VITALS — DIASTOLIC BLOOD PRESSURE: 69 MMHG | SYSTOLIC BLOOD PRESSURE: 99 MMHG

## 2018-09-22 VITALS — DIASTOLIC BLOOD PRESSURE: 63 MMHG | SYSTOLIC BLOOD PRESSURE: 106 MMHG

## 2018-09-22 VITALS — SYSTOLIC BLOOD PRESSURE: 96 MMHG | DIASTOLIC BLOOD PRESSURE: 70 MMHG

## 2018-09-22 VITALS — SYSTOLIC BLOOD PRESSURE: 106 MMHG | DIASTOLIC BLOOD PRESSURE: 49 MMHG

## 2018-09-22 VITALS — SYSTOLIC BLOOD PRESSURE: 134 MMHG | DIASTOLIC BLOOD PRESSURE: 69 MMHG

## 2018-09-22 VITALS — SYSTOLIC BLOOD PRESSURE: 122 MMHG | DIASTOLIC BLOOD PRESSURE: 61 MMHG

## 2018-09-22 VITALS — DIASTOLIC BLOOD PRESSURE: 67 MMHG | SYSTOLIC BLOOD PRESSURE: 103 MMHG

## 2018-09-22 VITALS — DIASTOLIC BLOOD PRESSURE: 63 MMHG | SYSTOLIC BLOOD PRESSURE: 113 MMHG

## 2018-09-22 VITALS — DIASTOLIC BLOOD PRESSURE: 68 MMHG | SYSTOLIC BLOOD PRESSURE: 110 MMHG

## 2018-09-22 VITALS — SYSTOLIC BLOOD PRESSURE: 94 MMHG | DIASTOLIC BLOOD PRESSURE: 48 MMHG

## 2018-09-22 VITALS — SYSTOLIC BLOOD PRESSURE: 93 MMHG | DIASTOLIC BLOOD PRESSURE: 66 MMHG

## 2018-09-22 VITALS — DIASTOLIC BLOOD PRESSURE: 70 MMHG | SYSTOLIC BLOOD PRESSURE: 105 MMHG

## 2018-09-22 VITALS — DIASTOLIC BLOOD PRESSURE: 65 MMHG | SYSTOLIC BLOOD PRESSURE: 97 MMHG

## 2018-09-22 VITALS — SYSTOLIC BLOOD PRESSURE: 101 MMHG | DIASTOLIC BLOOD PRESSURE: 57 MMHG

## 2018-09-22 VITALS — DIASTOLIC BLOOD PRESSURE: 66 MMHG | SYSTOLIC BLOOD PRESSURE: 107 MMHG

## 2018-09-22 VITALS — SYSTOLIC BLOOD PRESSURE: 108 MMHG | DIASTOLIC BLOOD PRESSURE: 68 MMHG

## 2018-09-22 VITALS — DIASTOLIC BLOOD PRESSURE: 55 MMHG | SYSTOLIC BLOOD PRESSURE: 106 MMHG

## 2018-09-22 VITALS — SYSTOLIC BLOOD PRESSURE: 116 MMHG | DIASTOLIC BLOOD PRESSURE: 68 MMHG

## 2018-09-22 VITALS — SYSTOLIC BLOOD PRESSURE: 107 MMHG | DIASTOLIC BLOOD PRESSURE: 56 MMHG

## 2018-09-22 VITALS — DIASTOLIC BLOOD PRESSURE: 81 MMHG | SYSTOLIC BLOOD PRESSURE: 123 MMHG

## 2018-09-22 VITALS — SYSTOLIC BLOOD PRESSURE: 118 MMHG | DIASTOLIC BLOOD PRESSURE: 73 MMHG

## 2018-09-22 VITALS — SYSTOLIC BLOOD PRESSURE: 93 MMHG | DIASTOLIC BLOOD PRESSURE: 69 MMHG

## 2018-09-22 VITALS — DIASTOLIC BLOOD PRESSURE: 68 MMHG | SYSTOLIC BLOOD PRESSURE: 105 MMHG

## 2018-09-22 VITALS — SYSTOLIC BLOOD PRESSURE: 102 MMHG | DIASTOLIC BLOOD PRESSURE: 64 MMHG

## 2018-09-22 VITALS — SYSTOLIC BLOOD PRESSURE: 110 MMHG | DIASTOLIC BLOOD PRESSURE: 65 MMHG

## 2018-09-22 VITALS — SYSTOLIC BLOOD PRESSURE: 94 MMHG | DIASTOLIC BLOOD PRESSURE: 60 MMHG

## 2018-09-22 VITALS — SYSTOLIC BLOOD PRESSURE: 108 MMHG | DIASTOLIC BLOOD PRESSURE: 72 MMHG

## 2018-09-22 VITALS — DIASTOLIC BLOOD PRESSURE: 57 MMHG | SYSTOLIC BLOOD PRESSURE: 110 MMHG

## 2018-09-22 VITALS — SYSTOLIC BLOOD PRESSURE: 100 MMHG | DIASTOLIC BLOOD PRESSURE: 63 MMHG

## 2018-09-22 VITALS — DIASTOLIC BLOOD PRESSURE: 78 MMHG | SYSTOLIC BLOOD PRESSURE: 108 MMHG

## 2018-09-22 VITALS — SYSTOLIC BLOOD PRESSURE: 116 MMHG | DIASTOLIC BLOOD PRESSURE: 69 MMHG

## 2018-09-22 VITALS — SYSTOLIC BLOOD PRESSURE: 113 MMHG | DIASTOLIC BLOOD PRESSURE: 66 MMHG

## 2018-09-22 VITALS — SYSTOLIC BLOOD PRESSURE: 113 MMHG | DIASTOLIC BLOOD PRESSURE: 63 MMHG

## 2018-09-22 VITALS — SYSTOLIC BLOOD PRESSURE: 122 MMHG | DIASTOLIC BLOOD PRESSURE: 48 MMHG

## 2018-09-22 VITALS — DIASTOLIC BLOOD PRESSURE: 62 MMHG | SYSTOLIC BLOOD PRESSURE: 115 MMHG

## 2018-09-22 VITALS — DIASTOLIC BLOOD PRESSURE: 64 MMHG | SYSTOLIC BLOOD PRESSURE: 117 MMHG

## 2018-09-22 VITALS — DIASTOLIC BLOOD PRESSURE: 69 MMHG | SYSTOLIC BLOOD PRESSURE: 101 MMHG

## 2018-09-22 VITALS — DIASTOLIC BLOOD PRESSURE: 65 MMHG | SYSTOLIC BLOOD PRESSURE: 108 MMHG

## 2018-09-22 VITALS — SYSTOLIC BLOOD PRESSURE: 109 MMHG | DIASTOLIC BLOOD PRESSURE: 66 MMHG

## 2018-09-22 VITALS — DIASTOLIC BLOOD PRESSURE: 48 MMHG | SYSTOLIC BLOOD PRESSURE: 94 MMHG

## 2018-09-22 VITALS — DIASTOLIC BLOOD PRESSURE: 71 MMHG | SYSTOLIC BLOOD PRESSURE: 118 MMHG

## 2018-09-22 VITALS — SYSTOLIC BLOOD PRESSURE: 123 MMHG | DIASTOLIC BLOOD PRESSURE: 63 MMHG

## 2018-09-22 VITALS — DIASTOLIC BLOOD PRESSURE: 72 MMHG | SYSTOLIC BLOOD PRESSURE: 121 MMHG

## 2018-09-22 VITALS — SYSTOLIC BLOOD PRESSURE: 105 MMHG | DIASTOLIC BLOOD PRESSURE: 75 MMHG

## 2018-09-22 VITALS — DIASTOLIC BLOOD PRESSURE: 75 MMHG | SYSTOLIC BLOOD PRESSURE: 102 MMHG

## 2018-09-22 VITALS — DIASTOLIC BLOOD PRESSURE: 73 MMHG | SYSTOLIC BLOOD PRESSURE: 112 MMHG

## 2018-09-22 VITALS — DIASTOLIC BLOOD PRESSURE: 69 MMHG | SYSTOLIC BLOOD PRESSURE: 102 MMHG

## 2018-09-22 VITALS — SYSTOLIC BLOOD PRESSURE: 109 MMHG | DIASTOLIC BLOOD PRESSURE: 58 MMHG

## 2018-09-22 VITALS — SYSTOLIC BLOOD PRESSURE: 115 MMHG | DIASTOLIC BLOOD PRESSURE: 60 MMHG

## 2018-09-22 VITALS — DIASTOLIC BLOOD PRESSURE: 62 MMHG | SYSTOLIC BLOOD PRESSURE: 106 MMHG

## 2018-09-22 VITALS — SYSTOLIC BLOOD PRESSURE: 114 MMHG | DIASTOLIC BLOOD PRESSURE: 72 MMHG

## 2018-09-22 VITALS — SYSTOLIC BLOOD PRESSURE: 116 MMHG | DIASTOLIC BLOOD PRESSURE: 80 MMHG

## 2018-09-22 VITALS — SYSTOLIC BLOOD PRESSURE: 116 MMHG | DIASTOLIC BLOOD PRESSURE: 67 MMHG

## 2018-09-22 VITALS — DIASTOLIC BLOOD PRESSURE: 57 MMHG | SYSTOLIC BLOOD PRESSURE: 108 MMHG

## 2018-09-22 VITALS — DIASTOLIC BLOOD PRESSURE: 61 MMHG | SYSTOLIC BLOOD PRESSURE: 104 MMHG

## 2018-09-22 VITALS — DIASTOLIC BLOOD PRESSURE: 56 MMHG | SYSTOLIC BLOOD PRESSURE: 96 MMHG

## 2018-09-22 VITALS — DIASTOLIC BLOOD PRESSURE: 68 MMHG | SYSTOLIC BLOOD PRESSURE: 111 MMHG

## 2018-09-22 VITALS — SYSTOLIC BLOOD PRESSURE: 106 MMHG | DIASTOLIC BLOOD PRESSURE: 63 MMHG

## 2018-09-22 VITALS — SYSTOLIC BLOOD PRESSURE: 108 MMHG | DIASTOLIC BLOOD PRESSURE: 65 MMHG

## 2018-09-22 VITALS — DIASTOLIC BLOOD PRESSURE: 69 MMHG | SYSTOLIC BLOOD PRESSURE: 103 MMHG

## 2018-09-22 VITALS — DIASTOLIC BLOOD PRESSURE: 62 MMHG | SYSTOLIC BLOOD PRESSURE: 102 MMHG

## 2018-09-22 VITALS — DIASTOLIC BLOOD PRESSURE: 47 MMHG | SYSTOLIC BLOOD PRESSURE: 106 MMHG

## 2018-09-22 VITALS — DIASTOLIC BLOOD PRESSURE: 66 MMHG | SYSTOLIC BLOOD PRESSURE: 103 MMHG

## 2018-09-22 VITALS — SYSTOLIC BLOOD PRESSURE: 106 MMHG | DIASTOLIC BLOOD PRESSURE: 57 MMHG

## 2018-09-22 VITALS — DIASTOLIC BLOOD PRESSURE: 59 MMHG | SYSTOLIC BLOOD PRESSURE: 101 MMHG

## 2018-09-22 VITALS — SYSTOLIC BLOOD PRESSURE: 109 MMHG | DIASTOLIC BLOOD PRESSURE: 45 MMHG

## 2018-09-22 VITALS — DIASTOLIC BLOOD PRESSURE: 69 MMHG | SYSTOLIC BLOOD PRESSURE: 111 MMHG

## 2018-09-22 VITALS — DIASTOLIC BLOOD PRESSURE: 64 MMHG | SYSTOLIC BLOOD PRESSURE: 113 MMHG

## 2018-09-22 VITALS — DIASTOLIC BLOOD PRESSURE: 66 MMHG | SYSTOLIC BLOOD PRESSURE: 113 MMHG

## 2018-09-22 VITALS — SYSTOLIC BLOOD PRESSURE: 116 MMHG | DIASTOLIC BLOOD PRESSURE: 50 MMHG

## 2018-09-22 VITALS — DIASTOLIC BLOOD PRESSURE: 63 MMHG | SYSTOLIC BLOOD PRESSURE: 100 MMHG

## 2018-09-22 VITALS — DIASTOLIC BLOOD PRESSURE: 57 MMHG | SYSTOLIC BLOOD PRESSURE: 109 MMHG

## 2018-09-22 VITALS — SYSTOLIC BLOOD PRESSURE: 115 MMHG | DIASTOLIC BLOOD PRESSURE: 58 MMHG

## 2018-09-22 VITALS — SYSTOLIC BLOOD PRESSURE: 117 MMHG | DIASTOLIC BLOOD PRESSURE: 70 MMHG

## 2018-09-22 VITALS — SYSTOLIC BLOOD PRESSURE: 115 MMHG | DIASTOLIC BLOOD PRESSURE: 66 MMHG

## 2018-09-22 VITALS — DIASTOLIC BLOOD PRESSURE: 60 MMHG | SYSTOLIC BLOOD PRESSURE: 124 MMHG

## 2018-09-22 VITALS — SYSTOLIC BLOOD PRESSURE: 113 MMHG | DIASTOLIC BLOOD PRESSURE: 70 MMHG

## 2018-09-22 VITALS — DIASTOLIC BLOOD PRESSURE: 60 MMHG | SYSTOLIC BLOOD PRESSURE: 117 MMHG

## 2018-09-22 VITALS — SYSTOLIC BLOOD PRESSURE: 118 MMHG | DIASTOLIC BLOOD PRESSURE: 55 MMHG

## 2018-09-22 VITALS — DIASTOLIC BLOOD PRESSURE: 60 MMHG | SYSTOLIC BLOOD PRESSURE: 96 MMHG

## 2018-09-22 VITALS — SYSTOLIC BLOOD PRESSURE: 123 MMHG | DIASTOLIC BLOOD PRESSURE: 71 MMHG

## 2018-09-22 VITALS — SYSTOLIC BLOOD PRESSURE: 118 MMHG | DIASTOLIC BLOOD PRESSURE: 71 MMHG

## 2018-09-22 VITALS — SYSTOLIC BLOOD PRESSURE: 105 MMHG | DIASTOLIC BLOOD PRESSURE: 53 MMHG

## 2018-09-22 VITALS — DIASTOLIC BLOOD PRESSURE: 59 MMHG | SYSTOLIC BLOOD PRESSURE: 99 MMHG

## 2018-09-22 VITALS — SYSTOLIC BLOOD PRESSURE: 107 MMHG | DIASTOLIC BLOOD PRESSURE: 68 MMHG

## 2018-09-22 VITALS — SYSTOLIC BLOOD PRESSURE: 119 MMHG | DIASTOLIC BLOOD PRESSURE: 57 MMHG

## 2018-09-22 VITALS — DIASTOLIC BLOOD PRESSURE: 60 MMHG | SYSTOLIC BLOOD PRESSURE: 100 MMHG

## 2018-09-22 VITALS — DIASTOLIC BLOOD PRESSURE: 55 MMHG | SYSTOLIC BLOOD PRESSURE: 111 MMHG

## 2018-09-22 VITALS — SYSTOLIC BLOOD PRESSURE: 96 MMHG | DIASTOLIC BLOOD PRESSURE: 68 MMHG

## 2018-09-22 VITALS — SYSTOLIC BLOOD PRESSURE: 100 MMHG | DIASTOLIC BLOOD PRESSURE: 68 MMHG

## 2018-09-22 VITALS — SYSTOLIC BLOOD PRESSURE: 111 MMHG | DIASTOLIC BLOOD PRESSURE: 70 MMHG

## 2018-09-22 VITALS — DIASTOLIC BLOOD PRESSURE: 83 MMHG | SYSTOLIC BLOOD PRESSURE: 116 MMHG

## 2018-09-22 VITALS — SYSTOLIC BLOOD PRESSURE: 110 MMHG | DIASTOLIC BLOOD PRESSURE: 57 MMHG

## 2018-09-22 VITALS — DIASTOLIC BLOOD PRESSURE: 71 MMHG | SYSTOLIC BLOOD PRESSURE: 105 MMHG

## 2018-09-22 VITALS — SYSTOLIC BLOOD PRESSURE: 102 MMHG | DIASTOLIC BLOOD PRESSURE: 63 MMHG

## 2018-09-22 VITALS — SYSTOLIC BLOOD PRESSURE: 113 MMHG | DIASTOLIC BLOOD PRESSURE: 75 MMHG

## 2018-09-22 LAB
ALBUMIN SERPL-MCNC: 2.1 G/DL (ref 3.4–5)
ALP SERPL-CCNC: 75 U/L (ref 45–117)
ALT SERPL-CCNC: 333 U/L (ref 13–56)
ANION GAP SERPL CALCULATED.3IONS-SCNC: 6 MMOL/L (ref 5–15)
BILIRUB SERPL-MCNC: 3.1 MG/DL (ref 0.2–1)
BUN SERPL-MCNC: 29 MG/DL (ref 7–18)
BUN/CREAT SERPL: 33
CALCIUM SERPL-MCNC: 7.9 MG/DL (ref 8.5–10.1)
CHLORIDE SERPL-SCNC: 94 MMOL/L (ref 98–107)
CO2 SERPL-SCNC: 36 MMOL/L (ref 21–32)
GLUCOSE SERPL-MCNC: 154 MG/DL (ref 74–106)
HCT VFR BLD AUTO: 37.5 % (ref 36–46)
HGB BLD-MCNC: 12.1 G/DL (ref 12.2–16.2)
MCH RBC QN AUTO: 26.2 PG (ref 28–32)
MCV RBC AUTO: 81.2 FL (ref 80–100)
NRBC BLD QL AUTO: 0 %
POTASSIUM SERPL-SCNC: 3.7 MMOL/L (ref 3.5–5.1)
PROT SERPL-MCNC: 5 G/DL (ref 6.4–8.2)
SODIUM SERPL-SCNC: 136 MMOL/L (ref 136–145)

## 2018-09-22 RX ADMIN — DOPAMINE HYDROCHLORIDE IN DEXTROSE SCH MLS/HR: 1.6 INJECTION, SOLUTION INTRAVENOUS at 22:29

## 2018-09-22 RX ADMIN — BUDESONIDE SCH MG: 0.5 SUSPENSION RESPIRATORY (INHALATION) at 06:00

## 2018-09-22 RX ADMIN — FENTANYL CITRATE SCH MLS/HR: 50 INJECTION, SOLUTION INTRAMUSCULAR; INTRAVENOUS at 19:42

## 2018-09-22 RX ADMIN — RANOLAZINE SCH MG: 500 TABLET, FILM COATED, EXTENDED RELEASE ORAL at 22:00

## 2018-09-22 RX ADMIN — IPRATROPIUM BROMIDE SCH MG: 0.5 SOLUTION RESPIRATORY (INHALATION) at 00:11

## 2018-09-22 RX ADMIN — IPRATROPIUM BROMIDE SCH MG: 0.5 SOLUTION RESPIRATORY (INHALATION) at 18:16

## 2018-09-22 RX ADMIN — ENOXAPARIN SODIUM SCH MG: 40 INJECTION SUBCUTANEOUS at 09:33

## 2018-09-22 RX ADMIN — SODIUM CHLORIDE SCH ML: 9 INJECTION INTRAMUSCULAR; INTRAVENOUS; SUBCUTANEOUS at 22:00

## 2018-09-22 RX ADMIN — FUROSEMIDE SCH MG: 10 INJECTION, SOLUTION INTRAMUSCULAR; INTRAVENOUS at 09:33

## 2018-09-22 RX ADMIN — RANOLAZINE SCH MG: 500 TABLET, FILM COATED, EXTENDED RELEASE ORAL at 09:33

## 2018-09-22 RX ADMIN — DRONEDARONE SCH MG: 400 TABLET, FILM COATED ORAL at 22:00

## 2018-09-22 RX ADMIN — ALBUTEROL SULFATE SCH MG: 2.5 SOLUTION RESPIRATORY (INHALATION) at 00:11

## 2018-09-22 RX ADMIN — ALBUTEROL SULFATE SCH MG: 2.5 SOLUTION RESPIRATORY (INHALATION) at 06:00

## 2018-09-22 RX ADMIN — IPRATROPIUM BROMIDE SCH MG: 0.5 SOLUTION RESPIRATORY (INHALATION) at 06:00

## 2018-09-22 RX ADMIN — MIDAZOLAM SCH MLS/HR: 5 INJECTION, SOLUTION INTRAMUSCULAR; INTRAVENOUS at 09:34

## 2018-09-22 RX ADMIN — ALBUTEROL SULFATE SCH MG: 2.5 SOLUTION RESPIRATORY (INHALATION) at 13:14

## 2018-09-22 RX ADMIN — MIDAZOLAM SCH MLS/HR: 5 INJECTION, SOLUTION INTRAMUSCULAR; INTRAVENOUS at 23:08

## 2018-09-22 RX ADMIN — DOPAMINE HYDROCHLORIDE IN DEXTROSE SCH MLS/HR: 1.6 INJECTION, SOLUTION INTRAVENOUS at 07:52

## 2018-09-22 RX ADMIN — BUDESONIDE SCH MG: 0.5 SUSPENSION RESPIRATORY (INHALATION) at 18:16

## 2018-09-22 RX ADMIN — MEROPENEM SCH MLS/HR: 1 INJECTION, POWDER, FOR SOLUTION INTRAVENOUS at 00:56

## 2018-09-22 RX ADMIN — MEROPENEM SCH MLS/HR: 1 INJECTION, POWDER, FOR SOLUTION INTRAVENOUS at 17:06

## 2018-09-22 RX ADMIN — SODIUM CHLORIDE SCH ML: 9 INJECTION INTRAMUSCULAR; INTRAVENOUS; SUBCUTANEOUS at 09:34

## 2018-09-22 RX ADMIN — MEROPENEM SCH MLS/HR: 1 INJECTION, POWDER, FOR SOLUTION INTRAVENOUS at 09:17

## 2018-09-22 RX ADMIN — IPRATROPIUM BROMIDE SCH MG: 0.5 SOLUTION RESPIRATORY (INHALATION) at 13:14

## 2018-09-22 RX ADMIN — ALBUTEROL SULFATE SCH MG: 2.5 SOLUTION RESPIRATORY (INHALATION) at 18:16

## 2018-09-22 RX ADMIN — MIDAZOLAM SCH MLS/HR: 5 INJECTION, SOLUTION INTRAMUSCULAR; INTRAVENOUS at 03:45

## 2018-09-22 RX ADMIN — FENTANYL CITRATE SCH MLS/HR: 50 INJECTION, SOLUTION INTRAMUSCULAR; INTRAVENOUS at 04:35

## 2018-09-22 RX ADMIN — DRONEDARONE SCH MG: 400 TABLET, FILM COATED ORAL at 09:34

## 2018-09-22 RX ADMIN — PANTOPRAZOLE SODIUM SCH MG: 40 INJECTION, POWDER, FOR SOLUTION INTRAVENOUS at 09:32

## 2018-09-23 VITALS — DIASTOLIC BLOOD PRESSURE: 66 MMHG | SYSTOLIC BLOOD PRESSURE: 112 MMHG

## 2018-09-23 VITALS — SYSTOLIC BLOOD PRESSURE: 113 MMHG | DIASTOLIC BLOOD PRESSURE: 55 MMHG

## 2018-09-23 VITALS — SYSTOLIC BLOOD PRESSURE: 117 MMHG | DIASTOLIC BLOOD PRESSURE: 75 MMHG

## 2018-09-23 VITALS — DIASTOLIC BLOOD PRESSURE: 51 MMHG | SYSTOLIC BLOOD PRESSURE: 95 MMHG

## 2018-09-23 VITALS — DIASTOLIC BLOOD PRESSURE: 54 MMHG | SYSTOLIC BLOOD PRESSURE: 93 MMHG

## 2018-09-23 VITALS — SYSTOLIC BLOOD PRESSURE: 105 MMHG | DIASTOLIC BLOOD PRESSURE: 66 MMHG

## 2018-09-23 VITALS — SYSTOLIC BLOOD PRESSURE: 101 MMHG | DIASTOLIC BLOOD PRESSURE: 60 MMHG

## 2018-09-23 VITALS — SYSTOLIC BLOOD PRESSURE: 110 MMHG | DIASTOLIC BLOOD PRESSURE: 61 MMHG

## 2018-09-23 VITALS — SYSTOLIC BLOOD PRESSURE: 120 MMHG | DIASTOLIC BLOOD PRESSURE: 71 MMHG

## 2018-09-23 VITALS — SYSTOLIC BLOOD PRESSURE: 108 MMHG | DIASTOLIC BLOOD PRESSURE: 63 MMHG

## 2018-09-23 VITALS — DIASTOLIC BLOOD PRESSURE: 55 MMHG | SYSTOLIC BLOOD PRESSURE: 92 MMHG

## 2018-09-23 VITALS — SYSTOLIC BLOOD PRESSURE: 96 MMHG | DIASTOLIC BLOOD PRESSURE: 58 MMHG

## 2018-09-23 VITALS — DIASTOLIC BLOOD PRESSURE: 77 MMHG | SYSTOLIC BLOOD PRESSURE: 107 MMHG

## 2018-09-23 VITALS — DIASTOLIC BLOOD PRESSURE: 54 MMHG | SYSTOLIC BLOOD PRESSURE: 92 MMHG

## 2018-09-23 VITALS — DIASTOLIC BLOOD PRESSURE: 62 MMHG | SYSTOLIC BLOOD PRESSURE: 115 MMHG

## 2018-09-23 VITALS — SYSTOLIC BLOOD PRESSURE: 104 MMHG | DIASTOLIC BLOOD PRESSURE: 74 MMHG

## 2018-09-23 VITALS — SYSTOLIC BLOOD PRESSURE: 122 MMHG | DIASTOLIC BLOOD PRESSURE: 70 MMHG

## 2018-09-23 VITALS — DIASTOLIC BLOOD PRESSURE: 61 MMHG | SYSTOLIC BLOOD PRESSURE: 122 MMHG

## 2018-09-23 VITALS — SYSTOLIC BLOOD PRESSURE: 114 MMHG | DIASTOLIC BLOOD PRESSURE: 71 MMHG

## 2018-09-23 VITALS — DIASTOLIC BLOOD PRESSURE: 63 MMHG | SYSTOLIC BLOOD PRESSURE: 103 MMHG

## 2018-09-23 VITALS — SYSTOLIC BLOOD PRESSURE: 119 MMHG | DIASTOLIC BLOOD PRESSURE: 51 MMHG

## 2018-09-23 VITALS — DIASTOLIC BLOOD PRESSURE: 47 MMHG | SYSTOLIC BLOOD PRESSURE: 103 MMHG

## 2018-09-23 VITALS — DIASTOLIC BLOOD PRESSURE: 59 MMHG | SYSTOLIC BLOOD PRESSURE: 99 MMHG

## 2018-09-23 VITALS — DIASTOLIC BLOOD PRESSURE: 52 MMHG | SYSTOLIC BLOOD PRESSURE: 87 MMHG

## 2018-09-23 VITALS — DIASTOLIC BLOOD PRESSURE: 58 MMHG | SYSTOLIC BLOOD PRESSURE: 82 MMHG

## 2018-09-23 VITALS — SYSTOLIC BLOOD PRESSURE: 104 MMHG | DIASTOLIC BLOOD PRESSURE: 61 MMHG

## 2018-09-23 VITALS — DIASTOLIC BLOOD PRESSURE: 57 MMHG | SYSTOLIC BLOOD PRESSURE: 103 MMHG

## 2018-09-23 VITALS — DIASTOLIC BLOOD PRESSURE: 76 MMHG | SYSTOLIC BLOOD PRESSURE: 120 MMHG

## 2018-09-23 VITALS — DIASTOLIC BLOOD PRESSURE: 58 MMHG | SYSTOLIC BLOOD PRESSURE: 93 MMHG

## 2018-09-23 VITALS — DIASTOLIC BLOOD PRESSURE: 54 MMHG | SYSTOLIC BLOOD PRESSURE: 101 MMHG

## 2018-09-23 VITALS — DIASTOLIC BLOOD PRESSURE: 64 MMHG | SYSTOLIC BLOOD PRESSURE: 111 MMHG

## 2018-09-23 VITALS — DIASTOLIC BLOOD PRESSURE: 55 MMHG | SYSTOLIC BLOOD PRESSURE: 104 MMHG

## 2018-09-23 VITALS — DIASTOLIC BLOOD PRESSURE: 77 MMHG | SYSTOLIC BLOOD PRESSURE: 124 MMHG

## 2018-09-23 VITALS — DIASTOLIC BLOOD PRESSURE: 59 MMHG | SYSTOLIC BLOOD PRESSURE: 95 MMHG

## 2018-09-23 VITALS — DIASTOLIC BLOOD PRESSURE: 57 MMHG | SYSTOLIC BLOOD PRESSURE: 105 MMHG

## 2018-09-23 VITALS — SYSTOLIC BLOOD PRESSURE: 98 MMHG | DIASTOLIC BLOOD PRESSURE: 47 MMHG

## 2018-09-23 VITALS — SYSTOLIC BLOOD PRESSURE: 122 MMHG | DIASTOLIC BLOOD PRESSURE: 52 MMHG

## 2018-09-23 VITALS — DIASTOLIC BLOOD PRESSURE: 50 MMHG | SYSTOLIC BLOOD PRESSURE: 106 MMHG

## 2018-09-23 VITALS — DIASTOLIC BLOOD PRESSURE: 69 MMHG | SYSTOLIC BLOOD PRESSURE: 133 MMHG

## 2018-09-23 VITALS — SYSTOLIC BLOOD PRESSURE: 111 MMHG | DIASTOLIC BLOOD PRESSURE: 60 MMHG

## 2018-09-23 VITALS — SYSTOLIC BLOOD PRESSURE: 103 MMHG | DIASTOLIC BLOOD PRESSURE: 54 MMHG

## 2018-09-23 VITALS — DIASTOLIC BLOOD PRESSURE: 56 MMHG | SYSTOLIC BLOOD PRESSURE: 101 MMHG

## 2018-09-23 VITALS — SYSTOLIC BLOOD PRESSURE: 96 MMHG | DIASTOLIC BLOOD PRESSURE: 57 MMHG

## 2018-09-23 VITALS — DIASTOLIC BLOOD PRESSURE: 62 MMHG | SYSTOLIC BLOOD PRESSURE: 100 MMHG

## 2018-09-23 VITALS — SYSTOLIC BLOOD PRESSURE: 117 MMHG | DIASTOLIC BLOOD PRESSURE: 60 MMHG

## 2018-09-23 VITALS — DIASTOLIC BLOOD PRESSURE: 65 MMHG | SYSTOLIC BLOOD PRESSURE: 115 MMHG

## 2018-09-23 VITALS — DIASTOLIC BLOOD PRESSURE: 62 MMHG | SYSTOLIC BLOOD PRESSURE: 110 MMHG

## 2018-09-23 VITALS — DIASTOLIC BLOOD PRESSURE: 68 MMHG | SYSTOLIC BLOOD PRESSURE: 112 MMHG

## 2018-09-23 VITALS — DIASTOLIC BLOOD PRESSURE: 61 MMHG | SYSTOLIC BLOOD PRESSURE: 115 MMHG

## 2018-09-23 VITALS — SYSTOLIC BLOOD PRESSURE: 97 MMHG | DIASTOLIC BLOOD PRESSURE: 50 MMHG

## 2018-09-23 VITALS — DIASTOLIC BLOOD PRESSURE: 61 MMHG | SYSTOLIC BLOOD PRESSURE: 95 MMHG

## 2018-09-23 VITALS — DIASTOLIC BLOOD PRESSURE: 69 MMHG | SYSTOLIC BLOOD PRESSURE: 117 MMHG

## 2018-09-23 VITALS — DIASTOLIC BLOOD PRESSURE: 50 MMHG | SYSTOLIC BLOOD PRESSURE: 113 MMHG

## 2018-09-23 VITALS — SYSTOLIC BLOOD PRESSURE: 111 MMHG | DIASTOLIC BLOOD PRESSURE: 68 MMHG

## 2018-09-23 VITALS — DIASTOLIC BLOOD PRESSURE: 65 MMHG | SYSTOLIC BLOOD PRESSURE: 116 MMHG

## 2018-09-23 VITALS — SYSTOLIC BLOOD PRESSURE: 105 MMHG | DIASTOLIC BLOOD PRESSURE: 64 MMHG

## 2018-09-23 VITALS — SYSTOLIC BLOOD PRESSURE: 110 MMHG | DIASTOLIC BLOOD PRESSURE: 57 MMHG

## 2018-09-23 VITALS — SYSTOLIC BLOOD PRESSURE: 84 MMHG | DIASTOLIC BLOOD PRESSURE: 54 MMHG

## 2018-09-23 VITALS — SYSTOLIC BLOOD PRESSURE: 112 MMHG | DIASTOLIC BLOOD PRESSURE: 70 MMHG

## 2018-09-23 VITALS — SYSTOLIC BLOOD PRESSURE: 96 MMHG | DIASTOLIC BLOOD PRESSURE: 50 MMHG

## 2018-09-23 VITALS — SYSTOLIC BLOOD PRESSURE: 114 MMHG | DIASTOLIC BLOOD PRESSURE: 63 MMHG

## 2018-09-23 VITALS — DIASTOLIC BLOOD PRESSURE: 59 MMHG | SYSTOLIC BLOOD PRESSURE: 109 MMHG

## 2018-09-23 VITALS — DIASTOLIC BLOOD PRESSURE: 64 MMHG | SYSTOLIC BLOOD PRESSURE: 105 MMHG

## 2018-09-23 VITALS — DIASTOLIC BLOOD PRESSURE: 63 MMHG | SYSTOLIC BLOOD PRESSURE: 98 MMHG

## 2018-09-23 VITALS — DIASTOLIC BLOOD PRESSURE: 58 MMHG | SYSTOLIC BLOOD PRESSURE: 96 MMHG

## 2018-09-23 VITALS — DIASTOLIC BLOOD PRESSURE: 58 MMHG | SYSTOLIC BLOOD PRESSURE: 110 MMHG

## 2018-09-23 VITALS — SYSTOLIC BLOOD PRESSURE: 122 MMHG | DIASTOLIC BLOOD PRESSURE: 73 MMHG

## 2018-09-23 VITALS — SYSTOLIC BLOOD PRESSURE: 100 MMHG | DIASTOLIC BLOOD PRESSURE: 53 MMHG

## 2018-09-23 VITALS — SYSTOLIC BLOOD PRESSURE: 107 MMHG | DIASTOLIC BLOOD PRESSURE: 71 MMHG

## 2018-09-23 VITALS — SYSTOLIC BLOOD PRESSURE: 111 MMHG | DIASTOLIC BLOOD PRESSURE: 65 MMHG

## 2018-09-23 VITALS — SYSTOLIC BLOOD PRESSURE: 96 MMHG | DIASTOLIC BLOOD PRESSURE: 56 MMHG

## 2018-09-23 VITALS — DIASTOLIC BLOOD PRESSURE: 68 MMHG | SYSTOLIC BLOOD PRESSURE: 113 MMHG

## 2018-09-23 VITALS — DIASTOLIC BLOOD PRESSURE: 66 MMHG | SYSTOLIC BLOOD PRESSURE: 94 MMHG

## 2018-09-23 VITALS — DIASTOLIC BLOOD PRESSURE: 56 MMHG | SYSTOLIC BLOOD PRESSURE: 97 MMHG

## 2018-09-23 VITALS — DIASTOLIC BLOOD PRESSURE: 59 MMHG | SYSTOLIC BLOOD PRESSURE: 89 MMHG

## 2018-09-23 VITALS — SYSTOLIC BLOOD PRESSURE: 107 MMHG | DIASTOLIC BLOOD PRESSURE: 68 MMHG

## 2018-09-23 VITALS — DIASTOLIC BLOOD PRESSURE: 60 MMHG | SYSTOLIC BLOOD PRESSURE: 104 MMHG

## 2018-09-23 VITALS — DIASTOLIC BLOOD PRESSURE: 70 MMHG | SYSTOLIC BLOOD PRESSURE: 127 MMHG

## 2018-09-23 VITALS — DIASTOLIC BLOOD PRESSURE: 54 MMHG | SYSTOLIC BLOOD PRESSURE: 98 MMHG

## 2018-09-23 VITALS — SYSTOLIC BLOOD PRESSURE: 118 MMHG | DIASTOLIC BLOOD PRESSURE: 62 MMHG

## 2018-09-23 VITALS — SYSTOLIC BLOOD PRESSURE: 94 MMHG | DIASTOLIC BLOOD PRESSURE: 56 MMHG

## 2018-09-23 VITALS — SYSTOLIC BLOOD PRESSURE: 116 MMHG | DIASTOLIC BLOOD PRESSURE: 64 MMHG

## 2018-09-23 VITALS — DIASTOLIC BLOOD PRESSURE: 71 MMHG | SYSTOLIC BLOOD PRESSURE: 105 MMHG

## 2018-09-23 VITALS — SYSTOLIC BLOOD PRESSURE: 110 MMHG | DIASTOLIC BLOOD PRESSURE: 68 MMHG

## 2018-09-23 VITALS — SYSTOLIC BLOOD PRESSURE: 95 MMHG | DIASTOLIC BLOOD PRESSURE: 55 MMHG

## 2018-09-23 VITALS — DIASTOLIC BLOOD PRESSURE: 69 MMHG | SYSTOLIC BLOOD PRESSURE: 113 MMHG

## 2018-09-23 VITALS — DIASTOLIC BLOOD PRESSURE: 52 MMHG | SYSTOLIC BLOOD PRESSURE: 97 MMHG

## 2018-09-23 VITALS — DIASTOLIC BLOOD PRESSURE: 69 MMHG | SYSTOLIC BLOOD PRESSURE: 109 MMHG

## 2018-09-23 VITALS — SYSTOLIC BLOOD PRESSURE: 112 MMHG | DIASTOLIC BLOOD PRESSURE: 68 MMHG

## 2018-09-23 VITALS — SYSTOLIC BLOOD PRESSURE: 94 MMHG | DIASTOLIC BLOOD PRESSURE: 48 MMHG

## 2018-09-23 VITALS — DIASTOLIC BLOOD PRESSURE: 58 MMHG | SYSTOLIC BLOOD PRESSURE: 105 MMHG

## 2018-09-23 VITALS — SYSTOLIC BLOOD PRESSURE: 91 MMHG | DIASTOLIC BLOOD PRESSURE: 53 MMHG

## 2018-09-23 VITALS — DIASTOLIC BLOOD PRESSURE: 55 MMHG | SYSTOLIC BLOOD PRESSURE: 103 MMHG

## 2018-09-23 VITALS — DIASTOLIC BLOOD PRESSURE: 62 MMHG | SYSTOLIC BLOOD PRESSURE: 97 MMHG

## 2018-09-23 VITALS — DIASTOLIC BLOOD PRESSURE: 64 MMHG | SYSTOLIC BLOOD PRESSURE: 118 MMHG

## 2018-09-23 VITALS — DIASTOLIC BLOOD PRESSURE: 57 MMHG | SYSTOLIC BLOOD PRESSURE: 98 MMHG

## 2018-09-23 VITALS — DIASTOLIC BLOOD PRESSURE: 60 MMHG | SYSTOLIC BLOOD PRESSURE: 102 MMHG

## 2018-09-23 VITALS — SYSTOLIC BLOOD PRESSURE: 94 MMHG | DIASTOLIC BLOOD PRESSURE: 52 MMHG

## 2018-09-23 RX ADMIN — ALBUTEROL SULFATE SCH MG: 2.5 SOLUTION RESPIRATORY (INHALATION) at 11:14

## 2018-09-23 RX ADMIN — MEROPENEM SCH MLS/HR: 1 INJECTION, POWDER, FOR SOLUTION INTRAVENOUS at 08:46

## 2018-09-23 RX ADMIN — MIDAZOLAM SCH MLS/HR: 5 INJECTION, SOLUTION INTRAMUSCULAR; INTRAVENOUS at 19:30

## 2018-09-23 RX ADMIN — MEROPENEM SCH MLS/HR: 1 INJECTION, POWDER, FOR SOLUTION INTRAVENOUS at 01:10

## 2018-09-23 RX ADMIN — ENOXAPARIN SODIUM SCH MG: 40 INJECTION SUBCUTANEOUS at 10:02

## 2018-09-23 RX ADMIN — FENTANYL CITRATE SCH MLS/HR: 50 INJECTION, SOLUTION INTRAMUSCULAR; INTRAVENOUS at 10:02

## 2018-09-23 RX ADMIN — BUDESONIDE SCH MG: 0.5 SUSPENSION RESPIRATORY (INHALATION) at 18:37

## 2018-09-23 RX ADMIN — FUROSEMIDE SCH MG: 10 INJECTION, SOLUTION INTRAMUSCULAR; INTRAVENOUS at 17:49

## 2018-09-23 RX ADMIN — PANTOPRAZOLE SODIUM SCH MG: 40 INJECTION, POWDER, FOR SOLUTION INTRAVENOUS at 10:01

## 2018-09-23 RX ADMIN — NOREPINEPHRINE BITARTRATE SCH MLS/HR: 1 INJECTION, SOLUTION, CONCENTRATE INTRAVENOUS at 03:50

## 2018-09-23 RX ADMIN — ALBUTEROL SULFATE SCH MG: 2.5 SOLUTION RESPIRATORY (INHALATION) at 06:45

## 2018-09-23 RX ADMIN — IPRATROPIUM BROMIDE SCH MG: 0.5 SOLUTION RESPIRATORY (INHALATION) at 11:14

## 2018-09-23 RX ADMIN — BUDESONIDE SCH MG: 0.5 SUSPENSION RESPIRATORY (INHALATION) at 06:46

## 2018-09-23 RX ADMIN — SODIUM CHLORIDE SCH ML: 9 INJECTION INTRAMUSCULAR; INTRAVENOUS; SUBCUTANEOUS at 10:03

## 2018-09-23 RX ADMIN — IPRATROPIUM BROMIDE SCH MG: 0.5 SOLUTION RESPIRATORY (INHALATION) at 06:46

## 2018-09-23 RX ADMIN — DRONEDARONE SCH MG: 400 TABLET, FILM COATED ORAL at 22:07

## 2018-09-23 RX ADMIN — SODIUM CHLORIDE SCH ML: 9 INJECTION INTRAMUSCULAR; INTRAVENOUS; SUBCUTANEOUS at 22:07

## 2018-09-23 RX ADMIN — NOREPINEPHRINE BITARTRATE SCH MLS/HR: 1 INJECTION, SOLUTION, CONCENTRATE INTRAVENOUS at 23:05

## 2018-09-23 RX ADMIN — IPRATROPIUM BROMIDE SCH MG: 0.5 SOLUTION RESPIRATORY (INHALATION) at 00:20

## 2018-09-23 RX ADMIN — RANOLAZINE SCH MG: 500 TABLET, FILM COATED, EXTENDED RELEASE ORAL at 22:00

## 2018-09-23 RX ADMIN — ALBUTEROL SULFATE SCH MG: 2.5 SOLUTION RESPIRATORY (INHALATION) at 18:36

## 2018-09-23 RX ADMIN — ALBUTEROL SULFATE SCH MG: 2.5 SOLUTION RESPIRATORY (INHALATION) at 00:20

## 2018-09-23 RX ADMIN — IPRATROPIUM BROMIDE SCH MG: 0.5 SOLUTION RESPIRATORY (INHALATION) at 18:37

## 2018-09-23 RX ADMIN — MEROPENEM SCH MLS/HR: 1 INJECTION, POWDER, FOR SOLUTION INTRAVENOUS at 16:53

## 2018-09-23 RX ADMIN — MIDAZOLAM SCH MLS/HR: 5 INJECTION, SOLUTION INTRAMUSCULAR; INTRAVENOUS at 05:35

## 2018-09-23 RX ADMIN — RANOLAZINE SCH MG: 500 TABLET, FILM COATED, EXTENDED RELEASE ORAL at 10:00

## 2018-09-23 RX ADMIN — FUROSEMIDE SCH MG: 10 INJECTION, SOLUTION INTRAMUSCULAR; INTRAVENOUS at 10:03

## 2018-09-23 RX ADMIN — MIDAZOLAM SCH MLS/HR: 5 INJECTION, SOLUTION INTRAMUSCULAR; INTRAVENOUS at 11:08

## 2018-09-23 RX ADMIN — FENTANYL CITRATE SCH MLS/HR: 50 INJECTION, SOLUTION INTRAMUSCULAR; INTRAVENOUS at 21:46

## 2018-09-23 RX ADMIN — MIDAZOLAM SCH MLS/HR: 5 INJECTION, SOLUTION INTRAMUSCULAR; INTRAVENOUS at 14:45

## 2018-09-23 RX ADMIN — DOPAMINE HYDROCHLORIDE IN DEXTROSE SCH MLS/HR: 1.6 INJECTION, SOLUTION INTRAVENOUS at 10:54

## 2018-09-23 RX ADMIN — APIXABAN SCH MG: 5 TABLET, FILM COATED ORAL at 22:07

## 2018-09-23 RX ADMIN — DRONEDARONE SCH MG: 400 TABLET, FILM COATED ORAL at 10:03

## 2018-09-24 VITALS — SYSTOLIC BLOOD PRESSURE: 94 MMHG | DIASTOLIC BLOOD PRESSURE: 57 MMHG

## 2018-09-24 VITALS — DIASTOLIC BLOOD PRESSURE: 58 MMHG | SYSTOLIC BLOOD PRESSURE: 99 MMHG

## 2018-09-24 VITALS — SYSTOLIC BLOOD PRESSURE: 112 MMHG | DIASTOLIC BLOOD PRESSURE: 78 MMHG

## 2018-09-24 VITALS — SYSTOLIC BLOOD PRESSURE: 107 MMHG | DIASTOLIC BLOOD PRESSURE: 37 MMHG

## 2018-09-24 VITALS — SYSTOLIC BLOOD PRESSURE: 103 MMHG | DIASTOLIC BLOOD PRESSURE: 59 MMHG

## 2018-09-24 VITALS — DIASTOLIC BLOOD PRESSURE: 66 MMHG | SYSTOLIC BLOOD PRESSURE: 112 MMHG

## 2018-09-24 VITALS — SYSTOLIC BLOOD PRESSURE: 92 MMHG | DIASTOLIC BLOOD PRESSURE: 67 MMHG

## 2018-09-24 VITALS — SYSTOLIC BLOOD PRESSURE: 109 MMHG | DIASTOLIC BLOOD PRESSURE: 58 MMHG

## 2018-09-24 VITALS — DIASTOLIC BLOOD PRESSURE: 63 MMHG | SYSTOLIC BLOOD PRESSURE: 119 MMHG

## 2018-09-24 VITALS — SYSTOLIC BLOOD PRESSURE: 104 MMHG | DIASTOLIC BLOOD PRESSURE: 58 MMHG

## 2018-09-24 VITALS — DIASTOLIC BLOOD PRESSURE: 57 MMHG | SYSTOLIC BLOOD PRESSURE: 99 MMHG

## 2018-09-24 VITALS — SYSTOLIC BLOOD PRESSURE: 100 MMHG | DIASTOLIC BLOOD PRESSURE: 66 MMHG

## 2018-09-24 VITALS — SYSTOLIC BLOOD PRESSURE: 97 MMHG | DIASTOLIC BLOOD PRESSURE: 57 MMHG

## 2018-09-24 VITALS — SYSTOLIC BLOOD PRESSURE: 138 MMHG | DIASTOLIC BLOOD PRESSURE: 71 MMHG

## 2018-09-24 VITALS — DIASTOLIC BLOOD PRESSURE: 66 MMHG | SYSTOLIC BLOOD PRESSURE: 107 MMHG

## 2018-09-24 VITALS — DIASTOLIC BLOOD PRESSURE: 71 MMHG | SYSTOLIC BLOOD PRESSURE: 105 MMHG

## 2018-09-24 VITALS — DIASTOLIC BLOOD PRESSURE: 68 MMHG | SYSTOLIC BLOOD PRESSURE: 124 MMHG

## 2018-09-24 VITALS — SYSTOLIC BLOOD PRESSURE: 93 MMHG | DIASTOLIC BLOOD PRESSURE: 56 MMHG

## 2018-09-24 VITALS — SYSTOLIC BLOOD PRESSURE: 121 MMHG | DIASTOLIC BLOOD PRESSURE: 72 MMHG

## 2018-09-24 VITALS — SYSTOLIC BLOOD PRESSURE: 102 MMHG | DIASTOLIC BLOOD PRESSURE: 63 MMHG

## 2018-09-24 VITALS — DIASTOLIC BLOOD PRESSURE: 61 MMHG | SYSTOLIC BLOOD PRESSURE: 101 MMHG

## 2018-09-24 VITALS — DIASTOLIC BLOOD PRESSURE: 59 MMHG | SYSTOLIC BLOOD PRESSURE: 100 MMHG

## 2018-09-24 VITALS — SYSTOLIC BLOOD PRESSURE: 109 MMHG | DIASTOLIC BLOOD PRESSURE: 69 MMHG

## 2018-09-24 VITALS — SYSTOLIC BLOOD PRESSURE: 111 MMHG | DIASTOLIC BLOOD PRESSURE: 67 MMHG

## 2018-09-24 VITALS — SYSTOLIC BLOOD PRESSURE: 87 MMHG | DIASTOLIC BLOOD PRESSURE: 49 MMHG

## 2018-09-24 VITALS — SYSTOLIC BLOOD PRESSURE: 108 MMHG | DIASTOLIC BLOOD PRESSURE: 68 MMHG

## 2018-09-24 VITALS — SYSTOLIC BLOOD PRESSURE: 100 MMHG | DIASTOLIC BLOOD PRESSURE: 56 MMHG

## 2018-09-24 VITALS — DIASTOLIC BLOOD PRESSURE: 71 MMHG | SYSTOLIC BLOOD PRESSURE: 121 MMHG

## 2018-09-24 VITALS — DIASTOLIC BLOOD PRESSURE: 59 MMHG | SYSTOLIC BLOOD PRESSURE: 97 MMHG

## 2018-09-24 VITALS — SYSTOLIC BLOOD PRESSURE: 122 MMHG | DIASTOLIC BLOOD PRESSURE: 72 MMHG

## 2018-09-24 VITALS — DIASTOLIC BLOOD PRESSURE: 67 MMHG | SYSTOLIC BLOOD PRESSURE: 129 MMHG

## 2018-09-24 VITALS — DIASTOLIC BLOOD PRESSURE: 68 MMHG | SYSTOLIC BLOOD PRESSURE: 113 MMHG

## 2018-09-24 VITALS — SYSTOLIC BLOOD PRESSURE: 109 MMHG | DIASTOLIC BLOOD PRESSURE: 51 MMHG

## 2018-09-24 VITALS — DIASTOLIC BLOOD PRESSURE: 69 MMHG | SYSTOLIC BLOOD PRESSURE: 115 MMHG

## 2018-09-24 VITALS — SYSTOLIC BLOOD PRESSURE: 100 MMHG | DIASTOLIC BLOOD PRESSURE: 53 MMHG

## 2018-09-24 VITALS — DIASTOLIC BLOOD PRESSURE: 68 MMHG | SYSTOLIC BLOOD PRESSURE: 118 MMHG

## 2018-09-24 VITALS — SYSTOLIC BLOOD PRESSURE: 106 MMHG | DIASTOLIC BLOOD PRESSURE: 77 MMHG

## 2018-09-24 VITALS — SYSTOLIC BLOOD PRESSURE: 102 MMHG | DIASTOLIC BLOOD PRESSURE: 66 MMHG

## 2018-09-24 VITALS — DIASTOLIC BLOOD PRESSURE: 65 MMHG | SYSTOLIC BLOOD PRESSURE: 95 MMHG

## 2018-09-24 VITALS — SYSTOLIC BLOOD PRESSURE: 112 MMHG | DIASTOLIC BLOOD PRESSURE: 61 MMHG

## 2018-09-24 VITALS — SYSTOLIC BLOOD PRESSURE: 104 MMHG | DIASTOLIC BLOOD PRESSURE: 60 MMHG

## 2018-09-24 VITALS — DIASTOLIC BLOOD PRESSURE: 58 MMHG | SYSTOLIC BLOOD PRESSURE: 97 MMHG

## 2018-09-24 VITALS — DIASTOLIC BLOOD PRESSURE: 70 MMHG | SYSTOLIC BLOOD PRESSURE: 117 MMHG

## 2018-09-24 VITALS — DIASTOLIC BLOOD PRESSURE: 61 MMHG | SYSTOLIC BLOOD PRESSURE: 104 MMHG

## 2018-09-24 VITALS — SYSTOLIC BLOOD PRESSURE: 110 MMHG | DIASTOLIC BLOOD PRESSURE: 66 MMHG

## 2018-09-24 VITALS — SYSTOLIC BLOOD PRESSURE: 113 MMHG | DIASTOLIC BLOOD PRESSURE: 83 MMHG

## 2018-09-24 VITALS — SYSTOLIC BLOOD PRESSURE: 116 MMHG | DIASTOLIC BLOOD PRESSURE: 55 MMHG

## 2018-09-24 VITALS — SYSTOLIC BLOOD PRESSURE: 89 MMHG | DIASTOLIC BLOOD PRESSURE: 55 MMHG

## 2018-09-24 VITALS — SYSTOLIC BLOOD PRESSURE: 125 MMHG | DIASTOLIC BLOOD PRESSURE: 67 MMHG

## 2018-09-24 VITALS — DIASTOLIC BLOOD PRESSURE: 51 MMHG | SYSTOLIC BLOOD PRESSURE: 101 MMHG

## 2018-09-24 VITALS — SYSTOLIC BLOOD PRESSURE: 104 MMHG | DIASTOLIC BLOOD PRESSURE: 63 MMHG

## 2018-09-24 VITALS — SYSTOLIC BLOOD PRESSURE: 109 MMHG | DIASTOLIC BLOOD PRESSURE: 55 MMHG

## 2018-09-24 VITALS — DIASTOLIC BLOOD PRESSURE: 51 MMHG | SYSTOLIC BLOOD PRESSURE: 109 MMHG

## 2018-09-24 VITALS — DIASTOLIC BLOOD PRESSURE: 48 MMHG | SYSTOLIC BLOOD PRESSURE: 100 MMHG

## 2018-09-24 VITALS — DIASTOLIC BLOOD PRESSURE: 71 MMHG | SYSTOLIC BLOOD PRESSURE: 115 MMHG

## 2018-09-24 VITALS — SYSTOLIC BLOOD PRESSURE: 111 MMHG | DIASTOLIC BLOOD PRESSURE: 59 MMHG

## 2018-09-24 VITALS — DIASTOLIC BLOOD PRESSURE: 46 MMHG | SYSTOLIC BLOOD PRESSURE: 92 MMHG

## 2018-09-24 VITALS — SYSTOLIC BLOOD PRESSURE: 110 MMHG | DIASTOLIC BLOOD PRESSURE: 62 MMHG

## 2018-09-24 VITALS — SYSTOLIC BLOOD PRESSURE: 120 MMHG | DIASTOLIC BLOOD PRESSURE: 81 MMHG

## 2018-09-24 VITALS — DIASTOLIC BLOOD PRESSURE: 73 MMHG | SYSTOLIC BLOOD PRESSURE: 124 MMHG

## 2018-09-24 VITALS — DIASTOLIC BLOOD PRESSURE: 60 MMHG | SYSTOLIC BLOOD PRESSURE: 97 MMHG

## 2018-09-24 VITALS — DIASTOLIC BLOOD PRESSURE: 66 MMHG | SYSTOLIC BLOOD PRESSURE: 105 MMHG

## 2018-09-24 VITALS — DIASTOLIC BLOOD PRESSURE: 73 MMHG | SYSTOLIC BLOOD PRESSURE: 128 MMHG

## 2018-09-24 VITALS — DIASTOLIC BLOOD PRESSURE: 68 MMHG | SYSTOLIC BLOOD PRESSURE: 107 MMHG

## 2018-09-24 VITALS — SYSTOLIC BLOOD PRESSURE: 103 MMHG | DIASTOLIC BLOOD PRESSURE: 54 MMHG

## 2018-09-24 VITALS — SYSTOLIC BLOOD PRESSURE: 105 MMHG | DIASTOLIC BLOOD PRESSURE: 57 MMHG

## 2018-09-24 VITALS — DIASTOLIC BLOOD PRESSURE: 77 MMHG | SYSTOLIC BLOOD PRESSURE: 106 MMHG

## 2018-09-24 VITALS — SYSTOLIC BLOOD PRESSURE: 115 MMHG | DIASTOLIC BLOOD PRESSURE: 74 MMHG

## 2018-09-24 VITALS — SYSTOLIC BLOOD PRESSURE: 104 MMHG | DIASTOLIC BLOOD PRESSURE: 71 MMHG

## 2018-09-24 VITALS — SYSTOLIC BLOOD PRESSURE: 113 MMHG | DIASTOLIC BLOOD PRESSURE: 68 MMHG

## 2018-09-24 VITALS — DIASTOLIC BLOOD PRESSURE: 67 MMHG | SYSTOLIC BLOOD PRESSURE: 111 MMHG

## 2018-09-24 VITALS — SYSTOLIC BLOOD PRESSURE: 114 MMHG | DIASTOLIC BLOOD PRESSURE: 67 MMHG

## 2018-09-24 VITALS — SYSTOLIC BLOOD PRESSURE: 95 MMHG | DIASTOLIC BLOOD PRESSURE: 67 MMHG

## 2018-09-24 VITALS — DIASTOLIC BLOOD PRESSURE: 63 MMHG | SYSTOLIC BLOOD PRESSURE: 105 MMHG

## 2018-09-24 VITALS — DIASTOLIC BLOOD PRESSURE: 72 MMHG | SYSTOLIC BLOOD PRESSURE: 114 MMHG

## 2018-09-24 VITALS — DIASTOLIC BLOOD PRESSURE: 69 MMHG | SYSTOLIC BLOOD PRESSURE: 117 MMHG

## 2018-09-24 VITALS — SYSTOLIC BLOOD PRESSURE: 106 MMHG | DIASTOLIC BLOOD PRESSURE: 53 MMHG

## 2018-09-24 VITALS — SYSTOLIC BLOOD PRESSURE: 128 MMHG | DIASTOLIC BLOOD PRESSURE: 70 MMHG

## 2018-09-24 VITALS — DIASTOLIC BLOOD PRESSURE: 63 MMHG | SYSTOLIC BLOOD PRESSURE: 102 MMHG

## 2018-09-24 VITALS — DIASTOLIC BLOOD PRESSURE: 77 MMHG | SYSTOLIC BLOOD PRESSURE: 124 MMHG

## 2018-09-24 VITALS — SYSTOLIC BLOOD PRESSURE: 107 MMHG | DIASTOLIC BLOOD PRESSURE: 50 MMHG

## 2018-09-24 VITALS — SYSTOLIC BLOOD PRESSURE: 95 MMHG | DIASTOLIC BLOOD PRESSURE: 58 MMHG

## 2018-09-24 VITALS — SYSTOLIC BLOOD PRESSURE: 112 MMHG | DIASTOLIC BLOOD PRESSURE: 72 MMHG

## 2018-09-24 VITALS — DIASTOLIC BLOOD PRESSURE: 56 MMHG | SYSTOLIC BLOOD PRESSURE: 96 MMHG

## 2018-09-24 VITALS — SYSTOLIC BLOOD PRESSURE: 111 MMHG | DIASTOLIC BLOOD PRESSURE: 71 MMHG

## 2018-09-24 VITALS — DIASTOLIC BLOOD PRESSURE: 60 MMHG | SYSTOLIC BLOOD PRESSURE: 104 MMHG

## 2018-09-24 VITALS — DIASTOLIC BLOOD PRESSURE: 71 MMHG | SYSTOLIC BLOOD PRESSURE: 107 MMHG

## 2018-09-24 VITALS — SYSTOLIC BLOOD PRESSURE: 101 MMHG | DIASTOLIC BLOOD PRESSURE: 67 MMHG

## 2018-09-24 VITALS — SYSTOLIC BLOOD PRESSURE: 113 MMHG | DIASTOLIC BLOOD PRESSURE: 66 MMHG

## 2018-09-24 VITALS — SYSTOLIC BLOOD PRESSURE: 111 MMHG | DIASTOLIC BLOOD PRESSURE: 46 MMHG

## 2018-09-24 VITALS — SYSTOLIC BLOOD PRESSURE: 102 MMHG | DIASTOLIC BLOOD PRESSURE: 59 MMHG

## 2018-09-24 VITALS — SYSTOLIC BLOOD PRESSURE: 113 MMHG | DIASTOLIC BLOOD PRESSURE: 57 MMHG

## 2018-09-24 VITALS — SYSTOLIC BLOOD PRESSURE: 121 MMHG | DIASTOLIC BLOOD PRESSURE: 71 MMHG

## 2018-09-24 VITALS — SYSTOLIC BLOOD PRESSURE: 107 MMHG | DIASTOLIC BLOOD PRESSURE: 65 MMHG

## 2018-09-24 LAB
HCT VFR BLD AUTO: 34.9 % (ref 36–46)
HGB BLD-MCNC: 11.2 G/DL (ref 12.2–16.2)
MCH RBC QN AUTO: 26.1 PG (ref 28–32)
MCV RBC AUTO: 81.2 FL (ref 80–100)
NRBC BLD QL AUTO: 0.1 %

## 2018-09-24 RX ADMIN — APIXABAN SCH MG: 5 TABLET, FILM COATED ORAL at 22:06

## 2018-09-24 RX ADMIN — ALBUTEROL SULFATE SCH MG: 2.5 SOLUTION RESPIRATORY (INHALATION) at 12:08

## 2018-09-24 RX ADMIN — MIDAZOLAM SCH MLS/HR: 5 INJECTION, SOLUTION INTRAMUSCULAR; INTRAVENOUS at 23:44

## 2018-09-24 RX ADMIN — IPRATROPIUM BROMIDE SCH MG: 0.5 SOLUTION RESPIRATORY (INHALATION) at 12:08

## 2018-09-24 RX ADMIN — RANOLAZINE SCH MG: 500 TABLET, FILM COATED, EXTENDED RELEASE ORAL at 22:00

## 2018-09-24 RX ADMIN — DRONEDARONE SCH MG: 400 TABLET, FILM COATED ORAL at 09:57

## 2018-09-24 RX ADMIN — MEROPENEM SCH MLS/HR: 1 INJECTION, POWDER, FOR SOLUTION INTRAVENOUS at 17:39

## 2018-09-24 RX ADMIN — MEROPENEM SCH MLS/HR: 1 INJECTION, POWDER, FOR SOLUTION INTRAVENOUS at 01:03

## 2018-09-24 RX ADMIN — NOREPINEPHRINE BITARTRATE SCH MLS/HR: 1 INJECTION, SOLUTION, CONCENTRATE INTRAVENOUS at 23:05

## 2018-09-24 RX ADMIN — MIDAZOLAM SCH MLS/HR: 5 INJECTION, SOLUTION INTRAMUSCULAR; INTRAVENOUS at 16:25

## 2018-09-24 RX ADMIN — IPRATROPIUM BROMIDE SCH MG: 0.5 SOLUTION RESPIRATORY (INHALATION) at 18:27

## 2018-09-24 RX ADMIN — PANTOPRAZOLE SODIUM SCH MG: 40 INJECTION, POWDER, FOR SOLUTION INTRAVENOUS at 09:57

## 2018-09-24 RX ADMIN — BUDESONIDE SCH MG: 0.5 SUSPENSION RESPIRATORY (INHALATION) at 05:39

## 2018-09-24 RX ADMIN — DRONEDARONE SCH MG: 400 TABLET, FILM COATED ORAL at 22:05

## 2018-09-24 RX ADMIN — SACUBITRIL AND VALSARTAN SCH TAB: 24; 26 TABLET, FILM COATED ORAL at 22:00

## 2018-09-24 RX ADMIN — DOPAMINE HYDROCHLORIDE IN DEXTROSE SCH MLS/HR: 1.6 INJECTION, SOLUTION INTRAVENOUS at 03:43

## 2018-09-24 RX ADMIN — APIXABAN SCH MG: 5 TABLET, FILM COATED ORAL at 09:58

## 2018-09-24 RX ADMIN — SPIRONOLACTONE SCH MG: 25 TABLET, FILM COATED ORAL at 17:17

## 2018-09-24 RX ADMIN — FENTANYL CITRATE SCH MLS/HR: 50 INJECTION, SOLUTION INTRAMUSCULAR; INTRAVENOUS at 09:58

## 2018-09-24 RX ADMIN — MIDAZOLAM SCH MLS/HR: 5 INJECTION, SOLUTION INTRAMUSCULAR; INTRAVENOUS at 00:27

## 2018-09-24 RX ADMIN — BUDESONIDE SCH MG: 0.5 SUSPENSION RESPIRATORY (INHALATION) at 18:27

## 2018-09-24 RX ADMIN — ALBUTEROL SULFATE SCH MG: 2.5 SOLUTION RESPIRATORY (INHALATION) at 05:38

## 2018-09-24 RX ADMIN — IPRATROPIUM BROMIDE SCH MG: 0.5 SOLUTION RESPIRATORY (INHALATION) at 05:38

## 2018-09-24 RX ADMIN — MIDAZOLAM SCH MLS/HR: 5 INJECTION, SOLUTION INTRAMUSCULAR; INTRAVENOUS at 01:40

## 2018-09-24 RX ADMIN — RANOLAZINE SCH MG: 500 TABLET, FILM COATED, EXTENDED RELEASE ORAL at 09:59

## 2018-09-24 RX ADMIN — SODIUM CHLORIDE SCH ML: 9 INJECTION INTRAMUSCULAR; INTRAVENOUS; SUBCUTANEOUS at 09:59

## 2018-09-24 RX ADMIN — ALBUTEROL SULFATE SCH MG: 2.5 SOLUTION RESPIRATORY (INHALATION) at 00:22

## 2018-09-24 RX ADMIN — FUROSEMIDE SCH MG: 10 INJECTION, SOLUTION INTRAMUSCULAR; INTRAVENOUS at 17:17

## 2018-09-24 RX ADMIN — MEROPENEM SCH MLS/HR: 1 INJECTION, POWDER, FOR SOLUTION INTRAVENOUS at 09:18

## 2018-09-24 RX ADMIN — MIDAZOLAM SCH MLS/HR: 5 INJECTION, SOLUTION INTRAMUSCULAR; INTRAVENOUS at 09:58

## 2018-09-24 RX ADMIN — IPRATROPIUM BROMIDE SCH MG: 0.5 SOLUTION RESPIRATORY (INHALATION) at 00:22

## 2018-09-24 RX ADMIN — FUROSEMIDE SCH MG: 10 INJECTION, SOLUTION INTRAMUSCULAR; INTRAVENOUS at 06:52

## 2018-09-24 RX ADMIN — SODIUM CHLORIDE SCH ML: 9 INJECTION INTRAMUSCULAR; INTRAVENOUS; SUBCUTANEOUS at 22:06

## 2018-09-24 RX ADMIN — ALBUTEROL SULFATE SCH MG: 2.5 SOLUTION RESPIRATORY (INHALATION) at 18:27

## 2018-09-25 VITALS — DIASTOLIC BLOOD PRESSURE: 49 MMHG | SYSTOLIC BLOOD PRESSURE: 108 MMHG

## 2018-09-25 VITALS — SYSTOLIC BLOOD PRESSURE: 65 MMHG | DIASTOLIC BLOOD PRESSURE: 30 MMHG

## 2018-09-25 VITALS — SYSTOLIC BLOOD PRESSURE: 107 MMHG | DIASTOLIC BLOOD PRESSURE: 59 MMHG

## 2018-09-25 VITALS — SYSTOLIC BLOOD PRESSURE: 114 MMHG | DIASTOLIC BLOOD PRESSURE: 76 MMHG

## 2018-09-25 VITALS — DIASTOLIC BLOOD PRESSURE: 59 MMHG | SYSTOLIC BLOOD PRESSURE: 106 MMHG

## 2018-09-25 VITALS — SYSTOLIC BLOOD PRESSURE: 102 MMHG | DIASTOLIC BLOOD PRESSURE: 64 MMHG

## 2018-09-25 VITALS — DIASTOLIC BLOOD PRESSURE: 69 MMHG | SYSTOLIC BLOOD PRESSURE: 100 MMHG

## 2018-09-25 VITALS — DIASTOLIC BLOOD PRESSURE: 65 MMHG | SYSTOLIC BLOOD PRESSURE: 124 MMHG

## 2018-09-25 VITALS — DIASTOLIC BLOOD PRESSURE: 75 MMHG | SYSTOLIC BLOOD PRESSURE: 124 MMHG

## 2018-09-25 VITALS — SYSTOLIC BLOOD PRESSURE: 146 MMHG | DIASTOLIC BLOOD PRESSURE: 85 MMHG

## 2018-09-25 VITALS — DIASTOLIC BLOOD PRESSURE: 78 MMHG | SYSTOLIC BLOOD PRESSURE: 123 MMHG

## 2018-09-25 VITALS — SYSTOLIC BLOOD PRESSURE: 113 MMHG | DIASTOLIC BLOOD PRESSURE: 62 MMHG

## 2018-09-25 VITALS — SYSTOLIC BLOOD PRESSURE: 115 MMHG | DIASTOLIC BLOOD PRESSURE: 78 MMHG

## 2018-09-25 VITALS — DIASTOLIC BLOOD PRESSURE: 50 MMHG | SYSTOLIC BLOOD PRESSURE: 100 MMHG

## 2018-09-25 VITALS — DIASTOLIC BLOOD PRESSURE: 41 MMHG | SYSTOLIC BLOOD PRESSURE: 88 MMHG

## 2018-09-25 VITALS — SYSTOLIC BLOOD PRESSURE: 105 MMHG | DIASTOLIC BLOOD PRESSURE: 63 MMHG

## 2018-09-25 VITALS — DIASTOLIC BLOOD PRESSURE: 57 MMHG | SYSTOLIC BLOOD PRESSURE: 97 MMHG

## 2018-09-25 VITALS — DIASTOLIC BLOOD PRESSURE: 63 MMHG | SYSTOLIC BLOOD PRESSURE: 108 MMHG

## 2018-09-25 VITALS — SYSTOLIC BLOOD PRESSURE: 122 MMHG | DIASTOLIC BLOOD PRESSURE: 70 MMHG

## 2018-09-25 VITALS — DIASTOLIC BLOOD PRESSURE: 77 MMHG | SYSTOLIC BLOOD PRESSURE: 128 MMHG

## 2018-09-25 VITALS — SYSTOLIC BLOOD PRESSURE: 115 MMHG | DIASTOLIC BLOOD PRESSURE: 75 MMHG

## 2018-09-25 VITALS — DIASTOLIC BLOOD PRESSURE: 64 MMHG | SYSTOLIC BLOOD PRESSURE: 119 MMHG

## 2018-09-25 VITALS — SYSTOLIC BLOOD PRESSURE: 111 MMHG | DIASTOLIC BLOOD PRESSURE: 67 MMHG

## 2018-09-25 VITALS — DIASTOLIC BLOOD PRESSURE: 77 MMHG | SYSTOLIC BLOOD PRESSURE: 119 MMHG

## 2018-09-25 VITALS — DIASTOLIC BLOOD PRESSURE: 85 MMHG | SYSTOLIC BLOOD PRESSURE: 124 MMHG

## 2018-09-25 VITALS — DIASTOLIC BLOOD PRESSURE: 78 MMHG | SYSTOLIC BLOOD PRESSURE: 145 MMHG

## 2018-09-25 VITALS — SYSTOLIC BLOOD PRESSURE: 106 MMHG | DIASTOLIC BLOOD PRESSURE: 71 MMHG

## 2018-09-25 VITALS — DIASTOLIC BLOOD PRESSURE: 79 MMHG | SYSTOLIC BLOOD PRESSURE: 120 MMHG

## 2018-09-25 VITALS — DIASTOLIC BLOOD PRESSURE: 61 MMHG | SYSTOLIC BLOOD PRESSURE: 106 MMHG

## 2018-09-25 VITALS — SYSTOLIC BLOOD PRESSURE: 102 MMHG | DIASTOLIC BLOOD PRESSURE: 53 MMHG

## 2018-09-25 VITALS — SYSTOLIC BLOOD PRESSURE: 104 MMHG | DIASTOLIC BLOOD PRESSURE: 57 MMHG

## 2018-09-25 VITALS — SYSTOLIC BLOOD PRESSURE: 134 MMHG | DIASTOLIC BLOOD PRESSURE: 82 MMHG

## 2018-09-25 VITALS — DIASTOLIC BLOOD PRESSURE: 77 MMHG | SYSTOLIC BLOOD PRESSURE: 121 MMHG

## 2018-09-25 VITALS — SYSTOLIC BLOOD PRESSURE: 115 MMHG | DIASTOLIC BLOOD PRESSURE: 74 MMHG

## 2018-09-25 VITALS — DIASTOLIC BLOOD PRESSURE: 68 MMHG | SYSTOLIC BLOOD PRESSURE: 103 MMHG

## 2018-09-25 VITALS — SYSTOLIC BLOOD PRESSURE: 124 MMHG | DIASTOLIC BLOOD PRESSURE: 76 MMHG

## 2018-09-25 VITALS — DIASTOLIC BLOOD PRESSURE: 64 MMHG | SYSTOLIC BLOOD PRESSURE: 110 MMHG

## 2018-09-25 VITALS — DIASTOLIC BLOOD PRESSURE: 80 MMHG | SYSTOLIC BLOOD PRESSURE: 122 MMHG

## 2018-09-25 VITALS — DIASTOLIC BLOOD PRESSURE: 78 MMHG | SYSTOLIC BLOOD PRESSURE: 128 MMHG

## 2018-09-25 VITALS — DIASTOLIC BLOOD PRESSURE: 63 MMHG | SYSTOLIC BLOOD PRESSURE: 94 MMHG

## 2018-09-25 VITALS — SYSTOLIC BLOOD PRESSURE: 131 MMHG | DIASTOLIC BLOOD PRESSURE: 69 MMHG

## 2018-09-25 VITALS — SYSTOLIC BLOOD PRESSURE: 94 MMHG | DIASTOLIC BLOOD PRESSURE: 55 MMHG

## 2018-09-25 VITALS — DIASTOLIC BLOOD PRESSURE: 64 MMHG | SYSTOLIC BLOOD PRESSURE: 111 MMHG

## 2018-09-25 VITALS — SYSTOLIC BLOOD PRESSURE: 108 MMHG | DIASTOLIC BLOOD PRESSURE: 67 MMHG

## 2018-09-25 VITALS — SYSTOLIC BLOOD PRESSURE: 116 MMHG | DIASTOLIC BLOOD PRESSURE: 67 MMHG

## 2018-09-25 VITALS — SYSTOLIC BLOOD PRESSURE: 101 MMHG | DIASTOLIC BLOOD PRESSURE: 58 MMHG

## 2018-09-25 VITALS — DIASTOLIC BLOOD PRESSURE: 62 MMHG | SYSTOLIC BLOOD PRESSURE: 104 MMHG

## 2018-09-25 VITALS — SYSTOLIC BLOOD PRESSURE: 96 MMHG | DIASTOLIC BLOOD PRESSURE: 65 MMHG

## 2018-09-25 VITALS — SYSTOLIC BLOOD PRESSURE: 133 MMHG | DIASTOLIC BLOOD PRESSURE: 82 MMHG

## 2018-09-25 VITALS — DIASTOLIC BLOOD PRESSURE: 72 MMHG | SYSTOLIC BLOOD PRESSURE: 128 MMHG

## 2018-09-25 VITALS — SYSTOLIC BLOOD PRESSURE: 118 MMHG | DIASTOLIC BLOOD PRESSURE: 61 MMHG

## 2018-09-25 VITALS — DIASTOLIC BLOOD PRESSURE: 59 MMHG | SYSTOLIC BLOOD PRESSURE: 107 MMHG

## 2018-09-25 VITALS — DIASTOLIC BLOOD PRESSURE: 70 MMHG | SYSTOLIC BLOOD PRESSURE: 109 MMHG

## 2018-09-25 VITALS — DIASTOLIC BLOOD PRESSURE: 64 MMHG | SYSTOLIC BLOOD PRESSURE: 102 MMHG

## 2018-09-25 VITALS — DIASTOLIC BLOOD PRESSURE: 62 MMHG | SYSTOLIC BLOOD PRESSURE: 130 MMHG

## 2018-09-25 VITALS — SYSTOLIC BLOOD PRESSURE: 108 MMHG | DIASTOLIC BLOOD PRESSURE: 57 MMHG

## 2018-09-25 VITALS — DIASTOLIC BLOOD PRESSURE: 48 MMHG | SYSTOLIC BLOOD PRESSURE: 95 MMHG

## 2018-09-25 VITALS — SYSTOLIC BLOOD PRESSURE: 98 MMHG | DIASTOLIC BLOOD PRESSURE: 66 MMHG

## 2018-09-25 VITALS — SYSTOLIC BLOOD PRESSURE: 112 MMHG | DIASTOLIC BLOOD PRESSURE: 49 MMHG

## 2018-09-25 VITALS — SYSTOLIC BLOOD PRESSURE: 104 MMHG | DIASTOLIC BLOOD PRESSURE: 62 MMHG

## 2018-09-25 VITALS — DIASTOLIC BLOOD PRESSURE: 82 MMHG | SYSTOLIC BLOOD PRESSURE: 134 MMHG

## 2018-09-25 VITALS — DIASTOLIC BLOOD PRESSURE: 62 MMHG | SYSTOLIC BLOOD PRESSURE: 125 MMHG

## 2018-09-25 VITALS — SYSTOLIC BLOOD PRESSURE: 99 MMHG | DIASTOLIC BLOOD PRESSURE: 63 MMHG

## 2018-09-25 VITALS — SYSTOLIC BLOOD PRESSURE: 120 MMHG | DIASTOLIC BLOOD PRESSURE: 62 MMHG

## 2018-09-25 VITALS — DIASTOLIC BLOOD PRESSURE: 62 MMHG | SYSTOLIC BLOOD PRESSURE: 91 MMHG

## 2018-09-25 VITALS — SYSTOLIC BLOOD PRESSURE: 117 MMHG | DIASTOLIC BLOOD PRESSURE: 72 MMHG

## 2018-09-25 VITALS — SYSTOLIC BLOOD PRESSURE: 110 MMHG | DIASTOLIC BLOOD PRESSURE: 65 MMHG

## 2018-09-25 VITALS — DIASTOLIC BLOOD PRESSURE: 78 MMHG | SYSTOLIC BLOOD PRESSURE: 120 MMHG

## 2018-09-25 VITALS — DIASTOLIC BLOOD PRESSURE: 64 MMHG | SYSTOLIC BLOOD PRESSURE: 101 MMHG

## 2018-09-25 VITALS — DIASTOLIC BLOOD PRESSURE: 62 MMHG | SYSTOLIC BLOOD PRESSURE: 99 MMHG

## 2018-09-25 VITALS — SYSTOLIC BLOOD PRESSURE: 123 MMHG | DIASTOLIC BLOOD PRESSURE: 73 MMHG

## 2018-09-25 VITALS — DIASTOLIC BLOOD PRESSURE: 58 MMHG | SYSTOLIC BLOOD PRESSURE: 94 MMHG

## 2018-09-25 VITALS — SYSTOLIC BLOOD PRESSURE: 99 MMHG | DIASTOLIC BLOOD PRESSURE: 54 MMHG

## 2018-09-25 VITALS — DIASTOLIC BLOOD PRESSURE: 91 MMHG | SYSTOLIC BLOOD PRESSURE: 138 MMHG

## 2018-09-25 VITALS — SYSTOLIC BLOOD PRESSURE: 105 MMHG | DIASTOLIC BLOOD PRESSURE: 64 MMHG

## 2018-09-25 VITALS — DIASTOLIC BLOOD PRESSURE: 95 MMHG | SYSTOLIC BLOOD PRESSURE: 147 MMHG

## 2018-09-25 VITALS — DIASTOLIC BLOOD PRESSURE: 63 MMHG | SYSTOLIC BLOOD PRESSURE: 132 MMHG

## 2018-09-25 VITALS — SYSTOLIC BLOOD PRESSURE: 99 MMHG | DIASTOLIC BLOOD PRESSURE: 62 MMHG

## 2018-09-25 VITALS — SYSTOLIC BLOOD PRESSURE: 115 MMHG | DIASTOLIC BLOOD PRESSURE: 76 MMHG

## 2018-09-25 VITALS — SYSTOLIC BLOOD PRESSURE: 119 MMHG | DIASTOLIC BLOOD PRESSURE: 77 MMHG

## 2018-09-25 VITALS — SYSTOLIC BLOOD PRESSURE: 126 MMHG | DIASTOLIC BLOOD PRESSURE: 75 MMHG

## 2018-09-25 VITALS — SYSTOLIC BLOOD PRESSURE: 104 MMHG | DIASTOLIC BLOOD PRESSURE: 67 MMHG

## 2018-09-25 VITALS — SYSTOLIC BLOOD PRESSURE: 89 MMHG | DIASTOLIC BLOOD PRESSURE: 54 MMHG

## 2018-09-25 VITALS — SYSTOLIC BLOOD PRESSURE: 124 MMHG | DIASTOLIC BLOOD PRESSURE: 77 MMHG

## 2018-09-25 VITALS — DIASTOLIC BLOOD PRESSURE: 96 MMHG | SYSTOLIC BLOOD PRESSURE: 142 MMHG

## 2018-09-25 VITALS — DIASTOLIC BLOOD PRESSURE: 53 MMHG | SYSTOLIC BLOOD PRESSURE: 101 MMHG

## 2018-09-25 VITALS — SYSTOLIC BLOOD PRESSURE: 111 MMHG | DIASTOLIC BLOOD PRESSURE: 65 MMHG

## 2018-09-25 VITALS — DIASTOLIC BLOOD PRESSURE: 55 MMHG | SYSTOLIC BLOOD PRESSURE: 93 MMHG

## 2018-09-25 VITALS — DIASTOLIC BLOOD PRESSURE: 54 MMHG | SYSTOLIC BLOOD PRESSURE: 89 MMHG

## 2018-09-25 VITALS — SYSTOLIC BLOOD PRESSURE: 92 MMHG | DIASTOLIC BLOOD PRESSURE: 57 MMHG

## 2018-09-25 VITALS — DIASTOLIC BLOOD PRESSURE: 77 MMHG | SYSTOLIC BLOOD PRESSURE: 115 MMHG

## 2018-09-25 VITALS — SYSTOLIC BLOOD PRESSURE: 109 MMHG | DIASTOLIC BLOOD PRESSURE: 63 MMHG

## 2018-09-25 VITALS — SYSTOLIC BLOOD PRESSURE: 92 MMHG | DIASTOLIC BLOOD PRESSURE: 49 MMHG

## 2018-09-25 LAB
ALBUMIN SERPL-MCNC: 2 G/DL (ref 3.4–5)
ALP SERPL-CCNC: 75 U/L (ref 45–117)
ALT SERPL-CCNC: 130 U/L (ref 13–56)
ANION GAP SERPL CALCULATED.3IONS-SCNC: 8 MMOL/L (ref 5–15)
BILIRUB SERPL-MCNC: 1.9 MG/DL (ref 0.2–1)
BUN SERPL-MCNC: 26 MG/DL (ref 7–18)
BUN/CREAT SERPL: 40
CALCIUM SERPL-MCNC: 7.9 MG/DL (ref 8.5–10.1)
CHLORIDE SERPL-SCNC: 96 MMOL/L (ref 98–107)
CO2 SERPL-SCNC: 36 MMOL/L (ref 21–32)
GLUCOSE SERPL-MCNC: 116 MG/DL (ref 74–106)
HCT VFR BLD AUTO: 37.7 % (ref 36–46)
HGB BLD-MCNC: 12 G/DL (ref 12.2–16.2)
MCH RBC QN AUTO: 26.1 PG (ref 28–32)
MCV RBC AUTO: 81.9 FL (ref 80–100)
NRBC BLD QL AUTO: 0 %
POTASSIUM SERPL-SCNC: 3.7 MMOL/L (ref 3.5–5.1)
PROT SERPL-MCNC: 5.6 G/DL (ref 6.4–8.2)
SODIUM SERPL-SCNC: 140 MMOL/L (ref 136–145)

## 2018-09-25 RX ADMIN — IPRATROPIUM BROMIDE SCH MG: 0.5 SOLUTION RESPIRATORY (INHALATION) at 11:34

## 2018-09-25 RX ADMIN — FUROSEMIDE SCH MG: 10 INJECTION, SOLUTION INTRAMUSCULAR; INTRAVENOUS at 18:09

## 2018-09-25 RX ADMIN — FENTANYL CITRATE SCH MLS/HR: 50 INJECTION, SOLUTION INTRAMUSCULAR; INTRAVENOUS at 03:27

## 2018-09-25 RX ADMIN — MIDAZOLAM SCH MLS/HR: 5 INJECTION, SOLUTION INTRAMUSCULAR; INTRAVENOUS at 12:17

## 2018-09-25 RX ADMIN — ALBUTEROL SULFATE SCH MG: 2.5 SOLUTION RESPIRATORY (INHALATION) at 11:34

## 2018-09-25 RX ADMIN — MIDAZOLAM SCH MLS/HR: 5 INJECTION, SOLUTION INTRAMUSCULAR; INTRAVENOUS at 08:42

## 2018-09-25 RX ADMIN — MIDAZOLAM SCH MLS/HR: 5 INJECTION, SOLUTION INTRAMUSCULAR; INTRAVENOUS at 20:17

## 2018-09-25 RX ADMIN — SACUBITRIL AND VALSARTAN SCH TAB: 24; 26 TABLET, FILM COATED ORAL at 10:00

## 2018-09-25 RX ADMIN — MEROPENEM SCH MLS/HR: 1 INJECTION, POWDER, FOR SOLUTION INTRAVENOUS at 18:04

## 2018-09-25 RX ADMIN — MIDAZOLAM SCH MLS/HR: 5 INJECTION, SOLUTION INTRAMUSCULAR; INTRAVENOUS at 04:45

## 2018-09-25 RX ADMIN — ALBUTEROL SULFATE SCH MG: 2.5 SOLUTION RESPIRATORY (INHALATION) at 00:00

## 2018-09-25 RX ADMIN — IPRATROPIUM BROMIDE SCH MG: 0.5 SOLUTION RESPIRATORY (INHALATION) at 06:15

## 2018-09-25 RX ADMIN — MEROPENEM SCH MLS/HR: 1 INJECTION, POWDER, FOR SOLUTION INTRAVENOUS at 00:54

## 2018-09-25 RX ADMIN — SACUBITRIL AND VALSARTAN SCH TAB: 24; 26 TABLET, FILM COATED ORAL at 21:46

## 2018-09-25 RX ADMIN — FUROSEMIDE SCH MG: 10 INJECTION, SOLUTION INTRAMUSCULAR; INTRAVENOUS at 06:19

## 2018-09-25 RX ADMIN — ALBUTEROL SULFATE SCH MG: 2.5 SOLUTION RESPIRATORY (INHALATION) at 06:15

## 2018-09-25 RX ADMIN — BUDESONIDE SCH MG: 0.5 SUSPENSION RESPIRATORY (INHALATION) at 18:42

## 2018-09-25 RX ADMIN — ALBUTEROL SULFATE SCH MG: 2.5 SOLUTION RESPIRATORY (INHALATION) at 18:42

## 2018-09-25 RX ADMIN — MEROPENEM SCH MLS/HR: 1 INJECTION, POWDER, FOR SOLUTION INTRAVENOUS at 09:41

## 2018-09-25 RX ADMIN — PANTOPRAZOLE SODIUM SCH MG: 40 INJECTION, POWDER, FOR SOLUTION INTRAVENOUS at 09:40

## 2018-09-25 RX ADMIN — SPIRONOLACTONE SCH MG: 25 TABLET, FILM COATED ORAL at 18:10

## 2018-09-25 RX ADMIN — SODIUM CHLORIDE SCH ML: 9 INJECTION INTRAMUSCULAR; INTRAVENOUS; SUBCUTANEOUS at 09:41

## 2018-09-25 RX ADMIN — SPIRONOLACTONE SCH MG: 25 TABLET, FILM COATED ORAL at 06:19

## 2018-09-25 RX ADMIN — PROPOFOL SCH MLS/HR: 10 INJECTION, EMULSION INTRAVENOUS at 22:44

## 2018-09-25 RX ADMIN — IPRATROPIUM BROMIDE SCH MG: 0.5 SOLUTION RESPIRATORY (INHALATION) at 00:00

## 2018-09-25 RX ADMIN — MIDAZOLAM SCH MLS/HR: 5 INJECTION, SOLUTION INTRAMUSCULAR; INTRAVENOUS at 16:26

## 2018-09-25 RX ADMIN — DRONEDARONE SCH MG: 400 TABLET, FILM COATED ORAL at 09:40

## 2018-09-25 RX ADMIN — APIXABAN SCH MG: 5 TABLET, FILM COATED ORAL at 09:40

## 2018-09-25 RX ADMIN — FENTANYL CITRATE SCH MLS/HR: 50 INJECTION, SOLUTION INTRAMUSCULAR; INTRAVENOUS at 15:08

## 2018-09-25 RX ADMIN — IPRATROPIUM BROMIDE SCH MG: 0.5 SOLUTION RESPIRATORY (INHALATION) at 18:42

## 2018-09-25 RX ADMIN — BUDESONIDE SCH MG: 0.5 SUSPENSION RESPIRATORY (INHALATION) at 06:15

## 2018-09-25 RX ADMIN — NOREPINEPHRINE BITARTRATE SCH MLS/HR: 1 INJECTION, SOLUTION, CONCENTRATE INTRAVENOUS at 18:10

## 2018-09-25 RX ADMIN — DRONEDARONE SCH MG: 400 TABLET, FILM COATED ORAL at 21:44

## 2018-09-25 RX ADMIN — SODIUM CHLORIDE SCH ML: 9 INJECTION INTRAMUSCULAR; INTRAVENOUS; SUBCUTANEOUS at 21:46

## 2018-09-25 RX ADMIN — APIXABAN SCH MG: 5 TABLET, FILM COATED ORAL at 21:45

## 2018-09-25 RX ADMIN — RANOLAZINE SCH MG: 500 TABLET, FILM COATED, EXTENDED RELEASE ORAL at 09:40

## 2018-09-25 RX ADMIN — RANOLAZINE SCH MG: 500 TABLET, FILM COATED, EXTENDED RELEASE ORAL at 21:46

## 2018-09-25 RX ADMIN — PROPOFOL SCH MLS/HR: 10 INJECTION, EMULSION INTRAVENOUS at 13:55

## 2018-09-26 VITALS — SYSTOLIC BLOOD PRESSURE: 99 MMHG | DIASTOLIC BLOOD PRESSURE: 59 MMHG

## 2018-09-26 VITALS — DIASTOLIC BLOOD PRESSURE: 62 MMHG | SYSTOLIC BLOOD PRESSURE: 112 MMHG

## 2018-09-26 VITALS — SYSTOLIC BLOOD PRESSURE: 120 MMHG | DIASTOLIC BLOOD PRESSURE: 71 MMHG

## 2018-09-26 VITALS — SYSTOLIC BLOOD PRESSURE: 96 MMHG | DIASTOLIC BLOOD PRESSURE: 57 MMHG

## 2018-09-26 VITALS — SYSTOLIC BLOOD PRESSURE: 100 MMHG | DIASTOLIC BLOOD PRESSURE: 62 MMHG

## 2018-09-26 VITALS — DIASTOLIC BLOOD PRESSURE: 76 MMHG | SYSTOLIC BLOOD PRESSURE: 121 MMHG

## 2018-09-26 VITALS — SYSTOLIC BLOOD PRESSURE: 86 MMHG | DIASTOLIC BLOOD PRESSURE: 56 MMHG

## 2018-09-26 VITALS — DIASTOLIC BLOOD PRESSURE: 59 MMHG | SYSTOLIC BLOOD PRESSURE: 102 MMHG

## 2018-09-26 VITALS — DIASTOLIC BLOOD PRESSURE: 62 MMHG | SYSTOLIC BLOOD PRESSURE: 96 MMHG

## 2018-09-26 VITALS — DIASTOLIC BLOOD PRESSURE: 69 MMHG | SYSTOLIC BLOOD PRESSURE: 111 MMHG

## 2018-09-26 VITALS — DIASTOLIC BLOOD PRESSURE: 78 MMHG | SYSTOLIC BLOOD PRESSURE: 122 MMHG

## 2018-09-26 VITALS — SYSTOLIC BLOOD PRESSURE: 107 MMHG | DIASTOLIC BLOOD PRESSURE: 63 MMHG

## 2018-09-26 VITALS — DIASTOLIC BLOOD PRESSURE: 73 MMHG | SYSTOLIC BLOOD PRESSURE: 117 MMHG

## 2018-09-26 VITALS — DIASTOLIC BLOOD PRESSURE: 70 MMHG | SYSTOLIC BLOOD PRESSURE: 112 MMHG

## 2018-09-26 VITALS — DIASTOLIC BLOOD PRESSURE: 64 MMHG | SYSTOLIC BLOOD PRESSURE: 100 MMHG

## 2018-09-26 VITALS — SYSTOLIC BLOOD PRESSURE: 98 MMHG | DIASTOLIC BLOOD PRESSURE: 58 MMHG

## 2018-09-26 VITALS — DIASTOLIC BLOOD PRESSURE: 56 MMHG | SYSTOLIC BLOOD PRESSURE: 93 MMHG

## 2018-09-26 VITALS — SYSTOLIC BLOOD PRESSURE: 134 MMHG | DIASTOLIC BLOOD PRESSURE: 75 MMHG

## 2018-09-26 VITALS — DIASTOLIC BLOOD PRESSURE: 66 MMHG | SYSTOLIC BLOOD PRESSURE: 105 MMHG

## 2018-09-26 VITALS — DIASTOLIC BLOOD PRESSURE: 70 MMHG | SYSTOLIC BLOOD PRESSURE: 113 MMHG

## 2018-09-26 VITALS — DIASTOLIC BLOOD PRESSURE: 71 MMHG | SYSTOLIC BLOOD PRESSURE: 101 MMHG

## 2018-09-26 VITALS — DIASTOLIC BLOOD PRESSURE: 70 MMHG | SYSTOLIC BLOOD PRESSURE: 107 MMHG

## 2018-09-26 VITALS — SYSTOLIC BLOOD PRESSURE: 94 MMHG | DIASTOLIC BLOOD PRESSURE: 64 MMHG

## 2018-09-26 VITALS — SYSTOLIC BLOOD PRESSURE: 99 MMHG | DIASTOLIC BLOOD PRESSURE: 64 MMHG

## 2018-09-26 VITALS — DIASTOLIC BLOOD PRESSURE: 44 MMHG | SYSTOLIC BLOOD PRESSURE: 109 MMHG

## 2018-09-26 VITALS — DIASTOLIC BLOOD PRESSURE: 84 MMHG | SYSTOLIC BLOOD PRESSURE: 117 MMHG

## 2018-09-26 VITALS — DIASTOLIC BLOOD PRESSURE: 61 MMHG | SYSTOLIC BLOOD PRESSURE: 101 MMHG

## 2018-09-26 VITALS — DIASTOLIC BLOOD PRESSURE: 43 MMHG | SYSTOLIC BLOOD PRESSURE: 86 MMHG

## 2018-09-26 VITALS — SYSTOLIC BLOOD PRESSURE: 91 MMHG | DIASTOLIC BLOOD PRESSURE: 45 MMHG

## 2018-09-26 VITALS — DIASTOLIC BLOOD PRESSURE: 39 MMHG | SYSTOLIC BLOOD PRESSURE: 85 MMHG

## 2018-09-26 VITALS — SYSTOLIC BLOOD PRESSURE: 98 MMHG | DIASTOLIC BLOOD PRESSURE: 56 MMHG

## 2018-09-26 VITALS — DIASTOLIC BLOOD PRESSURE: 63 MMHG | SYSTOLIC BLOOD PRESSURE: 99 MMHG

## 2018-09-26 VITALS — SYSTOLIC BLOOD PRESSURE: 100 MMHG | DIASTOLIC BLOOD PRESSURE: 64 MMHG

## 2018-09-26 VITALS — SYSTOLIC BLOOD PRESSURE: 95 MMHG | DIASTOLIC BLOOD PRESSURE: 61 MMHG

## 2018-09-26 VITALS — SYSTOLIC BLOOD PRESSURE: 113 MMHG | DIASTOLIC BLOOD PRESSURE: 65 MMHG

## 2018-09-26 VITALS — DIASTOLIC BLOOD PRESSURE: 75 MMHG | SYSTOLIC BLOOD PRESSURE: 120 MMHG

## 2018-09-26 VITALS — SYSTOLIC BLOOD PRESSURE: 96 MMHG | DIASTOLIC BLOOD PRESSURE: 64 MMHG

## 2018-09-26 VITALS — DIASTOLIC BLOOD PRESSURE: 64 MMHG | SYSTOLIC BLOOD PRESSURE: 109 MMHG

## 2018-09-26 VITALS — DIASTOLIC BLOOD PRESSURE: 81 MMHG | SYSTOLIC BLOOD PRESSURE: 147 MMHG

## 2018-09-26 VITALS — SYSTOLIC BLOOD PRESSURE: 93 MMHG | DIASTOLIC BLOOD PRESSURE: 65 MMHG

## 2018-09-26 VITALS — SYSTOLIC BLOOD PRESSURE: 102 MMHG | DIASTOLIC BLOOD PRESSURE: 61 MMHG

## 2018-09-26 VITALS — DIASTOLIC BLOOD PRESSURE: 88 MMHG | SYSTOLIC BLOOD PRESSURE: 133 MMHG

## 2018-09-26 VITALS — SYSTOLIC BLOOD PRESSURE: 127 MMHG | DIASTOLIC BLOOD PRESSURE: 81 MMHG

## 2018-09-26 VITALS — SYSTOLIC BLOOD PRESSURE: 112 MMHG | DIASTOLIC BLOOD PRESSURE: 73 MMHG

## 2018-09-26 VITALS — DIASTOLIC BLOOD PRESSURE: 56 MMHG | SYSTOLIC BLOOD PRESSURE: 98 MMHG

## 2018-09-26 VITALS — SYSTOLIC BLOOD PRESSURE: 108 MMHG | DIASTOLIC BLOOD PRESSURE: 67 MMHG

## 2018-09-26 VITALS — DIASTOLIC BLOOD PRESSURE: 50 MMHG | SYSTOLIC BLOOD PRESSURE: 101 MMHG

## 2018-09-26 VITALS — DIASTOLIC BLOOD PRESSURE: 51 MMHG | SYSTOLIC BLOOD PRESSURE: 92 MMHG

## 2018-09-26 VITALS — DIASTOLIC BLOOD PRESSURE: 63 MMHG | SYSTOLIC BLOOD PRESSURE: 95 MMHG

## 2018-09-26 VITALS — SYSTOLIC BLOOD PRESSURE: 133 MMHG | DIASTOLIC BLOOD PRESSURE: 88 MMHG

## 2018-09-26 VITALS — SYSTOLIC BLOOD PRESSURE: 83 MMHG | DIASTOLIC BLOOD PRESSURE: 46 MMHG

## 2018-09-26 VITALS — SYSTOLIC BLOOD PRESSURE: 93 MMHG | DIASTOLIC BLOOD PRESSURE: 56 MMHG

## 2018-09-26 VITALS — SYSTOLIC BLOOD PRESSURE: 128 MMHG | DIASTOLIC BLOOD PRESSURE: 85 MMHG

## 2018-09-26 VITALS — DIASTOLIC BLOOD PRESSURE: 79 MMHG | SYSTOLIC BLOOD PRESSURE: 131 MMHG

## 2018-09-26 VITALS — SYSTOLIC BLOOD PRESSURE: 89 MMHG | DIASTOLIC BLOOD PRESSURE: 59 MMHG

## 2018-09-26 VITALS — SYSTOLIC BLOOD PRESSURE: 116 MMHG | DIASTOLIC BLOOD PRESSURE: 71 MMHG

## 2018-09-26 VITALS — DIASTOLIC BLOOD PRESSURE: 80 MMHG | SYSTOLIC BLOOD PRESSURE: 117 MMHG

## 2018-09-26 VITALS — DIASTOLIC BLOOD PRESSURE: 82 MMHG | SYSTOLIC BLOOD PRESSURE: 135 MMHG

## 2018-09-26 VITALS — DIASTOLIC BLOOD PRESSURE: 57 MMHG | SYSTOLIC BLOOD PRESSURE: 101 MMHG

## 2018-09-26 VITALS — SYSTOLIC BLOOD PRESSURE: 103 MMHG | DIASTOLIC BLOOD PRESSURE: 61 MMHG

## 2018-09-26 VITALS — SYSTOLIC BLOOD PRESSURE: 109 MMHG | DIASTOLIC BLOOD PRESSURE: 66 MMHG

## 2018-09-26 VITALS — DIASTOLIC BLOOD PRESSURE: 54 MMHG | SYSTOLIC BLOOD PRESSURE: 92 MMHG

## 2018-09-26 VITALS — SYSTOLIC BLOOD PRESSURE: 101 MMHG | DIASTOLIC BLOOD PRESSURE: 61 MMHG

## 2018-09-26 VITALS — DIASTOLIC BLOOD PRESSURE: 70 MMHG | SYSTOLIC BLOOD PRESSURE: 101 MMHG

## 2018-09-26 VITALS — DIASTOLIC BLOOD PRESSURE: 59 MMHG | SYSTOLIC BLOOD PRESSURE: 99 MMHG

## 2018-09-26 VITALS — DIASTOLIC BLOOD PRESSURE: 61 MMHG | SYSTOLIC BLOOD PRESSURE: 96 MMHG

## 2018-09-26 VITALS — SYSTOLIC BLOOD PRESSURE: 104 MMHG | DIASTOLIC BLOOD PRESSURE: 60 MMHG

## 2018-09-26 VITALS — DIASTOLIC BLOOD PRESSURE: 70 MMHG | SYSTOLIC BLOOD PRESSURE: 98 MMHG

## 2018-09-26 VITALS — DIASTOLIC BLOOD PRESSURE: 67 MMHG | SYSTOLIC BLOOD PRESSURE: 113 MMHG

## 2018-09-26 VITALS — DIASTOLIC BLOOD PRESSURE: 61 MMHG | SYSTOLIC BLOOD PRESSURE: 111 MMHG

## 2018-09-26 VITALS — DIASTOLIC BLOOD PRESSURE: 83 MMHG | SYSTOLIC BLOOD PRESSURE: 116 MMHG

## 2018-09-26 VITALS — SYSTOLIC BLOOD PRESSURE: 117 MMHG | DIASTOLIC BLOOD PRESSURE: 65 MMHG

## 2018-09-26 VITALS — DIASTOLIC BLOOD PRESSURE: 54 MMHG | SYSTOLIC BLOOD PRESSURE: 95 MMHG

## 2018-09-26 VITALS — SYSTOLIC BLOOD PRESSURE: 124 MMHG | DIASTOLIC BLOOD PRESSURE: 68 MMHG

## 2018-09-26 VITALS — DIASTOLIC BLOOD PRESSURE: 60 MMHG | SYSTOLIC BLOOD PRESSURE: 104 MMHG

## 2018-09-26 VITALS — DIASTOLIC BLOOD PRESSURE: 70 MMHG | SYSTOLIC BLOOD PRESSURE: 109 MMHG

## 2018-09-26 VITALS — SYSTOLIC BLOOD PRESSURE: 88 MMHG | DIASTOLIC BLOOD PRESSURE: 46 MMHG

## 2018-09-26 VITALS — SYSTOLIC BLOOD PRESSURE: 93 MMHG | DIASTOLIC BLOOD PRESSURE: 55 MMHG

## 2018-09-26 VITALS — DIASTOLIC BLOOD PRESSURE: 82 MMHG | SYSTOLIC BLOOD PRESSURE: 125 MMHG

## 2018-09-26 VITALS — DIASTOLIC BLOOD PRESSURE: 75 MMHG | SYSTOLIC BLOOD PRESSURE: 122 MMHG

## 2018-09-26 VITALS — SYSTOLIC BLOOD PRESSURE: 109 MMHG | DIASTOLIC BLOOD PRESSURE: 70 MMHG

## 2018-09-26 VITALS — SYSTOLIC BLOOD PRESSURE: 117 MMHG | DIASTOLIC BLOOD PRESSURE: 76 MMHG

## 2018-09-26 VITALS — SYSTOLIC BLOOD PRESSURE: 122 MMHG | DIASTOLIC BLOOD PRESSURE: 72 MMHG

## 2018-09-26 VITALS — DIASTOLIC BLOOD PRESSURE: 79 MMHG | SYSTOLIC BLOOD PRESSURE: 107 MMHG

## 2018-09-26 VITALS — DIASTOLIC BLOOD PRESSURE: 62 MMHG | SYSTOLIC BLOOD PRESSURE: 100 MMHG

## 2018-09-26 VITALS — DIASTOLIC BLOOD PRESSURE: 83 MMHG | SYSTOLIC BLOOD PRESSURE: 114 MMHG

## 2018-09-26 VITALS — DIASTOLIC BLOOD PRESSURE: 74 MMHG | SYSTOLIC BLOOD PRESSURE: 124 MMHG

## 2018-09-26 VITALS — SYSTOLIC BLOOD PRESSURE: 108 MMHG | DIASTOLIC BLOOD PRESSURE: 58 MMHG

## 2018-09-26 VITALS — DIASTOLIC BLOOD PRESSURE: 31 MMHG | SYSTOLIC BLOOD PRESSURE: 91 MMHG

## 2018-09-26 VITALS — SYSTOLIC BLOOD PRESSURE: 113 MMHG | DIASTOLIC BLOOD PRESSURE: 71 MMHG

## 2018-09-26 VITALS — DIASTOLIC BLOOD PRESSURE: 66 MMHG | SYSTOLIC BLOOD PRESSURE: 100 MMHG

## 2018-09-26 VITALS — SYSTOLIC BLOOD PRESSURE: 103 MMHG | DIASTOLIC BLOOD PRESSURE: 63 MMHG

## 2018-09-26 VITALS — DIASTOLIC BLOOD PRESSURE: 57 MMHG | SYSTOLIC BLOOD PRESSURE: 98 MMHG

## 2018-09-26 VITALS — SYSTOLIC BLOOD PRESSURE: 119 MMHG | DIASTOLIC BLOOD PRESSURE: 74 MMHG

## 2018-09-26 VITALS — DIASTOLIC BLOOD PRESSURE: 57 MMHG | SYSTOLIC BLOOD PRESSURE: 108 MMHG

## 2018-09-26 VITALS — SYSTOLIC BLOOD PRESSURE: 115 MMHG | DIASTOLIC BLOOD PRESSURE: 78 MMHG

## 2018-09-26 LAB
ANION GAP SERPL CALCULATED.3IONS-SCNC: 7 MMOL/L (ref 5–15)
BUN SERPL-MCNC: 24 MG/DL (ref 7–18)
BUN/CREAT SERPL: 43.6
CALCIUM SERPL-MCNC: 7.7 MG/DL (ref 8.5–10.1)
CHLORIDE SERPL-SCNC: 101 MMOL/L (ref 98–107)
CO2 SERPL-SCNC: 33 MMOL/L (ref 21–32)
GLUCOSE SERPL-MCNC: 117 MG/DL (ref 74–106)
POTASSIUM SERPL-SCNC: 3.7 MMOL/L (ref 3.5–5.1)
SODIUM SERPL-SCNC: 141 MMOL/L (ref 136–145)

## 2018-09-26 RX ADMIN — BUDESONIDE SCH MG: 0.5 SUSPENSION RESPIRATORY (INHALATION) at 18:35

## 2018-09-26 RX ADMIN — FUROSEMIDE SCH MG: 10 INJECTION, SOLUTION INTRAMUSCULAR; INTRAVENOUS at 18:14

## 2018-09-26 RX ADMIN — IPRATROPIUM BROMIDE SCH MG: 0.5 SOLUTION RESPIRATORY (INHALATION) at 05:52

## 2018-09-26 RX ADMIN — DRONEDARONE SCH MG: 400 TABLET, FILM COATED ORAL at 14:32

## 2018-09-26 RX ADMIN — DRONEDARONE SCH MG: 400 TABLET, FILM COATED ORAL at 22:33

## 2018-09-26 RX ADMIN — SACUBITRIL AND VALSARTAN SCH TAB: 24; 26 TABLET, FILM COATED ORAL at 22:00

## 2018-09-26 RX ADMIN — MEROPENEM SCH MLS/HR: 1 INJECTION, POWDER, FOR SOLUTION INTRAVENOUS at 10:00

## 2018-09-26 RX ADMIN — ALBUTEROL SULFATE SCH MG: 2.5 SOLUTION RESPIRATORY (INHALATION) at 00:29

## 2018-09-26 RX ADMIN — MIDAZOLAM SCH MLS/HR: 5 INJECTION, SOLUTION INTRAMUSCULAR; INTRAVENOUS at 01:14

## 2018-09-26 RX ADMIN — APIXABAN SCH MG: 5 TABLET, FILM COATED ORAL at 14:32

## 2018-09-26 RX ADMIN — APIXABAN SCH MG: 5 TABLET, FILM COATED ORAL at 22:33

## 2018-09-26 RX ADMIN — ALBUTEROL SULFATE SCH MG: 2.5 SOLUTION RESPIRATORY (INHALATION) at 18:35

## 2018-09-26 RX ADMIN — SODIUM CHLORIDE SCH ML: 9 INJECTION INTRAMUSCULAR; INTRAVENOUS; SUBCUTANEOUS at 22:00

## 2018-09-26 RX ADMIN — PANTOPRAZOLE SODIUM SCH MG: 40 INJECTION, POWDER, FOR SOLUTION INTRAVENOUS at 14:31

## 2018-09-26 RX ADMIN — RANOLAZINE SCH MG: 500 TABLET, FILM COATED, EXTENDED RELEASE ORAL at 22:00

## 2018-09-26 RX ADMIN — BUDESONIDE SCH MG: 0.5 SUSPENSION RESPIRATORY (INHALATION) at 05:52

## 2018-09-26 RX ADMIN — FUROSEMIDE SCH MG: 10 INJECTION, SOLUTION INTRAMUSCULAR; INTRAVENOUS at 05:36

## 2018-09-26 RX ADMIN — SPIRONOLACTONE SCH MG: 25 TABLET, FILM COATED ORAL at 18:00

## 2018-09-26 RX ADMIN — NOREPINEPHRINE BITARTRATE SCH MLS/HR: 1 INJECTION, SOLUTION, CONCENTRATE INTRAVENOUS at 23:05

## 2018-09-26 RX ADMIN — SACUBITRIL AND VALSARTAN SCH TAB: 24; 26 TABLET, FILM COATED ORAL at 14:32

## 2018-09-26 RX ADMIN — ALBUTEROL SULFATE SCH MG: 2.5 SOLUTION RESPIRATORY (INHALATION) at 05:51

## 2018-09-26 RX ADMIN — SPIRONOLACTONE SCH MG: 25 TABLET, FILM COATED ORAL at 05:36

## 2018-09-26 RX ADMIN — FENTANYL CITRATE SCH MLS/HR: 50 INJECTION, SOLUTION INTRAMUSCULAR; INTRAVENOUS at 01:20

## 2018-09-26 RX ADMIN — MEROPENEM SCH MLS/HR: 1 INJECTION, POWDER, FOR SOLUTION INTRAVENOUS at 18:14

## 2018-09-26 RX ADMIN — SODIUM CHLORIDE SCH ML: 9 INJECTION INTRAMUSCULAR; INTRAVENOUS; SUBCUTANEOUS at 10:00

## 2018-09-26 RX ADMIN — RANOLAZINE SCH MG: 500 TABLET, FILM COATED, EXTENDED RELEASE ORAL at 14:32

## 2018-09-26 RX ADMIN — ALBUTEROL SULFATE SCH MG: 2.5 SOLUTION RESPIRATORY (INHALATION) at 11:41

## 2018-09-26 RX ADMIN — PROPOFOL SCH MLS/HR: 10 INJECTION, EMULSION INTRAVENOUS at 20:53

## 2018-09-26 RX ADMIN — IPRATROPIUM BROMIDE SCH MG: 0.5 SOLUTION RESPIRATORY (INHALATION) at 18:35

## 2018-09-26 RX ADMIN — MEROPENEM SCH MLS/HR: 1 INJECTION, POWDER, FOR SOLUTION INTRAVENOUS at 00:47

## 2018-09-26 RX ADMIN — IPRATROPIUM BROMIDE SCH MG: 0.5 SOLUTION RESPIRATORY (INHALATION) at 11:41

## 2018-09-26 RX ADMIN — IPRATROPIUM BROMIDE SCH MG: 0.5 SOLUTION RESPIRATORY (INHALATION) at 00:29

## 2018-09-27 VITALS — DIASTOLIC BLOOD PRESSURE: 74 MMHG | SYSTOLIC BLOOD PRESSURE: 109 MMHG

## 2018-09-27 VITALS — DIASTOLIC BLOOD PRESSURE: 55 MMHG | SYSTOLIC BLOOD PRESSURE: 104 MMHG

## 2018-09-27 VITALS — DIASTOLIC BLOOD PRESSURE: 59 MMHG | SYSTOLIC BLOOD PRESSURE: 91 MMHG

## 2018-09-27 VITALS — SYSTOLIC BLOOD PRESSURE: 107 MMHG | DIASTOLIC BLOOD PRESSURE: 57 MMHG

## 2018-09-27 VITALS — SYSTOLIC BLOOD PRESSURE: 112 MMHG | DIASTOLIC BLOOD PRESSURE: 65 MMHG

## 2018-09-27 VITALS — SYSTOLIC BLOOD PRESSURE: 93 MMHG | DIASTOLIC BLOOD PRESSURE: 47 MMHG

## 2018-09-27 VITALS — SYSTOLIC BLOOD PRESSURE: 100 MMHG | DIASTOLIC BLOOD PRESSURE: 55 MMHG

## 2018-09-27 VITALS — SYSTOLIC BLOOD PRESSURE: 100 MMHG | DIASTOLIC BLOOD PRESSURE: 50 MMHG

## 2018-09-27 VITALS — DIASTOLIC BLOOD PRESSURE: 42 MMHG | SYSTOLIC BLOOD PRESSURE: 84 MMHG

## 2018-09-27 VITALS — SYSTOLIC BLOOD PRESSURE: 117 MMHG | DIASTOLIC BLOOD PRESSURE: 71 MMHG

## 2018-09-27 VITALS — DIASTOLIC BLOOD PRESSURE: 49 MMHG | SYSTOLIC BLOOD PRESSURE: 97 MMHG

## 2018-09-27 VITALS — SYSTOLIC BLOOD PRESSURE: 126 MMHG | DIASTOLIC BLOOD PRESSURE: 69 MMHG

## 2018-09-27 VITALS — SYSTOLIC BLOOD PRESSURE: 111 MMHG | DIASTOLIC BLOOD PRESSURE: 56 MMHG

## 2018-09-27 VITALS — SYSTOLIC BLOOD PRESSURE: 103 MMHG | DIASTOLIC BLOOD PRESSURE: 52 MMHG

## 2018-09-27 VITALS — DIASTOLIC BLOOD PRESSURE: 60 MMHG | SYSTOLIC BLOOD PRESSURE: 84 MMHG

## 2018-09-27 VITALS — SYSTOLIC BLOOD PRESSURE: 104 MMHG | DIASTOLIC BLOOD PRESSURE: 60 MMHG

## 2018-09-27 VITALS — DIASTOLIC BLOOD PRESSURE: 52 MMHG | SYSTOLIC BLOOD PRESSURE: 108 MMHG

## 2018-09-27 VITALS — SYSTOLIC BLOOD PRESSURE: 108 MMHG | DIASTOLIC BLOOD PRESSURE: 62 MMHG

## 2018-09-27 VITALS — DIASTOLIC BLOOD PRESSURE: 58 MMHG | SYSTOLIC BLOOD PRESSURE: 113 MMHG

## 2018-09-27 VITALS — SYSTOLIC BLOOD PRESSURE: 96 MMHG | DIASTOLIC BLOOD PRESSURE: 59 MMHG

## 2018-09-27 VITALS — DIASTOLIC BLOOD PRESSURE: 46 MMHG | SYSTOLIC BLOOD PRESSURE: 98 MMHG

## 2018-09-27 VITALS — DIASTOLIC BLOOD PRESSURE: 63 MMHG | SYSTOLIC BLOOD PRESSURE: 92 MMHG

## 2018-09-27 VITALS — DIASTOLIC BLOOD PRESSURE: 59 MMHG | SYSTOLIC BLOOD PRESSURE: 96 MMHG

## 2018-09-27 VITALS — SYSTOLIC BLOOD PRESSURE: 118 MMHG | DIASTOLIC BLOOD PRESSURE: 65 MMHG

## 2018-09-27 VITALS — DIASTOLIC BLOOD PRESSURE: 61 MMHG | SYSTOLIC BLOOD PRESSURE: 102 MMHG

## 2018-09-27 VITALS — SYSTOLIC BLOOD PRESSURE: 94 MMHG | DIASTOLIC BLOOD PRESSURE: 55 MMHG

## 2018-09-27 VITALS — SYSTOLIC BLOOD PRESSURE: 89 MMHG | DIASTOLIC BLOOD PRESSURE: 58 MMHG

## 2018-09-27 VITALS — DIASTOLIC BLOOD PRESSURE: 65 MMHG | SYSTOLIC BLOOD PRESSURE: 95 MMHG

## 2018-09-27 VITALS — DIASTOLIC BLOOD PRESSURE: 66 MMHG | SYSTOLIC BLOOD PRESSURE: 92 MMHG

## 2018-09-27 VITALS — SYSTOLIC BLOOD PRESSURE: 102 MMHG | DIASTOLIC BLOOD PRESSURE: 59 MMHG

## 2018-09-27 VITALS — DIASTOLIC BLOOD PRESSURE: 75 MMHG | SYSTOLIC BLOOD PRESSURE: 112 MMHG

## 2018-09-27 VITALS — SYSTOLIC BLOOD PRESSURE: 87 MMHG | DIASTOLIC BLOOD PRESSURE: 55 MMHG

## 2018-09-27 VITALS — SYSTOLIC BLOOD PRESSURE: 126 MMHG | DIASTOLIC BLOOD PRESSURE: 75 MMHG

## 2018-09-27 VITALS — SYSTOLIC BLOOD PRESSURE: 120 MMHG | DIASTOLIC BLOOD PRESSURE: 71 MMHG

## 2018-09-27 VITALS — DIASTOLIC BLOOD PRESSURE: 62 MMHG | SYSTOLIC BLOOD PRESSURE: 107 MMHG

## 2018-09-27 VITALS — SYSTOLIC BLOOD PRESSURE: 97 MMHG | DIASTOLIC BLOOD PRESSURE: 54 MMHG

## 2018-09-27 VITALS — DIASTOLIC BLOOD PRESSURE: 52 MMHG | SYSTOLIC BLOOD PRESSURE: 90 MMHG

## 2018-09-27 VITALS — DIASTOLIC BLOOD PRESSURE: 45 MMHG | SYSTOLIC BLOOD PRESSURE: 94 MMHG

## 2018-09-27 VITALS — SYSTOLIC BLOOD PRESSURE: 102 MMHG | DIASTOLIC BLOOD PRESSURE: 61 MMHG

## 2018-09-27 VITALS — DIASTOLIC BLOOD PRESSURE: 65 MMHG | SYSTOLIC BLOOD PRESSURE: 107 MMHG

## 2018-09-27 VITALS — DIASTOLIC BLOOD PRESSURE: 68 MMHG | SYSTOLIC BLOOD PRESSURE: 108 MMHG

## 2018-09-27 VITALS — DIASTOLIC BLOOD PRESSURE: 60 MMHG | SYSTOLIC BLOOD PRESSURE: 93 MMHG

## 2018-09-27 VITALS — SYSTOLIC BLOOD PRESSURE: 112 MMHG | DIASTOLIC BLOOD PRESSURE: 75 MMHG

## 2018-09-27 VITALS — DIASTOLIC BLOOD PRESSURE: 80 MMHG | SYSTOLIC BLOOD PRESSURE: 122 MMHG

## 2018-09-27 VITALS — DIASTOLIC BLOOD PRESSURE: 47 MMHG | SYSTOLIC BLOOD PRESSURE: 103 MMHG

## 2018-09-27 VITALS — DIASTOLIC BLOOD PRESSURE: 64 MMHG | SYSTOLIC BLOOD PRESSURE: 92 MMHG

## 2018-09-27 VITALS — SYSTOLIC BLOOD PRESSURE: 84 MMHG | DIASTOLIC BLOOD PRESSURE: 50 MMHG

## 2018-09-27 VITALS — SYSTOLIC BLOOD PRESSURE: 86 MMHG | DIASTOLIC BLOOD PRESSURE: 48 MMHG

## 2018-09-27 VITALS — DIASTOLIC BLOOD PRESSURE: 65 MMHG | SYSTOLIC BLOOD PRESSURE: 110 MMHG

## 2018-09-27 VITALS — DIASTOLIC BLOOD PRESSURE: 42 MMHG | SYSTOLIC BLOOD PRESSURE: 93 MMHG

## 2018-09-27 VITALS — DIASTOLIC BLOOD PRESSURE: 55 MMHG | SYSTOLIC BLOOD PRESSURE: 94 MMHG

## 2018-09-27 VITALS — DIASTOLIC BLOOD PRESSURE: 48 MMHG | SYSTOLIC BLOOD PRESSURE: 86 MMHG

## 2018-09-27 VITALS — DIASTOLIC BLOOD PRESSURE: 51 MMHG | SYSTOLIC BLOOD PRESSURE: 102 MMHG

## 2018-09-27 VITALS — DIASTOLIC BLOOD PRESSURE: 62 MMHG | SYSTOLIC BLOOD PRESSURE: 96 MMHG

## 2018-09-27 VITALS — SYSTOLIC BLOOD PRESSURE: 100 MMHG | DIASTOLIC BLOOD PRESSURE: 67 MMHG

## 2018-09-27 VITALS — DIASTOLIC BLOOD PRESSURE: 57 MMHG | SYSTOLIC BLOOD PRESSURE: 100 MMHG

## 2018-09-27 VITALS — DIASTOLIC BLOOD PRESSURE: 65 MMHG | SYSTOLIC BLOOD PRESSURE: 111 MMHG

## 2018-09-27 VITALS — SYSTOLIC BLOOD PRESSURE: 97 MMHG | DIASTOLIC BLOOD PRESSURE: 66 MMHG

## 2018-09-27 VITALS — SYSTOLIC BLOOD PRESSURE: 98 MMHG | DIASTOLIC BLOOD PRESSURE: 50 MMHG

## 2018-09-27 VITALS — DIASTOLIC BLOOD PRESSURE: 51 MMHG | SYSTOLIC BLOOD PRESSURE: 85 MMHG

## 2018-09-27 VITALS — SYSTOLIC BLOOD PRESSURE: 101 MMHG | DIASTOLIC BLOOD PRESSURE: 48 MMHG

## 2018-09-27 VITALS — SYSTOLIC BLOOD PRESSURE: 93 MMHG | DIASTOLIC BLOOD PRESSURE: 51 MMHG

## 2018-09-27 VITALS — DIASTOLIC BLOOD PRESSURE: 66 MMHG | SYSTOLIC BLOOD PRESSURE: 97 MMHG

## 2018-09-27 VITALS — DIASTOLIC BLOOD PRESSURE: 65 MMHG | SYSTOLIC BLOOD PRESSURE: 101 MMHG

## 2018-09-27 VITALS — DIASTOLIC BLOOD PRESSURE: 64 MMHG | SYSTOLIC BLOOD PRESSURE: 95 MMHG

## 2018-09-27 VITALS — SYSTOLIC BLOOD PRESSURE: 102 MMHG | DIASTOLIC BLOOD PRESSURE: 65 MMHG

## 2018-09-27 VITALS — DIASTOLIC BLOOD PRESSURE: 116 MMHG | SYSTOLIC BLOOD PRESSURE: 159 MMHG

## 2018-09-27 VITALS — SYSTOLIC BLOOD PRESSURE: 98 MMHG | DIASTOLIC BLOOD PRESSURE: 62 MMHG

## 2018-09-27 VITALS — SYSTOLIC BLOOD PRESSURE: 93 MMHG | DIASTOLIC BLOOD PRESSURE: 63 MMHG

## 2018-09-27 VITALS — DIASTOLIC BLOOD PRESSURE: 42 MMHG | SYSTOLIC BLOOD PRESSURE: 88 MMHG

## 2018-09-27 VITALS — DIASTOLIC BLOOD PRESSURE: 57 MMHG | SYSTOLIC BLOOD PRESSURE: 84 MMHG

## 2018-09-27 VITALS — SYSTOLIC BLOOD PRESSURE: 105 MMHG | DIASTOLIC BLOOD PRESSURE: 58 MMHG

## 2018-09-27 VITALS — SYSTOLIC BLOOD PRESSURE: 124 MMHG | DIASTOLIC BLOOD PRESSURE: 79 MMHG

## 2018-09-27 VITALS — DIASTOLIC BLOOD PRESSURE: 74 MMHG | SYSTOLIC BLOOD PRESSURE: 131 MMHG

## 2018-09-27 VITALS — SYSTOLIC BLOOD PRESSURE: 96 MMHG | DIASTOLIC BLOOD PRESSURE: 53 MMHG

## 2018-09-27 VITALS — DIASTOLIC BLOOD PRESSURE: 53 MMHG | SYSTOLIC BLOOD PRESSURE: 101 MMHG

## 2018-09-27 VITALS — DIASTOLIC BLOOD PRESSURE: 56 MMHG | SYSTOLIC BLOOD PRESSURE: 99 MMHG

## 2018-09-27 VITALS — DIASTOLIC BLOOD PRESSURE: 65 MMHG | SYSTOLIC BLOOD PRESSURE: 102 MMHG

## 2018-09-27 VITALS — SYSTOLIC BLOOD PRESSURE: 93 MMHG | DIASTOLIC BLOOD PRESSURE: 44 MMHG

## 2018-09-27 VITALS — SYSTOLIC BLOOD PRESSURE: 84 MMHG | DIASTOLIC BLOOD PRESSURE: 45 MMHG

## 2018-09-27 VITALS — SYSTOLIC BLOOD PRESSURE: 99 MMHG | DIASTOLIC BLOOD PRESSURE: 61 MMHG

## 2018-09-27 VITALS — SYSTOLIC BLOOD PRESSURE: 90 MMHG | DIASTOLIC BLOOD PRESSURE: 57 MMHG

## 2018-09-27 VITALS — DIASTOLIC BLOOD PRESSURE: 77 MMHG | SYSTOLIC BLOOD PRESSURE: 129 MMHG

## 2018-09-27 VITALS — DIASTOLIC BLOOD PRESSURE: 72 MMHG | SYSTOLIC BLOOD PRESSURE: 102 MMHG

## 2018-09-27 VITALS — DIASTOLIC BLOOD PRESSURE: 63 MMHG | SYSTOLIC BLOOD PRESSURE: 101 MMHG

## 2018-09-27 VITALS — DIASTOLIC BLOOD PRESSURE: 56 MMHG | SYSTOLIC BLOOD PRESSURE: 109 MMHG

## 2018-09-27 VITALS — DIASTOLIC BLOOD PRESSURE: 65 MMHG | SYSTOLIC BLOOD PRESSURE: 98 MMHG

## 2018-09-27 VITALS — SYSTOLIC BLOOD PRESSURE: 109 MMHG | DIASTOLIC BLOOD PRESSURE: 61 MMHG

## 2018-09-27 VITALS — SYSTOLIC BLOOD PRESSURE: 91 MMHG | DIASTOLIC BLOOD PRESSURE: 52 MMHG

## 2018-09-27 VITALS — DIASTOLIC BLOOD PRESSURE: 71 MMHG | SYSTOLIC BLOOD PRESSURE: 119 MMHG

## 2018-09-27 VITALS — SYSTOLIC BLOOD PRESSURE: 108 MMHG | DIASTOLIC BLOOD PRESSURE: 59 MMHG

## 2018-09-27 VITALS — SYSTOLIC BLOOD PRESSURE: 88 MMHG | DIASTOLIC BLOOD PRESSURE: 67 MMHG

## 2018-09-27 VITALS — SYSTOLIC BLOOD PRESSURE: 85 MMHG | DIASTOLIC BLOOD PRESSURE: 54 MMHG

## 2018-09-27 VITALS — SYSTOLIC BLOOD PRESSURE: 105 MMHG | DIASTOLIC BLOOD PRESSURE: 53 MMHG

## 2018-09-27 VITALS — DIASTOLIC BLOOD PRESSURE: 94 MMHG | SYSTOLIC BLOOD PRESSURE: 144 MMHG

## 2018-09-27 VITALS — DIASTOLIC BLOOD PRESSURE: 53 MMHG | SYSTOLIC BLOOD PRESSURE: 83 MMHG

## 2018-09-27 VITALS — DIASTOLIC BLOOD PRESSURE: 61 MMHG | SYSTOLIC BLOOD PRESSURE: 108 MMHG

## 2018-09-27 VITALS — DIASTOLIC BLOOD PRESSURE: 50 MMHG | SYSTOLIC BLOOD PRESSURE: 98 MMHG

## 2018-09-27 VITALS — SYSTOLIC BLOOD PRESSURE: 129 MMHG | DIASTOLIC BLOOD PRESSURE: 77 MMHG

## 2018-09-27 LAB
ANION GAP SERPL CALCULATED.3IONS-SCNC: 7 MMOL/L (ref 5–15)
APTT PPP: 30.5 SEC (ref 23.78–33.04)
BUN SERPL-MCNC: 27 MG/DL (ref 7–18)
BUN/CREAT SERPL: 51.9
CALCIUM SERPL-MCNC: 8.2 MG/DL (ref 8.5–10.1)
CHLORIDE SERPL-SCNC: 103 MMOL/L (ref 98–107)
CO2 SERPL-SCNC: 31 MMOL/L (ref 21–32)
GLUCOSE SERPL-MCNC: 73 MG/DL (ref 74–106)
HCT VFR BLD AUTO: 39.1 % (ref 36–46)
HGB BLD-MCNC: 12.7 G/DL (ref 12.2–16.2)
INR PPP: 1.19 (ref 0.9–1.15)
MCH RBC QN AUTO: 26.7 PG (ref 28–32)
MCV RBC AUTO: 82.1 FL (ref 80–100)
NRBC BLD QL AUTO: 0.1 %
POTASSIUM SERPL-SCNC: 3.9 MMOL/L (ref 3.5–5.1)
PROTHROMBIN TIME: 12.6 SEC (ref 9.27–12.13)
SODIUM SERPL-SCNC: 141 MMOL/L (ref 136–145)

## 2018-09-27 PROCEDURE — 5A2204Z RESTORATION OF CARDIAC RHYTHM, SINGLE: ICD-10-PCS | Performed by: INTERNAL MEDICINE

## 2018-09-27 RX ADMIN — ALBUTEROL SULFATE SCH MG: 2.5 SOLUTION RESPIRATORY (INHALATION) at 06:31

## 2018-09-27 RX ADMIN — DRONEDARONE SCH MG: 400 TABLET, FILM COATED ORAL at 10:28

## 2018-09-27 RX ADMIN — RANOLAZINE SCH MG: 500 TABLET, FILM COATED, EXTENDED RELEASE ORAL at 10:00

## 2018-09-27 RX ADMIN — MEROPENEM SCH MLS/HR: 1 INJECTION, POWDER, FOR SOLUTION INTRAVENOUS at 18:21

## 2018-09-27 RX ADMIN — PANTOPRAZOLE SODIUM SCH MG: 40 INJECTION, POWDER, FOR SOLUTION INTRAVENOUS at 10:28

## 2018-09-27 RX ADMIN — MEROPENEM SCH MLS/HR: 1 INJECTION, POWDER, FOR SOLUTION INTRAVENOUS at 01:18

## 2018-09-27 RX ADMIN — SACUBITRIL AND VALSARTAN SCH TAB: 24; 26 TABLET, FILM COATED ORAL at 22:00

## 2018-09-27 RX ADMIN — MEROPENEM SCH MLS/HR: 1 INJECTION, POWDER, FOR SOLUTION INTRAVENOUS at 09:00

## 2018-09-27 RX ADMIN — MIDAZOLAM SCH MLS/HR: 5 INJECTION, SOLUTION INTRAMUSCULAR; INTRAVENOUS at 05:35

## 2018-09-27 RX ADMIN — APIXABAN SCH MG: 5 TABLET, FILM COATED ORAL at 10:28

## 2018-09-27 RX ADMIN — IPRATROPIUM BROMIDE SCH MG: 0.5 SOLUTION RESPIRATORY (INHALATION) at 00:00

## 2018-09-27 RX ADMIN — SODIUM CHLORIDE SCH ML: 9 INJECTION INTRAMUSCULAR; INTRAVENOUS; SUBCUTANEOUS at 11:13

## 2018-09-27 RX ADMIN — RANOLAZINE SCH MG: 500 TABLET, FILM COATED, EXTENDED RELEASE ORAL at 22:00

## 2018-09-27 RX ADMIN — FUROSEMIDE SCH MG: 10 INJECTION, SOLUTION INTRAMUSCULAR; INTRAVENOUS at 05:49

## 2018-09-27 RX ADMIN — BUDESONIDE SCH MG: 0.5 SUSPENSION RESPIRATORY (INHALATION) at 18:03

## 2018-09-27 RX ADMIN — PROPOFOL SCH MLS/HR: 10 INJECTION, EMULSION INTRAVENOUS at 10:31

## 2018-09-27 RX ADMIN — SODIUM CHLORIDE SCH ML: 9 INJECTION INTRAMUSCULAR; INTRAVENOUS; SUBCUTANEOUS at 22:40

## 2018-09-27 RX ADMIN — IPRATROPIUM BROMIDE SCH MG: 0.5 SOLUTION RESPIRATORY (INHALATION) at 06:31

## 2018-09-27 RX ADMIN — ALBUTEROL SULFATE SCH MG: 2.5 SOLUTION RESPIRATORY (INHALATION) at 18:03

## 2018-09-27 RX ADMIN — NOREPINEPHRINE BITARTRATE SCH MLS/HR: 1 INJECTION, SOLUTION, CONCENTRATE INTRAVENOUS at 23:05

## 2018-09-27 RX ADMIN — ALBUTEROL SULFATE SCH MG: 2.5 SOLUTION RESPIRATORY (INHALATION) at 12:29

## 2018-09-27 RX ADMIN — SPIRONOLACTONE SCH MG: 25 TABLET, FILM COATED ORAL at 04:48

## 2018-09-27 RX ADMIN — BUDESONIDE SCH MG: 0.5 SUSPENSION RESPIRATORY (INHALATION) at 06:31

## 2018-09-27 RX ADMIN — ALBUTEROL SULFATE SCH MG: 2.5 SOLUTION RESPIRATORY (INHALATION) at 00:00

## 2018-09-27 RX ADMIN — Medication PRN ML: at 18:14

## 2018-09-27 RX ADMIN — MIDAZOLAM SCH MLS/HR: 5 INJECTION, SOLUTION INTRAMUSCULAR; INTRAVENOUS at 18:59

## 2018-09-27 RX ADMIN — IPRATROPIUM BROMIDE SCH MG: 0.5 SOLUTION RESPIRATORY (INHALATION) at 12:29

## 2018-09-27 RX ADMIN — IPRATROPIUM BROMIDE SCH MG: 0.5 SOLUTION RESPIRATORY (INHALATION) at 18:03

## 2018-09-27 RX ADMIN — DEXTROSE SCH MLS/HR: 50 INJECTION, SOLUTION INTRAVENOUS at 11:13

## 2018-09-27 RX ADMIN — FUROSEMIDE SCH MG: 10 INJECTION, SOLUTION INTRAMUSCULAR; INTRAVENOUS at 18:14

## 2018-09-27 RX ADMIN — DRONEDARONE SCH MG: 400 TABLET, FILM COATED ORAL at 22:41

## 2018-09-27 RX ADMIN — SACUBITRIL AND VALSARTAN SCH TAB: 24; 26 TABLET, FILM COATED ORAL at 10:00

## 2018-09-27 RX ADMIN — SPIRONOLACTONE SCH MG: 25 TABLET, FILM COATED ORAL at 18:21

## 2018-09-27 RX ADMIN — FENTANYL CITRATE SCH MLS/HR: 50 INJECTION, SOLUTION INTRAMUSCULAR; INTRAVENOUS at 02:38

## 2018-09-28 VITALS — SYSTOLIC BLOOD PRESSURE: 107 MMHG | DIASTOLIC BLOOD PRESSURE: 65 MMHG

## 2018-09-28 VITALS — SYSTOLIC BLOOD PRESSURE: 101 MMHG | DIASTOLIC BLOOD PRESSURE: 62 MMHG

## 2018-09-28 VITALS — DIASTOLIC BLOOD PRESSURE: 64 MMHG | SYSTOLIC BLOOD PRESSURE: 104 MMHG

## 2018-09-28 VITALS — SYSTOLIC BLOOD PRESSURE: 92 MMHG | DIASTOLIC BLOOD PRESSURE: 54 MMHG

## 2018-09-28 VITALS — DIASTOLIC BLOOD PRESSURE: 54 MMHG | SYSTOLIC BLOOD PRESSURE: 108 MMHG

## 2018-09-28 VITALS — DIASTOLIC BLOOD PRESSURE: 52 MMHG | SYSTOLIC BLOOD PRESSURE: 76 MMHG

## 2018-09-28 VITALS — DIASTOLIC BLOOD PRESSURE: 44 MMHG | SYSTOLIC BLOOD PRESSURE: 95 MMHG

## 2018-09-28 VITALS — DIASTOLIC BLOOD PRESSURE: 48 MMHG | SYSTOLIC BLOOD PRESSURE: 86 MMHG

## 2018-09-28 VITALS — DIASTOLIC BLOOD PRESSURE: 54 MMHG | SYSTOLIC BLOOD PRESSURE: 98 MMHG

## 2018-09-28 VITALS — SYSTOLIC BLOOD PRESSURE: 84 MMHG | DIASTOLIC BLOOD PRESSURE: 75 MMHG

## 2018-09-28 VITALS — DIASTOLIC BLOOD PRESSURE: 58 MMHG | SYSTOLIC BLOOD PRESSURE: 90 MMHG

## 2018-09-28 VITALS — SYSTOLIC BLOOD PRESSURE: 154 MMHG | DIASTOLIC BLOOD PRESSURE: 47 MMHG

## 2018-09-28 VITALS — DIASTOLIC BLOOD PRESSURE: 61 MMHG | SYSTOLIC BLOOD PRESSURE: 106 MMHG

## 2018-09-28 VITALS — SYSTOLIC BLOOD PRESSURE: 109 MMHG | DIASTOLIC BLOOD PRESSURE: 63 MMHG

## 2018-09-28 VITALS — SYSTOLIC BLOOD PRESSURE: 131 MMHG | DIASTOLIC BLOOD PRESSURE: 63 MMHG

## 2018-09-28 VITALS — DIASTOLIC BLOOD PRESSURE: 53 MMHG | SYSTOLIC BLOOD PRESSURE: 113 MMHG

## 2018-09-28 VITALS — DIASTOLIC BLOOD PRESSURE: 63 MMHG | SYSTOLIC BLOOD PRESSURE: 131 MMHG

## 2018-09-28 VITALS — DIASTOLIC BLOOD PRESSURE: 67 MMHG | SYSTOLIC BLOOD PRESSURE: 103 MMHG

## 2018-09-28 VITALS — DIASTOLIC BLOOD PRESSURE: 56 MMHG | SYSTOLIC BLOOD PRESSURE: 87 MMHG

## 2018-09-28 VITALS — DIASTOLIC BLOOD PRESSURE: 38 MMHG | SYSTOLIC BLOOD PRESSURE: 86 MMHG

## 2018-09-28 VITALS — DIASTOLIC BLOOD PRESSURE: 57 MMHG | SYSTOLIC BLOOD PRESSURE: 96 MMHG

## 2018-09-28 VITALS — SYSTOLIC BLOOD PRESSURE: 129 MMHG | DIASTOLIC BLOOD PRESSURE: 48 MMHG

## 2018-09-28 VITALS — DIASTOLIC BLOOD PRESSURE: 60 MMHG | SYSTOLIC BLOOD PRESSURE: 93 MMHG

## 2018-09-28 VITALS — DIASTOLIC BLOOD PRESSURE: 62 MMHG | SYSTOLIC BLOOD PRESSURE: 98 MMHG

## 2018-09-28 VITALS — DIASTOLIC BLOOD PRESSURE: 61 MMHG | SYSTOLIC BLOOD PRESSURE: 96 MMHG

## 2018-09-28 VITALS — DIASTOLIC BLOOD PRESSURE: 54 MMHG | SYSTOLIC BLOOD PRESSURE: 106 MMHG

## 2018-09-28 VITALS — DIASTOLIC BLOOD PRESSURE: 59 MMHG | SYSTOLIC BLOOD PRESSURE: 97 MMHG

## 2018-09-28 VITALS — DIASTOLIC BLOOD PRESSURE: 47 MMHG | SYSTOLIC BLOOD PRESSURE: 89 MMHG

## 2018-09-28 VITALS — SYSTOLIC BLOOD PRESSURE: 129 MMHG | DIASTOLIC BLOOD PRESSURE: 63 MMHG

## 2018-09-28 VITALS — SYSTOLIC BLOOD PRESSURE: 131 MMHG | DIASTOLIC BLOOD PRESSURE: 92 MMHG

## 2018-09-28 VITALS — SYSTOLIC BLOOD PRESSURE: 101 MMHG | DIASTOLIC BLOOD PRESSURE: 58 MMHG

## 2018-09-28 VITALS — DIASTOLIC BLOOD PRESSURE: 59 MMHG | SYSTOLIC BLOOD PRESSURE: 126 MMHG

## 2018-09-28 VITALS — DIASTOLIC BLOOD PRESSURE: 54 MMHG | SYSTOLIC BLOOD PRESSURE: 90 MMHG

## 2018-09-28 VITALS — SYSTOLIC BLOOD PRESSURE: 113 MMHG | DIASTOLIC BLOOD PRESSURE: 64 MMHG

## 2018-09-28 VITALS — DIASTOLIC BLOOD PRESSURE: 80 MMHG | SYSTOLIC BLOOD PRESSURE: 113 MMHG

## 2018-09-28 VITALS — DIASTOLIC BLOOD PRESSURE: 60 MMHG | SYSTOLIC BLOOD PRESSURE: 103 MMHG

## 2018-09-28 VITALS — SYSTOLIC BLOOD PRESSURE: 122 MMHG | DIASTOLIC BLOOD PRESSURE: 47 MMHG

## 2018-09-28 VITALS — DIASTOLIC BLOOD PRESSURE: 55 MMHG | SYSTOLIC BLOOD PRESSURE: 92 MMHG

## 2018-09-28 VITALS — SYSTOLIC BLOOD PRESSURE: 125 MMHG | DIASTOLIC BLOOD PRESSURE: 59 MMHG

## 2018-09-28 VITALS — SYSTOLIC BLOOD PRESSURE: 106 MMHG | DIASTOLIC BLOOD PRESSURE: 58 MMHG

## 2018-09-28 VITALS — SYSTOLIC BLOOD PRESSURE: 110 MMHG | DIASTOLIC BLOOD PRESSURE: 59 MMHG

## 2018-09-28 VITALS — DIASTOLIC BLOOD PRESSURE: 56 MMHG | SYSTOLIC BLOOD PRESSURE: 92 MMHG

## 2018-09-28 VITALS — DIASTOLIC BLOOD PRESSURE: 92 MMHG | SYSTOLIC BLOOD PRESSURE: 131 MMHG

## 2018-09-28 VITALS — SYSTOLIC BLOOD PRESSURE: 98 MMHG | DIASTOLIC BLOOD PRESSURE: 40 MMHG

## 2018-09-28 VITALS — DIASTOLIC BLOOD PRESSURE: 50 MMHG | SYSTOLIC BLOOD PRESSURE: 88 MMHG

## 2018-09-28 VITALS — DIASTOLIC BLOOD PRESSURE: 47 MMHG | SYSTOLIC BLOOD PRESSURE: 96 MMHG

## 2018-09-28 VITALS — SYSTOLIC BLOOD PRESSURE: 84 MMHG | DIASTOLIC BLOOD PRESSURE: 53 MMHG

## 2018-09-28 VITALS — DIASTOLIC BLOOD PRESSURE: 53 MMHG | SYSTOLIC BLOOD PRESSURE: 114 MMHG

## 2018-09-28 VITALS — SYSTOLIC BLOOD PRESSURE: 100 MMHG | DIASTOLIC BLOOD PRESSURE: 58 MMHG

## 2018-09-28 VITALS — SYSTOLIC BLOOD PRESSURE: 95 MMHG | DIASTOLIC BLOOD PRESSURE: 57 MMHG

## 2018-09-28 VITALS — SYSTOLIC BLOOD PRESSURE: 102 MMHG | DIASTOLIC BLOOD PRESSURE: 50 MMHG

## 2018-09-28 VITALS — SYSTOLIC BLOOD PRESSURE: 75 MMHG | DIASTOLIC BLOOD PRESSURE: 45 MMHG

## 2018-09-28 VITALS — SYSTOLIC BLOOD PRESSURE: 119 MMHG | DIASTOLIC BLOOD PRESSURE: 55 MMHG

## 2018-09-28 VITALS — DIASTOLIC BLOOD PRESSURE: 50 MMHG | SYSTOLIC BLOOD PRESSURE: 111 MMHG

## 2018-09-28 VITALS — DIASTOLIC BLOOD PRESSURE: 59 MMHG | SYSTOLIC BLOOD PRESSURE: 105 MMHG

## 2018-09-28 VITALS — SYSTOLIC BLOOD PRESSURE: 99 MMHG | DIASTOLIC BLOOD PRESSURE: 51 MMHG

## 2018-09-28 VITALS — SYSTOLIC BLOOD PRESSURE: 97 MMHG | DIASTOLIC BLOOD PRESSURE: 48 MMHG

## 2018-09-28 VITALS — DIASTOLIC BLOOD PRESSURE: 58 MMHG | SYSTOLIC BLOOD PRESSURE: 107 MMHG

## 2018-09-28 VITALS — DIASTOLIC BLOOD PRESSURE: 69 MMHG | SYSTOLIC BLOOD PRESSURE: 108 MMHG

## 2018-09-28 VITALS — DIASTOLIC BLOOD PRESSURE: 35 MMHG | SYSTOLIC BLOOD PRESSURE: 79 MMHG

## 2018-09-28 VITALS — SYSTOLIC BLOOD PRESSURE: 116 MMHG | DIASTOLIC BLOOD PRESSURE: 63 MMHG

## 2018-09-28 VITALS — SYSTOLIC BLOOD PRESSURE: 117 MMHG | DIASTOLIC BLOOD PRESSURE: 72 MMHG

## 2018-09-28 VITALS — SYSTOLIC BLOOD PRESSURE: 104 MMHG | DIASTOLIC BLOOD PRESSURE: 50 MMHG

## 2018-09-28 VITALS — SYSTOLIC BLOOD PRESSURE: 97 MMHG | DIASTOLIC BLOOD PRESSURE: 38 MMHG

## 2018-09-28 VITALS — SYSTOLIC BLOOD PRESSURE: 97 MMHG | DIASTOLIC BLOOD PRESSURE: 53 MMHG

## 2018-09-28 VITALS — SYSTOLIC BLOOD PRESSURE: 97 MMHG | DIASTOLIC BLOOD PRESSURE: 68 MMHG

## 2018-09-28 VITALS — SYSTOLIC BLOOD PRESSURE: 101 MMHG | DIASTOLIC BLOOD PRESSURE: 68 MMHG

## 2018-09-28 VITALS — DIASTOLIC BLOOD PRESSURE: 34 MMHG | SYSTOLIC BLOOD PRESSURE: 75 MMHG

## 2018-09-28 VITALS — SYSTOLIC BLOOD PRESSURE: 97 MMHG | DIASTOLIC BLOOD PRESSURE: 63 MMHG

## 2018-09-28 VITALS — DIASTOLIC BLOOD PRESSURE: 82 MMHG | SYSTOLIC BLOOD PRESSURE: 112 MMHG

## 2018-09-28 VITALS — SYSTOLIC BLOOD PRESSURE: 76 MMHG | DIASTOLIC BLOOD PRESSURE: 49 MMHG

## 2018-09-28 VITALS — DIASTOLIC BLOOD PRESSURE: 45 MMHG | SYSTOLIC BLOOD PRESSURE: 102 MMHG

## 2018-09-28 VITALS — DIASTOLIC BLOOD PRESSURE: 53 MMHG | SYSTOLIC BLOOD PRESSURE: 93 MMHG

## 2018-09-28 VITALS — SYSTOLIC BLOOD PRESSURE: 105 MMHG | DIASTOLIC BLOOD PRESSURE: 64 MMHG

## 2018-09-28 VITALS — SYSTOLIC BLOOD PRESSURE: 81 MMHG | DIASTOLIC BLOOD PRESSURE: 34 MMHG

## 2018-09-28 VITALS — DIASTOLIC BLOOD PRESSURE: 52 MMHG | SYSTOLIC BLOOD PRESSURE: 85 MMHG

## 2018-09-28 VITALS — DIASTOLIC BLOOD PRESSURE: 58 MMHG | SYSTOLIC BLOOD PRESSURE: 89 MMHG

## 2018-09-28 VITALS — DIASTOLIC BLOOD PRESSURE: 48 MMHG | SYSTOLIC BLOOD PRESSURE: 94 MMHG

## 2018-09-28 VITALS — SYSTOLIC BLOOD PRESSURE: 94 MMHG | DIASTOLIC BLOOD PRESSURE: 47 MMHG

## 2018-09-28 VITALS — DIASTOLIC BLOOD PRESSURE: 54 MMHG | SYSTOLIC BLOOD PRESSURE: 92 MMHG

## 2018-09-28 VITALS — SYSTOLIC BLOOD PRESSURE: 104 MMHG | DIASTOLIC BLOOD PRESSURE: 64 MMHG

## 2018-09-28 VITALS — DIASTOLIC BLOOD PRESSURE: 55 MMHG

## 2018-09-28 VITALS — DIASTOLIC BLOOD PRESSURE: 49 MMHG | SYSTOLIC BLOOD PRESSURE: 89 MMHG

## 2018-09-28 VITALS — SYSTOLIC BLOOD PRESSURE: 129 MMHG | DIASTOLIC BLOOD PRESSURE: 53 MMHG

## 2018-09-28 VITALS — DIASTOLIC BLOOD PRESSURE: 48 MMHG | SYSTOLIC BLOOD PRESSURE: 79 MMHG

## 2018-09-28 VITALS — DIASTOLIC BLOOD PRESSURE: 50 MMHG | SYSTOLIC BLOOD PRESSURE: 100 MMHG

## 2018-09-28 VITALS — SYSTOLIC BLOOD PRESSURE: 112 MMHG | DIASTOLIC BLOOD PRESSURE: 57 MMHG

## 2018-09-28 VITALS — DIASTOLIC BLOOD PRESSURE: 55 MMHG | SYSTOLIC BLOOD PRESSURE: 97 MMHG

## 2018-09-28 VITALS — SYSTOLIC BLOOD PRESSURE: 96 MMHG | DIASTOLIC BLOOD PRESSURE: 47 MMHG

## 2018-09-28 VITALS — SYSTOLIC BLOOD PRESSURE: 122 MMHG | DIASTOLIC BLOOD PRESSURE: 73 MMHG

## 2018-09-28 VITALS — SYSTOLIC BLOOD PRESSURE: 109 MMHG | DIASTOLIC BLOOD PRESSURE: 52 MMHG

## 2018-09-28 VITALS — DIASTOLIC BLOOD PRESSURE: 44 MMHG | SYSTOLIC BLOOD PRESSURE: 92 MMHG

## 2018-09-28 VITALS — DIASTOLIC BLOOD PRESSURE: 32 MMHG | SYSTOLIC BLOOD PRESSURE: 83 MMHG

## 2018-09-28 VITALS — DIASTOLIC BLOOD PRESSURE: 56 MMHG | SYSTOLIC BLOOD PRESSURE: 96 MMHG

## 2018-09-28 LAB
ANION GAP SERPL CALCULATED.3IONS-SCNC: 8 MMOL/L (ref 5–15)
APTT PPP: 32.4 SEC (ref 23.78–33.04)
BUN SERPL-MCNC: 26 MG/DL (ref 7–18)
BUN/CREAT SERPL: 37.1
CALCIUM SERPL-MCNC: 8 MG/DL (ref 8.5–10.1)
CHLORIDE SERPL-SCNC: 101 MMOL/L (ref 98–107)
CO2 SERPL-SCNC: 33 MMOL/L (ref 21–32)
GLUCOSE SERPL-MCNC: 91 MG/DL (ref 74–106)
HCT VFR BLD AUTO: 39.4 % (ref 36–46)
HGB BLD-MCNC: 12.7 G/DL (ref 12.2–16.2)
INR PPP: 1.13 (ref 0.9–1.15)
MAGNESIUM SERPL-MCNC: 2.4 MG/DL (ref 1.6–2.6)
MCH RBC QN AUTO: 26.5 PG (ref 28–32)
MCV RBC AUTO: 81.6 FL (ref 80–100)
NRBC BLD QL AUTO: 0 %
POTASSIUM SERPL-SCNC: 3.6 MMOL/L (ref 3.5–5.1)
PROTHROMBIN TIME: 12 SEC (ref 9.27–12.13)
SODIUM SERPL-SCNC: 142 MMOL/L (ref 136–145)

## 2018-09-28 PROCEDURE — 4A023N7 MEASUREMENT OF CARDIAC SAMPLING AND PRESSURE, LEFT HEART, PERCUTANEOUS APPROACH: ICD-10-PCS | Performed by: NURSE PRACTITIONER

## 2018-09-28 PROCEDURE — B2111ZZ FLUOROSCOPY OF MULTIPLE CORONARY ARTERIES USING LOW OSMOLAR CONTRAST: ICD-10-PCS | Performed by: INTERNAL MEDICINE

## 2018-09-28 PROCEDURE — 03JY3ZZ INSPECTION OF UPPER ARTERY, PERCUTANEOUS APPROACH: ICD-10-PCS | Performed by: INTERNAL MEDICINE

## 2018-09-28 PROCEDURE — B2151ZZ FLUOROSCOPY OF LEFT HEART USING LOW OSMOLAR CONTRAST: ICD-10-PCS | Performed by: NURSE PRACTITIONER

## 2018-09-28 RX ADMIN — SPIRONOLACTONE SCH MG: 25 TABLET, FILM COATED ORAL at 18:19

## 2018-09-28 RX ADMIN — MIDAZOLAM SCH MLS/HR: 5 INJECTION, SOLUTION INTRAMUSCULAR; INTRAVENOUS at 10:06

## 2018-09-28 RX ADMIN — ALBUTEROL SULFATE SCH MG: 2.5 SOLUTION RESPIRATORY (INHALATION) at 00:28

## 2018-09-28 RX ADMIN — METOCLOPRAMIDE SCH MG: 5 INJECTION, SOLUTION INTRAMUSCULAR; INTRAVENOUS at 22:08

## 2018-09-28 RX ADMIN — SODIUM CHLORIDE SCH ML: 9 INJECTION INTRAMUSCULAR; INTRAVENOUS; SUBCUTANEOUS at 22:08

## 2018-09-28 RX ADMIN — MIDAZOLAM SCH MLS/HR: 5 INJECTION, SOLUTION INTRAMUSCULAR; INTRAVENOUS at 19:00

## 2018-09-28 RX ADMIN — MEROPENEM SCH MLS/HR: 1 INJECTION, POWDER, FOR SOLUTION INTRAVENOUS at 01:26

## 2018-09-28 RX ADMIN — FUROSEMIDE SCH MG: 10 INJECTION, SOLUTION INTRAMUSCULAR; INTRAVENOUS at 05:47

## 2018-09-28 RX ADMIN — IPRATROPIUM BROMIDE SCH MG: 0.5 SOLUTION RESPIRATORY (INHALATION) at 11:55

## 2018-09-28 RX ADMIN — MEROPENEM SCH MLS/HR: 1 INJECTION, POWDER, FOR SOLUTION INTRAVENOUS at 10:06

## 2018-09-28 RX ADMIN — PROPOFOL SCH MLS/HR: 10 INJECTION, EMULSION INTRAVENOUS at 13:10

## 2018-09-28 RX ADMIN — BUDESONIDE SCH MG: 0.5 SUSPENSION RESPIRATORY (INHALATION) at 18:42

## 2018-09-28 RX ADMIN — DRONEDARONE SCH MG: 400 TABLET, FILM COATED ORAL at 10:05

## 2018-09-28 RX ADMIN — RANOLAZINE SCH MG: 500 TABLET, FILM COATED, EXTENDED RELEASE ORAL at 07:43

## 2018-09-28 RX ADMIN — IPRATROPIUM BROMIDE SCH MG: 0.5 SOLUTION RESPIRATORY (INHALATION) at 06:36

## 2018-09-28 RX ADMIN — MEROPENEM SCH MLS/HR: 1 INJECTION, POWDER, FOR SOLUTION INTRAVENOUS at 18:18

## 2018-09-28 RX ADMIN — MIDAZOLAM SCH MLS/HR: 5 INJECTION, SOLUTION INTRAMUSCULAR; INTRAVENOUS at 00:00

## 2018-09-28 RX ADMIN — NOREPINEPHRINE BITARTRATE SCH MLS/HR: 1 INJECTION, SOLUTION, CONCENTRATE INTRAVENOUS at 23:05

## 2018-09-28 RX ADMIN — BUDESONIDE SCH MG: 0.5 SUSPENSION RESPIRATORY (INHALATION) at 10:53

## 2018-09-28 RX ADMIN — PANTOPRAZOLE SODIUM SCH MG: 40 INJECTION, POWDER, FOR SOLUTION INTRAVENOUS at 10:05

## 2018-09-28 RX ADMIN — PROPOFOL SCH MLS/HR: 10 INJECTION, EMULSION INTRAVENOUS at 23:39

## 2018-09-28 RX ADMIN — METOCLOPRAMIDE SCH MG: 5 INJECTION, SOLUTION INTRAMUSCULAR; INTRAVENOUS at 13:09

## 2018-09-28 RX ADMIN — FENTANYL CITRATE SCH MLS/HR: 50 INJECTION, SOLUTION INTRAMUSCULAR; INTRAVENOUS at 10:25

## 2018-09-28 RX ADMIN — IPRATROPIUM BROMIDE SCH MG: 0.5 SOLUTION RESPIRATORY (INHALATION) at 00:28

## 2018-09-28 RX ADMIN — SPIRONOLACTONE SCH MG: 25 TABLET, FILM COATED ORAL at 05:48

## 2018-09-28 RX ADMIN — DEXTROSE SCH MLS/HR: 50 INJECTION, SOLUTION INTRAVENOUS at 09:42

## 2018-09-28 RX ADMIN — RANOLAZINE SCH MG: 500 TABLET, FILM COATED, EXTENDED RELEASE ORAL at 22:00

## 2018-09-28 RX ADMIN — SACUBITRIL AND VALSARTAN SCH TAB: 24; 26 TABLET, FILM COATED ORAL at 07:43

## 2018-09-28 RX ADMIN — PROPOFOL SCH MLS/HR: 10 INJECTION, EMULSION INTRAVENOUS at 02:00

## 2018-09-28 RX ADMIN — SODIUM CHLORIDE SCH ML: 9 INJECTION INTRAMUSCULAR; INTRAVENOUS; SUBCUTANEOUS at 09:39

## 2018-09-28 RX ADMIN — ALBUTEROL SULFATE SCH MG: 2.5 SOLUTION RESPIRATORY (INHALATION) at 06:36

## 2018-09-28 RX ADMIN — FUROSEMIDE SCH MG: 10 INJECTION, SOLUTION INTRAMUSCULAR; INTRAVENOUS at 18:19

## 2018-09-28 RX ADMIN — ALBUTEROL SULFATE SCH MG: 2.5 SOLUTION RESPIRATORY (INHALATION) at 11:55

## 2018-09-28 RX ADMIN — DRONEDARONE SCH MG: 400 TABLET, FILM COATED ORAL at 22:08

## 2018-09-28 RX ADMIN — SACUBITRIL AND VALSARTAN SCH TAB: 24; 26 TABLET, FILM COATED ORAL at 22:00

## 2018-09-28 RX ADMIN — IPRATROPIUM BROMIDE SCH MG: 0.5 SOLUTION RESPIRATORY (INHALATION) at 18:42

## 2018-09-28 RX ADMIN — ALBUTEROL SULFATE SCH MG: 2.5 SOLUTION RESPIRATORY (INHALATION) at 18:42

## 2018-09-29 VITALS — DIASTOLIC BLOOD PRESSURE: 58 MMHG | SYSTOLIC BLOOD PRESSURE: 101 MMHG

## 2018-09-29 VITALS — SYSTOLIC BLOOD PRESSURE: 105 MMHG | DIASTOLIC BLOOD PRESSURE: 59 MMHG

## 2018-09-29 VITALS — DIASTOLIC BLOOD PRESSURE: 51 MMHG | SYSTOLIC BLOOD PRESSURE: 109 MMHG

## 2018-09-29 VITALS — DIASTOLIC BLOOD PRESSURE: 58 MMHG | SYSTOLIC BLOOD PRESSURE: 109 MMHG

## 2018-09-29 VITALS — SYSTOLIC BLOOD PRESSURE: 84 MMHG | DIASTOLIC BLOOD PRESSURE: 52 MMHG

## 2018-09-29 VITALS — SYSTOLIC BLOOD PRESSURE: 110 MMHG | DIASTOLIC BLOOD PRESSURE: 53 MMHG

## 2018-09-29 VITALS — DIASTOLIC BLOOD PRESSURE: 59 MMHG | SYSTOLIC BLOOD PRESSURE: 122 MMHG

## 2018-09-29 VITALS — DIASTOLIC BLOOD PRESSURE: 59 MMHG | SYSTOLIC BLOOD PRESSURE: 194 MMHG

## 2018-09-29 VITALS — DIASTOLIC BLOOD PRESSURE: 51 MMHG | SYSTOLIC BLOOD PRESSURE: 108 MMHG

## 2018-09-29 VITALS — SYSTOLIC BLOOD PRESSURE: 96 MMHG | DIASTOLIC BLOOD PRESSURE: 54 MMHG

## 2018-09-29 VITALS — SYSTOLIC BLOOD PRESSURE: 98 MMHG | DIASTOLIC BLOOD PRESSURE: 57 MMHG

## 2018-09-29 VITALS — DIASTOLIC BLOOD PRESSURE: 51 MMHG | SYSTOLIC BLOOD PRESSURE: 115 MMHG

## 2018-09-29 VITALS — DIASTOLIC BLOOD PRESSURE: 53 MMHG | SYSTOLIC BLOOD PRESSURE: 129 MMHG

## 2018-09-29 VITALS — SYSTOLIC BLOOD PRESSURE: 97 MMHG | DIASTOLIC BLOOD PRESSURE: 54 MMHG

## 2018-09-29 VITALS — SYSTOLIC BLOOD PRESSURE: 110 MMHG | DIASTOLIC BLOOD PRESSURE: 51 MMHG

## 2018-09-29 VITALS — SYSTOLIC BLOOD PRESSURE: 112 MMHG | DIASTOLIC BLOOD PRESSURE: 73 MMHG

## 2018-09-29 VITALS — DIASTOLIC BLOOD PRESSURE: 55 MMHG | SYSTOLIC BLOOD PRESSURE: 91 MMHG

## 2018-09-29 VITALS — DIASTOLIC BLOOD PRESSURE: 58 MMHG | SYSTOLIC BLOOD PRESSURE: 105 MMHG

## 2018-09-29 VITALS — SYSTOLIC BLOOD PRESSURE: 98 MMHG | DIASTOLIC BLOOD PRESSURE: 61 MMHG

## 2018-09-29 VITALS — DIASTOLIC BLOOD PRESSURE: 57 MMHG | SYSTOLIC BLOOD PRESSURE: 127 MMHG

## 2018-09-29 VITALS — DIASTOLIC BLOOD PRESSURE: 65 MMHG | SYSTOLIC BLOOD PRESSURE: 120 MMHG

## 2018-09-29 VITALS — DIASTOLIC BLOOD PRESSURE: 63 MMHG | SYSTOLIC BLOOD PRESSURE: 114 MMHG

## 2018-09-29 VITALS — DIASTOLIC BLOOD PRESSURE: 57 MMHG | SYSTOLIC BLOOD PRESSURE: 98 MMHG

## 2018-09-29 VITALS — SYSTOLIC BLOOD PRESSURE: 102 MMHG | DIASTOLIC BLOOD PRESSURE: 51 MMHG

## 2018-09-29 VITALS — SYSTOLIC BLOOD PRESSURE: 91 MMHG | DIASTOLIC BLOOD PRESSURE: 48 MMHG

## 2018-09-29 VITALS — DIASTOLIC BLOOD PRESSURE: 64 MMHG | SYSTOLIC BLOOD PRESSURE: 133 MMHG

## 2018-09-29 VITALS — DIASTOLIC BLOOD PRESSURE: 54 MMHG | SYSTOLIC BLOOD PRESSURE: 96 MMHG

## 2018-09-29 VITALS — DIASTOLIC BLOOD PRESSURE: 69 MMHG | SYSTOLIC BLOOD PRESSURE: 98 MMHG

## 2018-09-29 VITALS — SYSTOLIC BLOOD PRESSURE: 91 MMHG | DIASTOLIC BLOOD PRESSURE: 57 MMHG

## 2018-09-29 VITALS — SYSTOLIC BLOOD PRESSURE: 109 MMHG | DIASTOLIC BLOOD PRESSURE: 56 MMHG

## 2018-09-29 VITALS — DIASTOLIC BLOOD PRESSURE: 59 MMHG | SYSTOLIC BLOOD PRESSURE: 89 MMHG

## 2018-09-29 VITALS — SYSTOLIC BLOOD PRESSURE: 129 MMHG | DIASTOLIC BLOOD PRESSURE: 36 MMHG

## 2018-09-29 VITALS — DIASTOLIC BLOOD PRESSURE: 53 MMHG | SYSTOLIC BLOOD PRESSURE: 96 MMHG

## 2018-09-29 VITALS — DIASTOLIC BLOOD PRESSURE: 54 MMHG | SYSTOLIC BLOOD PRESSURE: 97 MMHG

## 2018-09-29 VITALS — SYSTOLIC BLOOD PRESSURE: 96 MMHG | DIASTOLIC BLOOD PRESSURE: 59 MMHG

## 2018-09-29 VITALS — DIASTOLIC BLOOD PRESSURE: 59 MMHG | SYSTOLIC BLOOD PRESSURE: 112 MMHG

## 2018-09-29 VITALS — DIASTOLIC BLOOD PRESSURE: 45 MMHG | SYSTOLIC BLOOD PRESSURE: 102 MMHG

## 2018-09-29 VITALS — DIASTOLIC BLOOD PRESSURE: 55 MMHG | SYSTOLIC BLOOD PRESSURE: 110 MMHG

## 2018-09-29 VITALS — SYSTOLIC BLOOD PRESSURE: 108 MMHG | DIASTOLIC BLOOD PRESSURE: 51 MMHG

## 2018-09-29 VITALS — SYSTOLIC BLOOD PRESSURE: 115 MMHG | DIASTOLIC BLOOD PRESSURE: 66 MMHG

## 2018-09-29 VITALS — DIASTOLIC BLOOD PRESSURE: 58 MMHG | SYSTOLIC BLOOD PRESSURE: 132 MMHG

## 2018-09-29 VITALS — SYSTOLIC BLOOD PRESSURE: 96 MMHG | DIASTOLIC BLOOD PRESSURE: 55 MMHG

## 2018-09-29 VITALS — DIASTOLIC BLOOD PRESSURE: 54 MMHG | SYSTOLIC BLOOD PRESSURE: 110 MMHG

## 2018-09-29 VITALS — SYSTOLIC BLOOD PRESSURE: 104 MMHG | DIASTOLIC BLOOD PRESSURE: 52 MMHG

## 2018-09-29 VITALS — SYSTOLIC BLOOD PRESSURE: 116 MMHG | DIASTOLIC BLOOD PRESSURE: 55 MMHG

## 2018-09-29 VITALS — DIASTOLIC BLOOD PRESSURE: 56 MMHG | SYSTOLIC BLOOD PRESSURE: 107 MMHG

## 2018-09-29 VITALS — DIASTOLIC BLOOD PRESSURE: 55 MMHG | SYSTOLIC BLOOD PRESSURE: 113 MMHG

## 2018-09-29 VITALS — SYSTOLIC BLOOD PRESSURE: 110 MMHG | DIASTOLIC BLOOD PRESSURE: 55 MMHG

## 2018-09-29 VITALS — DIASTOLIC BLOOD PRESSURE: 56 MMHG | SYSTOLIC BLOOD PRESSURE: 118 MMHG

## 2018-09-29 VITALS — SYSTOLIC BLOOD PRESSURE: 89 MMHG | DIASTOLIC BLOOD PRESSURE: 58 MMHG

## 2018-09-29 VITALS — DIASTOLIC BLOOD PRESSURE: 52 MMHG | SYSTOLIC BLOOD PRESSURE: 106 MMHG

## 2018-09-29 VITALS — DIASTOLIC BLOOD PRESSURE: 57 MMHG | SYSTOLIC BLOOD PRESSURE: 96 MMHG

## 2018-09-29 VITALS — DIASTOLIC BLOOD PRESSURE: 55 MMHG | SYSTOLIC BLOOD PRESSURE: 83 MMHG

## 2018-09-29 VITALS — SYSTOLIC BLOOD PRESSURE: 106 MMHG | DIASTOLIC BLOOD PRESSURE: 59 MMHG

## 2018-09-29 VITALS — DIASTOLIC BLOOD PRESSURE: 67 MMHG | SYSTOLIC BLOOD PRESSURE: 132 MMHG

## 2018-09-29 VITALS — SYSTOLIC BLOOD PRESSURE: 96 MMHG | DIASTOLIC BLOOD PRESSURE: 41 MMHG

## 2018-09-29 VITALS — SYSTOLIC BLOOD PRESSURE: 109 MMHG | DIASTOLIC BLOOD PRESSURE: 61 MMHG

## 2018-09-29 VITALS — DIASTOLIC BLOOD PRESSURE: 52 MMHG | SYSTOLIC BLOOD PRESSURE: 105 MMHG

## 2018-09-29 VITALS — DIASTOLIC BLOOD PRESSURE: 51 MMHG | SYSTOLIC BLOOD PRESSURE: 110 MMHG

## 2018-09-29 VITALS — DIASTOLIC BLOOD PRESSURE: 61 MMHG | SYSTOLIC BLOOD PRESSURE: 124 MMHG

## 2018-09-29 VITALS — DIASTOLIC BLOOD PRESSURE: 61 MMHG | SYSTOLIC BLOOD PRESSURE: 95 MMHG

## 2018-09-29 VITALS — SYSTOLIC BLOOD PRESSURE: 116 MMHG | DIASTOLIC BLOOD PRESSURE: 57 MMHG

## 2018-09-29 VITALS — SYSTOLIC BLOOD PRESSURE: 123 MMHG | DIASTOLIC BLOOD PRESSURE: 54 MMHG

## 2018-09-29 VITALS — DIASTOLIC BLOOD PRESSURE: 52 MMHG | SYSTOLIC BLOOD PRESSURE: 96 MMHG

## 2018-09-29 VITALS — SYSTOLIC BLOOD PRESSURE: 95 MMHG | DIASTOLIC BLOOD PRESSURE: 56 MMHG

## 2018-09-29 VITALS — DIASTOLIC BLOOD PRESSURE: 53 MMHG | SYSTOLIC BLOOD PRESSURE: 110 MMHG

## 2018-09-29 VITALS — DIASTOLIC BLOOD PRESSURE: 62 MMHG | SYSTOLIC BLOOD PRESSURE: 91 MMHG

## 2018-09-29 VITALS — SYSTOLIC BLOOD PRESSURE: 106 MMHG | DIASTOLIC BLOOD PRESSURE: 55 MMHG

## 2018-09-29 VITALS — SYSTOLIC BLOOD PRESSURE: 128 MMHG | DIASTOLIC BLOOD PRESSURE: 62 MMHG

## 2018-09-29 VITALS — DIASTOLIC BLOOD PRESSURE: 55 MMHG | SYSTOLIC BLOOD PRESSURE: 114 MMHG

## 2018-09-29 VITALS — SYSTOLIC BLOOD PRESSURE: 90 MMHG | DIASTOLIC BLOOD PRESSURE: 52 MMHG

## 2018-09-29 VITALS — DIASTOLIC BLOOD PRESSURE: 54 MMHG | SYSTOLIC BLOOD PRESSURE: 107 MMHG

## 2018-09-29 VITALS — DIASTOLIC BLOOD PRESSURE: 63 MMHG | SYSTOLIC BLOOD PRESSURE: 134 MMHG

## 2018-09-29 VITALS — SYSTOLIC BLOOD PRESSURE: 114 MMHG | DIASTOLIC BLOOD PRESSURE: 56 MMHG

## 2018-09-29 VITALS — SYSTOLIC BLOOD PRESSURE: 101 MMHG | DIASTOLIC BLOOD PRESSURE: 58 MMHG

## 2018-09-29 VITALS — DIASTOLIC BLOOD PRESSURE: 53 MMHG | SYSTOLIC BLOOD PRESSURE: 113 MMHG

## 2018-09-29 VITALS — SYSTOLIC BLOOD PRESSURE: 126 MMHG | DIASTOLIC BLOOD PRESSURE: 58 MMHG

## 2018-09-29 VITALS — DIASTOLIC BLOOD PRESSURE: 64 MMHG | SYSTOLIC BLOOD PRESSURE: 116 MMHG

## 2018-09-29 VITALS — DIASTOLIC BLOOD PRESSURE: 57 MMHG | SYSTOLIC BLOOD PRESSURE: 115 MMHG

## 2018-09-29 VITALS — SYSTOLIC BLOOD PRESSURE: 114 MMHG | DIASTOLIC BLOOD PRESSURE: 82 MMHG

## 2018-09-29 VITALS — DIASTOLIC BLOOD PRESSURE: 56 MMHG | SYSTOLIC BLOOD PRESSURE: 112 MMHG

## 2018-09-29 VITALS — DIASTOLIC BLOOD PRESSURE: 57 MMHG | SYSTOLIC BLOOD PRESSURE: 99 MMHG

## 2018-09-29 VITALS — SYSTOLIC BLOOD PRESSURE: 111 MMHG | DIASTOLIC BLOOD PRESSURE: 54 MMHG

## 2018-09-29 VITALS — SYSTOLIC BLOOD PRESSURE: 113 MMHG | DIASTOLIC BLOOD PRESSURE: 55 MMHG

## 2018-09-29 VITALS — SYSTOLIC BLOOD PRESSURE: 131 MMHG | DIASTOLIC BLOOD PRESSURE: 65 MMHG

## 2018-09-29 VITALS — DIASTOLIC BLOOD PRESSURE: 56 MMHG | SYSTOLIC BLOOD PRESSURE: 96 MMHG

## 2018-09-29 VITALS — DIASTOLIC BLOOD PRESSURE: 43 MMHG | SYSTOLIC BLOOD PRESSURE: 100 MMHG

## 2018-09-29 LAB
ANION GAP SERPL CALCULATED.3IONS-SCNC: 4 MMOL/L (ref 5–15)
BUN SERPL-MCNC: 23 MG/DL (ref 7–18)
BUN/CREAT SERPL: 41.8
CALCIUM SERPL-MCNC: 8.2 MG/DL (ref 8.5–10.1)
CHLORIDE SERPL-SCNC: 103 MMOL/L (ref 98–107)
CO2 SERPL-SCNC: 32 MMOL/L (ref 21–32)
GLUCOSE SERPL-MCNC: 122 MG/DL (ref 74–106)
HCT VFR BLD AUTO: 37.9 % (ref 36–46)
HGB BLD-MCNC: 12.4 G/DL (ref 12.2–16.2)
MCH RBC QN AUTO: 26.3 PG (ref 28–32)
MCV RBC AUTO: 80.3 FL (ref 80–100)
NRBC BLD QL AUTO: 0.1 %
POTASSIUM SERPL-SCNC: 3.8 MMOL/L (ref 3.5–5.1)
SODIUM SERPL-SCNC: 139 MMOL/L (ref 136–145)

## 2018-09-29 RX ADMIN — IPRATROPIUM BROMIDE SCH MG: 0.5 SOLUTION RESPIRATORY (INHALATION) at 00:09

## 2018-09-29 RX ADMIN — DEXTROSE SCH MLS/HR: 50 INJECTION, SOLUTION INTRAVENOUS at 09:42

## 2018-09-29 RX ADMIN — MEROPENEM SCH MLS/HR: 1 INJECTION, POWDER, FOR SOLUTION INTRAVENOUS at 09:46

## 2018-09-29 RX ADMIN — IPRATROPIUM BROMIDE SCH MG: 0.5 SOLUTION RESPIRATORY (INHALATION) at 11:35

## 2018-09-29 RX ADMIN — ALBUTEROL SULFATE SCH MG: 2.5 SOLUTION RESPIRATORY (INHALATION) at 05:50

## 2018-09-29 RX ADMIN — FENTANYL CITRATE SCH MLS/HR: 50 INJECTION, SOLUTION INTRAMUSCULAR; INTRAVENOUS at 10:25

## 2018-09-29 RX ADMIN — FUROSEMIDE SCH MG: 10 INJECTION, SOLUTION INTRAMUSCULAR; INTRAVENOUS at 17:44

## 2018-09-29 RX ADMIN — METOCLOPRAMIDE SCH MG: 5 INJECTION, SOLUTION INTRAMUSCULAR; INTRAVENOUS at 21:24

## 2018-09-29 RX ADMIN — FUROSEMIDE SCH MG: 10 INJECTION, SOLUTION INTRAMUSCULAR; INTRAVENOUS at 06:08

## 2018-09-29 RX ADMIN — SACUBITRIL AND VALSARTAN SCH TAB: 24; 26 TABLET, FILM COATED ORAL at 09:46

## 2018-09-29 RX ADMIN — IPRATROPIUM BROMIDE SCH MG: 0.5 SOLUTION RESPIRATORY (INHALATION) at 05:50

## 2018-09-29 RX ADMIN — MIDAZOLAM SCH MLS/HR: 5 INJECTION, SOLUTION INTRAMUSCULAR; INTRAVENOUS at 17:44

## 2018-09-29 RX ADMIN — PANTOPRAZOLE SODIUM SCH MG: 40 INJECTION, POWDER, FOR SOLUTION INTRAVENOUS at 10:29

## 2018-09-29 RX ADMIN — BUDESONIDE SCH MG: 0.5 SUSPENSION RESPIRATORY (INHALATION) at 05:50

## 2018-09-29 RX ADMIN — ALBUTEROL SULFATE SCH MG: 2.5 SOLUTION RESPIRATORY (INHALATION) at 00:09

## 2018-09-29 RX ADMIN — DRONEDARONE SCH MG: 400 TABLET, FILM COATED ORAL at 21:24

## 2018-09-29 RX ADMIN — SACUBITRIL AND VALSARTAN SCH TAB: 24; 26 TABLET, FILM COATED ORAL at 21:07

## 2018-09-29 RX ADMIN — ALBUTEROL SULFATE SCH MG: 2.5 SOLUTION RESPIRATORY (INHALATION) at 18:44

## 2018-09-29 RX ADMIN — SPIRONOLACTONE SCH MG: 25 TABLET, FILM COATED ORAL at 06:00

## 2018-09-29 RX ADMIN — DRONEDARONE SCH MG: 400 TABLET, FILM COATED ORAL at 09:46

## 2018-09-29 RX ADMIN — PROPOFOL SCH MLS/HR: 10 INJECTION, EMULSION INTRAVENOUS at 16:03

## 2018-09-29 RX ADMIN — RANOLAZINE SCH MG: 500 TABLET, FILM COATED, EXTENDED RELEASE ORAL at 09:46

## 2018-09-29 RX ADMIN — IPRATROPIUM BROMIDE SCH MG: 0.5 SOLUTION RESPIRATORY (INHALATION) at 18:44

## 2018-09-29 RX ADMIN — METOCLOPRAMIDE SCH MG: 5 INJECTION, SOLUTION INTRAMUSCULAR; INTRAVENOUS at 13:48

## 2018-09-29 RX ADMIN — SODIUM CHLORIDE SCH ML: 9 INJECTION INTRAMUSCULAR; INTRAVENOUS; SUBCUTANEOUS at 21:25

## 2018-09-29 RX ADMIN — MEROPENEM SCH MLS/HR: 1 INJECTION, POWDER, FOR SOLUTION INTRAVENOUS at 17:09

## 2018-09-29 RX ADMIN — SPIRONOLACTONE SCH MG: 25 TABLET, FILM COATED ORAL at 17:44

## 2018-09-29 RX ADMIN — RANOLAZINE SCH MG: 500 TABLET, FILM COATED, EXTENDED RELEASE ORAL at 21:07

## 2018-09-29 RX ADMIN — SODIUM CHLORIDE SCH ML: 9 INJECTION INTRAMUSCULAR; INTRAVENOUS; SUBCUTANEOUS at 09:46

## 2018-09-29 RX ADMIN — NOREPINEPHRINE BITARTRATE SCH MLS/HR: 1 INJECTION, SOLUTION, CONCENTRATE INTRAVENOUS at 16:03

## 2018-09-29 RX ADMIN — ALBUTEROL SULFATE SCH MG: 2.5 SOLUTION RESPIRATORY (INHALATION) at 11:35

## 2018-09-29 RX ADMIN — MEROPENEM SCH MLS/HR: 1 INJECTION, POWDER, FOR SOLUTION INTRAVENOUS at 01:00

## 2018-09-29 RX ADMIN — MIDAZOLAM SCH MLS/HR: 5 INJECTION, SOLUTION INTRAMUSCULAR; INTRAVENOUS at 07:00

## 2018-09-29 RX ADMIN — APIXABAN SCH MG: 5 TABLET, FILM COATED ORAL at 21:24

## 2018-09-29 RX ADMIN — METOCLOPRAMIDE SCH MG: 5 INJECTION, SOLUTION INTRAMUSCULAR; INTRAVENOUS at 06:00

## 2018-09-29 RX ADMIN — Medication SCH ML: at 16:04

## 2018-09-30 VITALS — DIASTOLIC BLOOD PRESSURE: 58 MMHG | SYSTOLIC BLOOD PRESSURE: 84 MMHG

## 2018-09-30 VITALS — DIASTOLIC BLOOD PRESSURE: 54 MMHG | SYSTOLIC BLOOD PRESSURE: 124 MMHG

## 2018-09-30 VITALS — DIASTOLIC BLOOD PRESSURE: 54 MMHG | SYSTOLIC BLOOD PRESSURE: 88 MMHG

## 2018-09-30 VITALS — DIASTOLIC BLOOD PRESSURE: 57 MMHG | SYSTOLIC BLOOD PRESSURE: 101 MMHG

## 2018-09-30 VITALS — SYSTOLIC BLOOD PRESSURE: 114 MMHG | DIASTOLIC BLOOD PRESSURE: 59 MMHG

## 2018-09-30 VITALS — SYSTOLIC BLOOD PRESSURE: 104 MMHG | DIASTOLIC BLOOD PRESSURE: 46 MMHG

## 2018-09-30 VITALS — SYSTOLIC BLOOD PRESSURE: 103 MMHG | DIASTOLIC BLOOD PRESSURE: 55 MMHG

## 2018-09-30 VITALS — SYSTOLIC BLOOD PRESSURE: 87 MMHG | DIASTOLIC BLOOD PRESSURE: 56 MMHG

## 2018-09-30 VITALS — DIASTOLIC BLOOD PRESSURE: 52 MMHG | SYSTOLIC BLOOD PRESSURE: 83 MMHG

## 2018-09-30 VITALS — SYSTOLIC BLOOD PRESSURE: 87 MMHG | DIASTOLIC BLOOD PRESSURE: 62 MMHG

## 2018-09-30 VITALS — DIASTOLIC BLOOD PRESSURE: 53 MMHG | SYSTOLIC BLOOD PRESSURE: 111 MMHG

## 2018-09-30 VITALS — DIASTOLIC BLOOD PRESSURE: 56 MMHG | SYSTOLIC BLOOD PRESSURE: 122 MMHG

## 2018-09-30 VITALS — DIASTOLIC BLOOD PRESSURE: 56 MMHG | SYSTOLIC BLOOD PRESSURE: 85 MMHG

## 2018-09-30 VITALS — DIASTOLIC BLOOD PRESSURE: 57 MMHG | SYSTOLIC BLOOD PRESSURE: 111 MMHG

## 2018-09-30 VITALS — SYSTOLIC BLOOD PRESSURE: 111 MMHG | DIASTOLIC BLOOD PRESSURE: 54 MMHG

## 2018-09-30 VITALS — SYSTOLIC BLOOD PRESSURE: 129 MMHG | DIASTOLIC BLOOD PRESSURE: 62 MMHG

## 2018-09-30 VITALS — DIASTOLIC BLOOD PRESSURE: 57 MMHG | SYSTOLIC BLOOD PRESSURE: 84 MMHG

## 2018-09-30 VITALS — SYSTOLIC BLOOD PRESSURE: 137 MMHG | DIASTOLIC BLOOD PRESSURE: 63 MMHG

## 2018-09-30 VITALS — DIASTOLIC BLOOD PRESSURE: 55 MMHG | SYSTOLIC BLOOD PRESSURE: 122 MMHG

## 2018-09-30 VITALS — SYSTOLIC BLOOD PRESSURE: 104 MMHG | DIASTOLIC BLOOD PRESSURE: 54 MMHG

## 2018-09-30 VITALS — SYSTOLIC BLOOD PRESSURE: 96 MMHG | DIASTOLIC BLOOD PRESSURE: 58 MMHG

## 2018-09-30 VITALS — SYSTOLIC BLOOD PRESSURE: 94 MMHG | DIASTOLIC BLOOD PRESSURE: 53 MMHG

## 2018-09-30 VITALS — DIASTOLIC BLOOD PRESSURE: 53 MMHG | SYSTOLIC BLOOD PRESSURE: 92 MMHG

## 2018-09-30 VITALS — DIASTOLIC BLOOD PRESSURE: 61 MMHG | SYSTOLIC BLOOD PRESSURE: 129 MMHG

## 2018-09-30 VITALS — DIASTOLIC BLOOD PRESSURE: 54 MMHG | SYSTOLIC BLOOD PRESSURE: 106 MMHG

## 2018-09-30 VITALS — DIASTOLIC BLOOD PRESSURE: 56 MMHG | SYSTOLIC BLOOD PRESSURE: 94 MMHG

## 2018-09-30 VITALS — SYSTOLIC BLOOD PRESSURE: 96 MMHG | DIASTOLIC BLOOD PRESSURE: 59 MMHG

## 2018-09-30 VITALS — DIASTOLIC BLOOD PRESSURE: 52 MMHG | SYSTOLIC BLOOD PRESSURE: 111 MMHG

## 2018-09-30 VITALS — SYSTOLIC BLOOD PRESSURE: 84 MMHG | DIASTOLIC BLOOD PRESSURE: 58 MMHG

## 2018-09-30 VITALS — DIASTOLIC BLOOD PRESSURE: 56 MMHG | SYSTOLIC BLOOD PRESSURE: 104 MMHG

## 2018-09-30 VITALS — DIASTOLIC BLOOD PRESSURE: 53 MMHG | SYSTOLIC BLOOD PRESSURE: 102 MMHG

## 2018-09-30 VITALS — SYSTOLIC BLOOD PRESSURE: 106 MMHG | DIASTOLIC BLOOD PRESSURE: 53 MMHG

## 2018-09-30 VITALS — SYSTOLIC BLOOD PRESSURE: 108 MMHG | DIASTOLIC BLOOD PRESSURE: 47 MMHG

## 2018-09-30 VITALS — DIASTOLIC BLOOD PRESSURE: 58 MMHG | SYSTOLIC BLOOD PRESSURE: 104 MMHG

## 2018-09-30 VITALS — SYSTOLIC BLOOD PRESSURE: 104 MMHG | DIASTOLIC BLOOD PRESSURE: 55 MMHG

## 2018-09-30 VITALS — DIASTOLIC BLOOD PRESSURE: 59 MMHG | SYSTOLIC BLOOD PRESSURE: 103 MMHG

## 2018-09-30 VITALS — SYSTOLIC BLOOD PRESSURE: 88 MMHG | DIASTOLIC BLOOD PRESSURE: 54 MMHG

## 2018-09-30 VITALS — SYSTOLIC BLOOD PRESSURE: 107 MMHG | DIASTOLIC BLOOD PRESSURE: 55 MMHG

## 2018-09-30 VITALS — DIASTOLIC BLOOD PRESSURE: 52 MMHG | SYSTOLIC BLOOD PRESSURE: 114 MMHG

## 2018-09-30 VITALS — DIASTOLIC BLOOD PRESSURE: 53 MMHG | SYSTOLIC BLOOD PRESSURE: 116 MMHG

## 2018-09-30 VITALS — DIASTOLIC BLOOD PRESSURE: 64 MMHG | SYSTOLIC BLOOD PRESSURE: 138 MMHG

## 2018-09-30 VITALS — SYSTOLIC BLOOD PRESSURE: 106 MMHG | DIASTOLIC BLOOD PRESSURE: 52 MMHG

## 2018-09-30 VITALS — SYSTOLIC BLOOD PRESSURE: 106 MMHG | DIASTOLIC BLOOD PRESSURE: 56 MMHG

## 2018-09-30 VITALS — DIASTOLIC BLOOD PRESSURE: 61 MMHG | SYSTOLIC BLOOD PRESSURE: 127 MMHG

## 2018-09-30 VITALS — DIASTOLIC BLOOD PRESSURE: 55 MMHG | SYSTOLIC BLOOD PRESSURE: 110 MMHG

## 2018-09-30 VITALS — DIASTOLIC BLOOD PRESSURE: 65 MMHG | SYSTOLIC BLOOD PRESSURE: 141 MMHG

## 2018-09-30 VITALS — DIASTOLIC BLOOD PRESSURE: 61 MMHG | SYSTOLIC BLOOD PRESSURE: 120 MMHG

## 2018-09-30 VITALS — SYSTOLIC BLOOD PRESSURE: 107 MMHG | DIASTOLIC BLOOD PRESSURE: 53 MMHG

## 2018-09-30 VITALS — SYSTOLIC BLOOD PRESSURE: 83 MMHG | DIASTOLIC BLOOD PRESSURE: 52 MMHG

## 2018-09-30 VITALS — DIASTOLIC BLOOD PRESSURE: 57 MMHG | SYSTOLIC BLOOD PRESSURE: 87 MMHG

## 2018-09-30 VITALS — SYSTOLIC BLOOD PRESSURE: 125 MMHG | DIASTOLIC BLOOD PRESSURE: 60 MMHG

## 2018-09-30 VITALS — DIASTOLIC BLOOD PRESSURE: 53 MMHG | SYSTOLIC BLOOD PRESSURE: 83 MMHG

## 2018-09-30 VITALS — SYSTOLIC BLOOD PRESSURE: 110 MMHG | DIASTOLIC BLOOD PRESSURE: 53 MMHG

## 2018-09-30 VITALS — SYSTOLIC BLOOD PRESSURE: 124 MMHG | DIASTOLIC BLOOD PRESSURE: 61 MMHG

## 2018-09-30 VITALS — SYSTOLIC BLOOD PRESSURE: 92 MMHG | DIASTOLIC BLOOD PRESSURE: 53 MMHG

## 2018-09-30 VITALS — SYSTOLIC BLOOD PRESSURE: 105 MMHG | DIASTOLIC BLOOD PRESSURE: 54 MMHG

## 2018-09-30 VITALS — SYSTOLIC BLOOD PRESSURE: 122 MMHG | DIASTOLIC BLOOD PRESSURE: 57 MMHG

## 2018-09-30 VITALS — DIASTOLIC BLOOD PRESSURE: 50 MMHG | SYSTOLIC BLOOD PRESSURE: 96 MMHG

## 2018-09-30 VITALS — SYSTOLIC BLOOD PRESSURE: 105 MMHG | DIASTOLIC BLOOD PRESSURE: 49 MMHG

## 2018-09-30 VITALS — SYSTOLIC BLOOD PRESSURE: 87 MMHG | DIASTOLIC BLOOD PRESSURE: 52 MMHG

## 2018-09-30 VITALS — DIASTOLIC BLOOD PRESSURE: 51 MMHG | SYSTOLIC BLOOD PRESSURE: 98 MMHG

## 2018-09-30 VITALS — SYSTOLIC BLOOD PRESSURE: 108 MMHG | DIASTOLIC BLOOD PRESSURE: 60 MMHG

## 2018-09-30 VITALS — SYSTOLIC BLOOD PRESSURE: 106 MMHG | DIASTOLIC BLOOD PRESSURE: 59 MMHG

## 2018-09-30 VITALS — SYSTOLIC BLOOD PRESSURE: 101 MMHG | DIASTOLIC BLOOD PRESSURE: 57 MMHG

## 2018-09-30 VITALS — SYSTOLIC BLOOD PRESSURE: 88 MMHG | DIASTOLIC BLOOD PRESSURE: 55 MMHG

## 2018-09-30 VITALS — SYSTOLIC BLOOD PRESSURE: 110 MMHG | DIASTOLIC BLOOD PRESSURE: 54 MMHG

## 2018-09-30 VITALS — SYSTOLIC BLOOD PRESSURE: 117 MMHG | DIASTOLIC BLOOD PRESSURE: 58 MMHG

## 2018-09-30 VITALS — DIASTOLIC BLOOD PRESSURE: 50 MMHG | SYSTOLIC BLOOD PRESSURE: 100 MMHG

## 2018-09-30 VITALS — DIASTOLIC BLOOD PRESSURE: 52 MMHG | SYSTOLIC BLOOD PRESSURE: 87 MMHG

## 2018-09-30 VITALS — SYSTOLIC BLOOD PRESSURE: 111 MMHG | DIASTOLIC BLOOD PRESSURE: 59 MMHG

## 2018-09-30 VITALS — SYSTOLIC BLOOD PRESSURE: 90 MMHG | DIASTOLIC BLOOD PRESSURE: 56 MMHG

## 2018-09-30 VITALS — SYSTOLIC BLOOD PRESSURE: 106 MMHG | DIASTOLIC BLOOD PRESSURE: 54 MMHG

## 2018-09-30 VITALS — DIASTOLIC BLOOD PRESSURE: 50 MMHG | SYSTOLIC BLOOD PRESSURE: 107 MMHG

## 2018-09-30 VITALS — DIASTOLIC BLOOD PRESSURE: 64 MMHG | SYSTOLIC BLOOD PRESSURE: 106 MMHG

## 2018-09-30 VITALS — SYSTOLIC BLOOD PRESSURE: 84 MMHG | DIASTOLIC BLOOD PRESSURE: 59 MMHG

## 2018-09-30 VITALS — DIASTOLIC BLOOD PRESSURE: 48 MMHG | SYSTOLIC BLOOD PRESSURE: 102 MMHG

## 2018-09-30 VITALS — DIASTOLIC BLOOD PRESSURE: 54 MMHG | SYSTOLIC BLOOD PRESSURE: 98 MMHG

## 2018-09-30 VITALS — SYSTOLIC BLOOD PRESSURE: 101 MMHG | DIASTOLIC BLOOD PRESSURE: 51 MMHG

## 2018-09-30 VITALS — DIASTOLIC BLOOD PRESSURE: 58 MMHG | SYSTOLIC BLOOD PRESSURE: 92 MMHG

## 2018-09-30 VITALS — DIASTOLIC BLOOD PRESSURE: 57 MMHG | SYSTOLIC BLOOD PRESSURE: 126 MMHG

## 2018-09-30 VITALS — DIASTOLIC BLOOD PRESSURE: 49 MMHG | SYSTOLIC BLOOD PRESSURE: 111 MMHG

## 2018-09-30 VITALS — DIASTOLIC BLOOD PRESSURE: 55 MMHG | SYSTOLIC BLOOD PRESSURE: 113 MMHG

## 2018-09-30 VITALS — SYSTOLIC BLOOD PRESSURE: 104 MMHG | DIASTOLIC BLOOD PRESSURE: 59 MMHG

## 2018-09-30 VITALS — DIASTOLIC BLOOD PRESSURE: 61 MMHG | SYSTOLIC BLOOD PRESSURE: 107 MMHG

## 2018-09-30 VITALS — DIASTOLIC BLOOD PRESSURE: 54 MMHG | SYSTOLIC BLOOD PRESSURE: 122 MMHG

## 2018-09-30 VITALS — SYSTOLIC BLOOD PRESSURE: 79 MMHG | DIASTOLIC BLOOD PRESSURE: 50 MMHG

## 2018-09-30 VITALS — SYSTOLIC BLOOD PRESSURE: 102 MMHG | DIASTOLIC BLOOD PRESSURE: 53 MMHG

## 2018-09-30 VITALS — SYSTOLIC BLOOD PRESSURE: 98 MMHG | DIASTOLIC BLOOD PRESSURE: 50 MMHG

## 2018-09-30 VITALS — SYSTOLIC BLOOD PRESSURE: 105 MMHG | DIASTOLIC BLOOD PRESSURE: 56 MMHG

## 2018-09-30 VITALS — SYSTOLIC BLOOD PRESSURE: 120 MMHG | DIASTOLIC BLOOD PRESSURE: 57 MMHG

## 2018-09-30 VITALS — SYSTOLIC BLOOD PRESSURE: 108 MMHG | DIASTOLIC BLOOD PRESSURE: 52 MMHG

## 2018-09-30 LAB
ANION GAP SERPL CALCULATED.3IONS-SCNC: 7 MMOL/L (ref 5–15)
BUN SERPL-MCNC: 22 MG/DL (ref 7–18)
BUN/CREAT SERPL: 30.6
CALCIUM SERPL-MCNC: 7.7 MG/DL (ref 8.5–10.1)
CHLORIDE SERPL-SCNC: 103 MMOL/L (ref 98–107)
CO2 SERPL-SCNC: 32 MMOL/L (ref 21–32)
GLUCOSE SERPL-MCNC: 126 MG/DL (ref 74–106)
POTASSIUM SERPL-SCNC: 3.5 MMOL/L (ref 3.5–5.1)
SODIUM SERPL-SCNC: 142 MMOL/L (ref 136–145)

## 2018-09-30 RX ADMIN — FENTANYL CITRATE SCH MLS/HR: 50 INJECTION, SOLUTION INTRAMUSCULAR; INTRAVENOUS at 08:48

## 2018-09-30 RX ADMIN — IPRATROPIUM BROMIDE SCH MG: 0.5 SOLUTION RESPIRATORY (INHALATION) at 00:58

## 2018-09-30 RX ADMIN — NOREPINEPHRINE BITARTRATE SCH MLS/HR: 1 INJECTION, SOLUTION, CONCENTRATE INTRAVENOUS at 23:05

## 2018-09-30 RX ADMIN — RANOLAZINE SCH MG: 500 TABLET, FILM COATED, EXTENDED RELEASE ORAL at 22:00

## 2018-09-30 RX ADMIN — IPRATROPIUM BROMIDE SCH MG: 0.5 SOLUTION RESPIRATORY (INHALATION) at 06:59

## 2018-09-30 RX ADMIN — FUROSEMIDE SCH MG: 10 INJECTION, SOLUTION INTRAMUSCULAR; INTRAVENOUS at 17:58

## 2018-09-30 RX ADMIN — ALBUTEROL SULFATE SCH MG: 2.5 SOLUTION RESPIRATORY (INHALATION) at 00:58

## 2018-09-30 RX ADMIN — PROPOFOL SCH MLS/HR: 10 INJECTION, EMULSION INTRAVENOUS at 16:39

## 2018-09-30 RX ADMIN — FENTANYL CITRATE SCH MLS/HR: 50 INJECTION, SOLUTION INTRAMUSCULAR; INTRAVENOUS at 10:00

## 2018-09-30 RX ADMIN — ALBUTEROL SULFATE SCH MG: 2.5 SOLUTION RESPIRATORY (INHALATION) at 06:59

## 2018-09-30 RX ADMIN — APIXABAN SCH MG: 5 TABLET, FILM COATED ORAL at 22:07

## 2018-09-30 RX ADMIN — SACUBITRIL AND VALSARTAN SCH TAB: 24; 26 TABLET, FILM COATED ORAL at 22:00

## 2018-09-30 RX ADMIN — MEROPENEM SCH MLS/HR: 1 INJECTION, POWDER, FOR SOLUTION INTRAVENOUS at 08:48

## 2018-09-30 RX ADMIN — SODIUM CHLORIDE SCH ML: 9 INJECTION INTRAMUSCULAR; INTRAVENOUS; SUBCUTANEOUS at 22:00

## 2018-09-30 RX ADMIN — DEXTROSE SCH MLS/HR: 50 INJECTION, SOLUTION INTRAVENOUS at 08:42

## 2018-09-30 RX ADMIN — FUROSEMIDE SCH MG: 10 INJECTION, SOLUTION INTRAMUSCULAR; INTRAVENOUS at 05:21

## 2018-09-30 RX ADMIN — DRONEDARONE SCH MG: 400 TABLET, FILM COATED ORAL at 09:58

## 2018-09-30 RX ADMIN — METOCLOPRAMIDE SCH MG: 5 INJECTION, SOLUTION INTRAMUSCULAR; INTRAVENOUS at 22:07

## 2018-09-30 RX ADMIN — IPRATROPIUM BROMIDE SCH MG: 0.5 SOLUTION RESPIRATORY (INHALATION) at 18:10

## 2018-09-30 RX ADMIN — SODIUM CHLORIDE SCH ML: 9 INJECTION INTRAMUSCULAR; INTRAVENOUS; SUBCUTANEOUS at 09:59

## 2018-09-30 RX ADMIN — APIXABAN SCH MG: 5 TABLET, FILM COATED ORAL at 09:58

## 2018-09-30 RX ADMIN — MIDAZOLAM SCH MLS/HR: 5 INJECTION, SOLUTION INTRAMUSCULAR; INTRAVENOUS at 06:45

## 2018-09-30 RX ADMIN — PROPOFOL SCH MLS/HR: 10 INJECTION, EMULSION INTRAVENOUS at 06:46

## 2018-09-30 RX ADMIN — AZITHROMYCIN DIHYDRATE SCH MLS/HR: 500 INJECTION, POWDER, LYOPHILIZED, FOR SOLUTION INTRAVENOUS at 09:59

## 2018-09-30 RX ADMIN — MIDAZOLAM SCH MLS/HR: 5 INJECTION, SOLUTION INTRAMUSCULAR; INTRAVENOUS at 18:28

## 2018-09-30 RX ADMIN — BUDESONIDE SCH MG: 0.5 SUSPENSION RESPIRATORY (INHALATION) at 00:58

## 2018-09-30 RX ADMIN — BUDESONIDE SCH MG: 0.5 SUSPENSION RESPIRATORY (INHALATION) at 18:10

## 2018-09-30 RX ADMIN — IPRATROPIUM BROMIDE SCH MG: 0.5 SOLUTION RESPIRATORY (INHALATION) at 12:32

## 2018-09-30 RX ADMIN — RANOLAZINE SCH MG: 500 TABLET, FILM COATED, EXTENDED RELEASE ORAL at 09:59

## 2018-09-30 RX ADMIN — METOCLOPRAMIDE SCH MG: 5 INJECTION, SOLUTION INTRAMUSCULAR; INTRAVENOUS at 05:21

## 2018-09-30 RX ADMIN — DRONEDARONE SCH MG: 400 TABLET, FILM COATED ORAL at 22:07

## 2018-09-30 RX ADMIN — METOCLOPRAMIDE SCH MG: 5 INJECTION, SOLUTION INTRAMUSCULAR; INTRAVENOUS at 13:57

## 2018-09-30 RX ADMIN — SACUBITRIL AND VALSARTAN SCH TAB: 24; 26 TABLET, FILM COATED ORAL at 09:59

## 2018-09-30 RX ADMIN — SPIRONOLACTONE SCH MG: 25 TABLET, FILM COATED ORAL at 05:21

## 2018-09-30 RX ADMIN — MEROPENEM SCH MLS/HR: 1 INJECTION, POWDER, FOR SOLUTION INTRAVENOUS at 00:39

## 2018-09-30 RX ADMIN — MEROPENEM SCH MLS/HR: 1 INJECTION, POWDER, FOR SOLUTION INTRAVENOUS at 16:51

## 2018-09-30 RX ADMIN — PANTOPRAZOLE SODIUM SCH MG: 40 INJECTION, POWDER, FOR SOLUTION INTRAVENOUS at 09:58

## 2018-09-30 RX ADMIN — BUDESONIDE SCH MG: 0.5 SUSPENSION RESPIRATORY (INHALATION) at 06:59

## 2018-09-30 RX ADMIN — ALBUTEROL SULFATE SCH MG: 2.5 SOLUTION RESPIRATORY (INHALATION) at 12:32

## 2018-09-30 RX ADMIN — ALBUTEROL SULFATE SCH MG: 2.5 SOLUTION RESPIRATORY (INHALATION) at 18:10

## 2018-09-30 RX ADMIN — SPIRONOLACTONE SCH MG: 25 TABLET, FILM COATED ORAL at 17:58

## 2018-10-01 VITALS — DIASTOLIC BLOOD PRESSURE: 59 MMHG | SYSTOLIC BLOOD PRESSURE: 111 MMHG

## 2018-10-01 VITALS — DIASTOLIC BLOOD PRESSURE: 48 MMHG | SYSTOLIC BLOOD PRESSURE: 101 MMHG

## 2018-10-01 VITALS — DIASTOLIC BLOOD PRESSURE: 63 MMHG | SYSTOLIC BLOOD PRESSURE: 91 MMHG

## 2018-10-01 VITALS — DIASTOLIC BLOOD PRESSURE: 54 MMHG | SYSTOLIC BLOOD PRESSURE: 88 MMHG

## 2018-10-01 VITALS — DIASTOLIC BLOOD PRESSURE: 56 MMHG | SYSTOLIC BLOOD PRESSURE: 98 MMHG

## 2018-10-01 VITALS — DIASTOLIC BLOOD PRESSURE: 55 MMHG | SYSTOLIC BLOOD PRESSURE: 104 MMHG

## 2018-10-01 VITALS — SYSTOLIC BLOOD PRESSURE: 110 MMHG | DIASTOLIC BLOOD PRESSURE: 56 MMHG

## 2018-10-01 VITALS — SYSTOLIC BLOOD PRESSURE: 88 MMHG | DIASTOLIC BLOOD PRESSURE: 87 MMHG

## 2018-10-01 VITALS — SYSTOLIC BLOOD PRESSURE: 111 MMHG | DIASTOLIC BLOOD PRESSURE: 61 MMHG

## 2018-10-01 VITALS — SYSTOLIC BLOOD PRESSURE: 109 MMHG | DIASTOLIC BLOOD PRESSURE: 77 MMHG

## 2018-10-01 VITALS — DIASTOLIC BLOOD PRESSURE: 67 MMHG | SYSTOLIC BLOOD PRESSURE: 130 MMHG

## 2018-10-01 VITALS — DIASTOLIC BLOOD PRESSURE: 53 MMHG | SYSTOLIC BLOOD PRESSURE: 114 MMHG

## 2018-10-01 VITALS — SYSTOLIC BLOOD PRESSURE: 135 MMHG | DIASTOLIC BLOOD PRESSURE: 54 MMHG

## 2018-10-01 VITALS — DIASTOLIC BLOOD PRESSURE: 54 MMHG | SYSTOLIC BLOOD PRESSURE: 85 MMHG

## 2018-10-01 VITALS — SYSTOLIC BLOOD PRESSURE: 102 MMHG | DIASTOLIC BLOOD PRESSURE: 51 MMHG

## 2018-10-01 VITALS — DIASTOLIC BLOOD PRESSURE: 70 MMHG | SYSTOLIC BLOOD PRESSURE: 115 MMHG

## 2018-10-01 VITALS — SYSTOLIC BLOOD PRESSURE: 135 MMHG | DIASTOLIC BLOOD PRESSURE: 50 MMHG

## 2018-10-01 VITALS — DIASTOLIC BLOOD PRESSURE: 47 MMHG | SYSTOLIC BLOOD PRESSURE: 87 MMHG

## 2018-10-01 VITALS — SYSTOLIC BLOOD PRESSURE: 106 MMHG | DIASTOLIC BLOOD PRESSURE: 70 MMHG

## 2018-10-01 VITALS — SYSTOLIC BLOOD PRESSURE: 104 MMHG | DIASTOLIC BLOOD PRESSURE: 51 MMHG

## 2018-10-01 VITALS — DIASTOLIC BLOOD PRESSURE: 50 MMHG | SYSTOLIC BLOOD PRESSURE: 82 MMHG

## 2018-10-01 VITALS — SYSTOLIC BLOOD PRESSURE: 92 MMHG | DIASTOLIC BLOOD PRESSURE: 39 MMHG

## 2018-10-01 VITALS — DIASTOLIC BLOOD PRESSURE: 55 MMHG | SYSTOLIC BLOOD PRESSURE: 88 MMHG

## 2018-10-01 VITALS — DIASTOLIC BLOOD PRESSURE: 60 MMHG | SYSTOLIC BLOOD PRESSURE: 91 MMHG

## 2018-10-01 VITALS — SYSTOLIC BLOOD PRESSURE: 104 MMHG | DIASTOLIC BLOOD PRESSURE: 58 MMHG

## 2018-10-01 VITALS — SYSTOLIC BLOOD PRESSURE: 111 MMHG | DIASTOLIC BLOOD PRESSURE: 62 MMHG

## 2018-10-01 VITALS — SYSTOLIC BLOOD PRESSURE: 118 MMHG | DIASTOLIC BLOOD PRESSURE: 67 MMHG

## 2018-10-01 VITALS — SYSTOLIC BLOOD PRESSURE: 153 MMHG | DIASTOLIC BLOOD PRESSURE: 91 MMHG

## 2018-10-01 VITALS — SYSTOLIC BLOOD PRESSURE: 82 MMHG | DIASTOLIC BLOOD PRESSURE: 51 MMHG

## 2018-10-01 VITALS — SYSTOLIC BLOOD PRESSURE: 124 MMHG | DIASTOLIC BLOOD PRESSURE: 67 MMHG

## 2018-10-01 VITALS — DIASTOLIC BLOOD PRESSURE: 56 MMHG | SYSTOLIC BLOOD PRESSURE: 112 MMHG

## 2018-10-01 VITALS — DIASTOLIC BLOOD PRESSURE: 54 MMHG | SYSTOLIC BLOOD PRESSURE: 90 MMHG

## 2018-10-01 VITALS — SYSTOLIC BLOOD PRESSURE: 91 MMHG | DIASTOLIC BLOOD PRESSURE: 61 MMHG

## 2018-10-01 VITALS — SYSTOLIC BLOOD PRESSURE: 119 MMHG | DIASTOLIC BLOOD PRESSURE: 72 MMHG

## 2018-10-01 VITALS — SYSTOLIC BLOOD PRESSURE: 107 MMHG | DIASTOLIC BLOOD PRESSURE: 72 MMHG

## 2018-10-01 VITALS — DIASTOLIC BLOOD PRESSURE: 67 MMHG | SYSTOLIC BLOOD PRESSURE: 101 MMHG

## 2018-10-01 VITALS — SYSTOLIC BLOOD PRESSURE: 102 MMHG | DIASTOLIC BLOOD PRESSURE: 52 MMHG

## 2018-10-01 VITALS — DIASTOLIC BLOOD PRESSURE: 54 MMHG | SYSTOLIC BLOOD PRESSURE: 78 MMHG

## 2018-10-01 VITALS — DIASTOLIC BLOOD PRESSURE: 68 MMHG | SYSTOLIC BLOOD PRESSURE: 119 MMHG

## 2018-10-01 VITALS — SYSTOLIC BLOOD PRESSURE: 87 MMHG | DIASTOLIC BLOOD PRESSURE: 52 MMHG

## 2018-10-01 VITALS — DIASTOLIC BLOOD PRESSURE: 68 MMHG | SYSTOLIC BLOOD PRESSURE: 106 MMHG

## 2018-10-01 VITALS — DIASTOLIC BLOOD PRESSURE: 56 MMHG | SYSTOLIC BLOOD PRESSURE: 96 MMHG

## 2018-10-01 VITALS — DIASTOLIC BLOOD PRESSURE: 57 MMHG | SYSTOLIC BLOOD PRESSURE: 103 MMHG

## 2018-10-01 VITALS — DIASTOLIC BLOOD PRESSURE: 47 MMHG | SYSTOLIC BLOOD PRESSURE: 101 MMHG

## 2018-10-01 VITALS — DIASTOLIC BLOOD PRESSURE: 51 MMHG | SYSTOLIC BLOOD PRESSURE: 94 MMHG

## 2018-10-01 VITALS — SYSTOLIC BLOOD PRESSURE: 78 MMHG | DIASTOLIC BLOOD PRESSURE: 87 MMHG

## 2018-10-01 VITALS — DIASTOLIC BLOOD PRESSURE: 61 MMHG | SYSTOLIC BLOOD PRESSURE: 122 MMHG

## 2018-10-01 VITALS — SYSTOLIC BLOOD PRESSURE: 108 MMHG | DIASTOLIC BLOOD PRESSURE: 57 MMHG

## 2018-10-01 VITALS — DIASTOLIC BLOOD PRESSURE: 53 MMHG | SYSTOLIC BLOOD PRESSURE: 82 MMHG

## 2018-10-01 VITALS — DIASTOLIC BLOOD PRESSURE: 47 MMHG | SYSTOLIC BLOOD PRESSURE: 106 MMHG

## 2018-10-01 VITALS — SYSTOLIC BLOOD PRESSURE: 90 MMHG | DIASTOLIC BLOOD PRESSURE: 54 MMHG

## 2018-10-01 VITALS — SYSTOLIC BLOOD PRESSURE: 114 MMHG | DIASTOLIC BLOOD PRESSURE: 68 MMHG

## 2018-10-01 VITALS — DIASTOLIC BLOOD PRESSURE: 56 MMHG | SYSTOLIC BLOOD PRESSURE: 106 MMHG

## 2018-10-01 VITALS — DIASTOLIC BLOOD PRESSURE: 50 MMHG | SYSTOLIC BLOOD PRESSURE: 107 MMHG

## 2018-10-01 VITALS — SYSTOLIC BLOOD PRESSURE: 115 MMHG | DIASTOLIC BLOOD PRESSURE: 61 MMHG

## 2018-10-01 VITALS — SYSTOLIC BLOOD PRESSURE: 107 MMHG | DIASTOLIC BLOOD PRESSURE: 58 MMHG

## 2018-10-01 VITALS — DIASTOLIC BLOOD PRESSURE: 54 MMHG | SYSTOLIC BLOOD PRESSURE: 101 MMHG

## 2018-10-01 VITALS — SYSTOLIC BLOOD PRESSURE: 144 MMHG | DIASTOLIC BLOOD PRESSURE: 45 MMHG

## 2018-10-01 VITALS — SYSTOLIC BLOOD PRESSURE: 103 MMHG | DIASTOLIC BLOOD PRESSURE: 59 MMHG

## 2018-10-01 VITALS — SYSTOLIC BLOOD PRESSURE: 100 MMHG | DIASTOLIC BLOOD PRESSURE: 49 MMHG

## 2018-10-01 VITALS — SYSTOLIC BLOOD PRESSURE: 108 MMHG | DIASTOLIC BLOOD PRESSURE: 72 MMHG

## 2018-10-01 VITALS — SYSTOLIC BLOOD PRESSURE: 174 MMHG | DIASTOLIC BLOOD PRESSURE: 86 MMHG

## 2018-10-01 VITALS — SYSTOLIC BLOOD PRESSURE: 101 MMHG | DIASTOLIC BLOOD PRESSURE: 58 MMHG

## 2018-10-01 VITALS — DIASTOLIC BLOOD PRESSURE: 54 MMHG | SYSTOLIC BLOOD PRESSURE: 112 MMHG

## 2018-10-01 VITALS — SYSTOLIC BLOOD PRESSURE: 125 MMHG | DIASTOLIC BLOOD PRESSURE: 61 MMHG

## 2018-10-01 VITALS — SYSTOLIC BLOOD PRESSURE: 99 MMHG | DIASTOLIC BLOOD PRESSURE: 65 MMHG

## 2018-10-01 VITALS — DIASTOLIC BLOOD PRESSURE: 55 MMHG | SYSTOLIC BLOOD PRESSURE: 109 MMHG

## 2018-10-01 VITALS — SYSTOLIC BLOOD PRESSURE: 118 MMHG | DIASTOLIC BLOOD PRESSURE: 61 MMHG

## 2018-10-01 VITALS — SYSTOLIC BLOOD PRESSURE: 88 MMHG | DIASTOLIC BLOOD PRESSURE: 47 MMHG

## 2018-10-01 VITALS — SYSTOLIC BLOOD PRESSURE: 127 MMHG | DIASTOLIC BLOOD PRESSURE: 60 MMHG

## 2018-10-01 VITALS — DIASTOLIC BLOOD PRESSURE: 53 MMHG | SYSTOLIC BLOOD PRESSURE: 108 MMHG

## 2018-10-01 VITALS — DIASTOLIC BLOOD PRESSURE: 61 MMHG | SYSTOLIC BLOOD PRESSURE: 103 MMHG

## 2018-10-01 VITALS — SYSTOLIC BLOOD PRESSURE: 106 MMHG | DIASTOLIC BLOOD PRESSURE: 59 MMHG

## 2018-10-01 VITALS — DIASTOLIC BLOOD PRESSURE: 65 MMHG | SYSTOLIC BLOOD PRESSURE: 129 MMHG

## 2018-10-01 VITALS — DIASTOLIC BLOOD PRESSURE: 62 MMHG | SYSTOLIC BLOOD PRESSURE: 96 MMHG

## 2018-10-01 VITALS — SYSTOLIC BLOOD PRESSURE: 88 MMHG | DIASTOLIC BLOOD PRESSURE: 45 MMHG

## 2018-10-01 VITALS — DIASTOLIC BLOOD PRESSURE: 49 MMHG | SYSTOLIC BLOOD PRESSURE: 108 MMHG

## 2018-10-01 VITALS — SYSTOLIC BLOOD PRESSURE: 102 MMHG | DIASTOLIC BLOOD PRESSURE: 49 MMHG

## 2018-10-01 VITALS — SYSTOLIC BLOOD PRESSURE: 98 MMHG | DIASTOLIC BLOOD PRESSURE: 55 MMHG

## 2018-10-01 VITALS — DIASTOLIC BLOOD PRESSURE: 65 MMHG | SYSTOLIC BLOOD PRESSURE: 124 MMHG

## 2018-10-01 VITALS — SYSTOLIC BLOOD PRESSURE: 109 MMHG | DIASTOLIC BLOOD PRESSURE: 51 MMHG

## 2018-10-01 VITALS — SYSTOLIC BLOOD PRESSURE: 101 MMHG | DIASTOLIC BLOOD PRESSURE: 61 MMHG

## 2018-10-01 VITALS — SYSTOLIC BLOOD PRESSURE: 101 MMHG | DIASTOLIC BLOOD PRESSURE: 64 MMHG

## 2018-10-01 VITALS — DIASTOLIC BLOOD PRESSURE: 52 MMHG | SYSTOLIC BLOOD PRESSURE: 106 MMHG

## 2018-10-01 VITALS — SYSTOLIC BLOOD PRESSURE: 98 MMHG | DIASTOLIC BLOOD PRESSURE: 61 MMHG

## 2018-10-01 VITALS — DIASTOLIC BLOOD PRESSURE: 50 MMHG | SYSTOLIC BLOOD PRESSURE: 78 MMHG

## 2018-10-01 VITALS — DIASTOLIC BLOOD PRESSURE: 63 MMHG | SYSTOLIC BLOOD PRESSURE: 106 MMHG

## 2018-10-01 VITALS — DIASTOLIC BLOOD PRESSURE: 57 MMHG | SYSTOLIC BLOOD PRESSURE: 101 MMHG

## 2018-10-01 VITALS — DIASTOLIC BLOOD PRESSURE: 61 MMHG | SYSTOLIC BLOOD PRESSURE: 98 MMHG

## 2018-10-01 VITALS — DIASTOLIC BLOOD PRESSURE: 55 MMHG | SYSTOLIC BLOOD PRESSURE: 98 MMHG

## 2018-10-01 LAB
ANION GAP SERPL CALCULATED.3IONS-SCNC: 7 MMOL/L (ref 5–15)
BUN SERPL-MCNC: 22 MG/DL (ref 7–18)
BUN/CREAT SERPL: 34.4
CALCIUM SERPL-MCNC: 8.2 MG/DL (ref 8.5–10.1)
CHLORIDE SERPL-SCNC: 102 MMOL/L (ref 98–107)
CO2 SERPL-SCNC: 30 MMOL/L (ref 21–32)
GLUCOSE SERPL-MCNC: 105 MG/DL (ref 74–106)
HCT VFR BLD AUTO: 38.1 % (ref 36–46)
HGB BLD-MCNC: 12.4 G/DL (ref 12.2–16.2)
MCH RBC QN AUTO: 26.4 PG (ref 28–32)
MCV RBC AUTO: 81.3 FL (ref 80–100)
NRBC BLD QL AUTO: 0.1 %
POTASSIUM SERPL-SCNC: 3.5 MMOL/L (ref 3.5–5.1)
SODIUM SERPL-SCNC: 139 MMOL/L (ref 136–145)

## 2018-10-01 PROCEDURE — 5A09457 ASSISTANCE WITH RESPIRATORY VENTILATION, 24-96 CONSECUTIVE HOURS, CONTINUOUS POSITIVE AIRWAY PRESSURE: ICD-10-PCS | Performed by: NURSE PRACTITIONER

## 2018-10-01 RX ADMIN — IPRATROPIUM BROMIDE SCH MG: 0.5 SOLUTION RESPIRATORY (INHALATION) at 12:08

## 2018-10-01 RX ADMIN — METOCLOPRAMIDE SCH MG: 5 INJECTION, SOLUTION INTRAMUSCULAR; INTRAVENOUS at 14:00

## 2018-10-01 RX ADMIN — SACUBITRIL AND VALSARTAN SCH TAB: 24; 26 TABLET, FILM COATED ORAL at 21:36

## 2018-10-01 RX ADMIN — APIXABAN SCH MG: 5 TABLET, FILM COATED ORAL at 21:35

## 2018-10-01 RX ADMIN — SPIRONOLACTONE SCH MG: 25 TABLET, FILM COATED ORAL at 05:36

## 2018-10-01 RX ADMIN — SACUBITRIL AND VALSARTAN SCH TAB: 24; 26 TABLET, FILM COATED ORAL at 10:00

## 2018-10-01 RX ADMIN — BUDESONIDE SCH MG: 0.5 SUSPENSION RESPIRATORY (INHALATION) at 18:24

## 2018-10-01 RX ADMIN — METOCLOPRAMIDE SCH MG: 5 INJECTION, SOLUTION INTRAMUSCULAR; INTRAVENOUS at 05:35

## 2018-10-01 RX ADMIN — ALBUTEROL SULFATE SCH MG: 2.5 SOLUTION RESPIRATORY (INHALATION) at 06:33

## 2018-10-01 RX ADMIN — PROPOFOL SCH MLS/HR: 10 INJECTION, EMULSION INTRAVENOUS at 09:36

## 2018-10-01 RX ADMIN — IPRATROPIUM BROMIDE SCH MG: 0.5 SOLUTION RESPIRATORY (INHALATION) at 18:24

## 2018-10-01 RX ADMIN — MEROPENEM SCH MLS/HR: 1 INJECTION, POWDER, FOR SOLUTION INTRAVENOUS at 09:00

## 2018-10-01 RX ADMIN — ALBUTEROL SULFATE SCH MG: 2.5 SOLUTION RESPIRATORY (INHALATION) at 00:53

## 2018-10-01 RX ADMIN — RANOLAZINE SCH MG: 500 TABLET, FILM COATED, EXTENDED RELEASE ORAL at 21:35

## 2018-10-01 RX ADMIN — AZITHROMYCIN DIHYDRATE SCH MLS/HR: 500 INJECTION, POWDER, LYOPHILIZED, FOR SOLUTION INTRAVENOUS at 12:20

## 2018-10-01 RX ADMIN — FUROSEMIDE SCH MG: 10 INJECTION, SOLUTION INTRAMUSCULAR; INTRAVENOUS at 05:35

## 2018-10-01 RX ADMIN — IPRATROPIUM BROMIDE SCH MG: 0.5 SOLUTION RESPIRATORY (INHALATION) at 06:33

## 2018-10-01 RX ADMIN — PROPOFOL SCH MLS/HR: 10 INJECTION, EMULSION INTRAVENOUS at 00:38

## 2018-10-01 RX ADMIN — PANTOPRAZOLE SODIUM SCH MG: 40 INJECTION, POWDER, FOR SOLUTION INTRAVENOUS at 10:57

## 2018-10-01 RX ADMIN — DRONEDARONE SCH MG: 400 TABLET, FILM COATED ORAL at 21:35

## 2018-10-01 RX ADMIN — FENTANYL CITRATE SCH MLS/HR: 50 INJECTION, SOLUTION INTRAMUSCULAR; INTRAVENOUS at 10:25

## 2018-10-01 RX ADMIN — SPIRONOLACTONE SCH MG: 25 TABLET, FILM COATED ORAL at 17:19

## 2018-10-01 RX ADMIN — BUDESONIDE SCH MG: 0.5 SUSPENSION RESPIRATORY (INHALATION) at 06:34

## 2018-10-01 RX ADMIN — NOREPINEPHRINE BITARTRATE SCH MLS/HR: 1 INJECTION, SOLUTION, CONCENTRATE INTRAVENOUS at 23:05

## 2018-10-01 RX ADMIN — SODIUM CHLORIDE SCH ML: 9 INJECTION INTRAMUSCULAR; INTRAVENOUS; SUBCUTANEOUS at 21:36

## 2018-10-01 RX ADMIN — RANOLAZINE SCH MG: 500 TABLET, FILM COATED, EXTENDED RELEASE ORAL at 10:00

## 2018-10-01 RX ADMIN — SODIUM CHLORIDE SCH ML: 9 INJECTION INTRAMUSCULAR; INTRAVENOUS; SUBCUTANEOUS at 09:36

## 2018-10-01 RX ADMIN — APIXABAN SCH MG: 5 TABLET, FILM COATED ORAL at 10:57

## 2018-10-01 RX ADMIN — ALBUTEROL SULFATE SCH MG: 2.5 SOLUTION RESPIRATORY (INHALATION) at 12:08

## 2018-10-01 RX ADMIN — IPRATROPIUM BROMIDE SCH MG: 0.5 SOLUTION RESPIRATORY (INHALATION) at 00:53

## 2018-10-01 RX ADMIN — FUROSEMIDE SCH MG: 10 INJECTION, SOLUTION INTRAMUSCULAR; INTRAVENOUS at 16:53

## 2018-10-01 RX ADMIN — ALBUTEROL SULFATE SCH MG: 2.5 SOLUTION RESPIRATORY (INHALATION) at 18:24

## 2018-10-01 RX ADMIN — DRONEDARONE SCH MG: 400 TABLET, FILM COATED ORAL at 10:57

## 2018-10-01 RX ADMIN — MEROPENEM SCH MLS/HR: 1 INJECTION, POWDER, FOR SOLUTION INTRAVENOUS at 16:52

## 2018-10-01 RX ADMIN — MEROPENEM SCH MLS/HR: 1 INJECTION, POWDER, FOR SOLUTION INTRAVENOUS at 01:06

## 2018-10-01 RX ADMIN — DEXTROSE SCH MLS/HR: 50 INJECTION, SOLUTION INTRAVENOUS at 10:52

## 2018-10-01 RX ADMIN — METOCLOPRAMIDE SCH MG: 5 INJECTION, SOLUTION INTRAMUSCULAR; INTRAVENOUS at 21:34

## 2018-10-01 RX ADMIN — MIDAZOLAM SCH MLS/HR: 5 INJECTION, SOLUTION INTRAMUSCULAR; INTRAVENOUS at 07:37

## 2018-10-02 VITALS — SYSTOLIC BLOOD PRESSURE: 110 MMHG | DIASTOLIC BLOOD PRESSURE: 55 MMHG

## 2018-10-02 VITALS — DIASTOLIC BLOOD PRESSURE: 51 MMHG | SYSTOLIC BLOOD PRESSURE: 97 MMHG

## 2018-10-02 VITALS — DIASTOLIC BLOOD PRESSURE: 52 MMHG | SYSTOLIC BLOOD PRESSURE: 100 MMHG

## 2018-10-02 VITALS — DIASTOLIC BLOOD PRESSURE: 70 MMHG | SYSTOLIC BLOOD PRESSURE: 100 MMHG

## 2018-10-02 VITALS — SYSTOLIC BLOOD PRESSURE: 114 MMHG | DIASTOLIC BLOOD PRESSURE: 55 MMHG

## 2018-10-02 VITALS — DIASTOLIC BLOOD PRESSURE: 66 MMHG | SYSTOLIC BLOOD PRESSURE: 102 MMHG

## 2018-10-02 VITALS — DIASTOLIC BLOOD PRESSURE: 53 MMHG | SYSTOLIC BLOOD PRESSURE: 93 MMHG

## 2018-10-02 VITALS — SYSTOLIC BLOOD PRESSURE: 103 MMHG | DIASTOLIC BLOOD PRESSURE: 47 MMHG

## 2018-10-02 VITALS — DIASTOLIC BLOOD PRESSURE: 58 MMHG | SYSTOLIC BLOOD PRESSURE: 115 MMHG

## 2018-10-02 VITALS — DIASTOLIC BLOOD PRESSURE: 62 MMHG | SYSTOLIC BLOOD PRESSURE: 109 MMHG

## 2018-10-02 VITALS — SYSTOLIC BLOOD PRESSURE: 106 MMHG | DIASTOLIC BLOOD PRESSURE: 61 MMHG

## 2018-10-02 VITALS — DIASTOLIC BLOOD PRESSURE: 67 MMHG | SYSTOLIC BLOOD PRESSURE: 102 MMHG

## 2018-10-02 VITALS — DIASTOLIC BLOOD PRESSURE: 59 MMHG | SYSTOLIC BLOOD PRESSURE: 112 MMHG

## 2018-10-02 VITALS — SYSTOLIC BLOOD PRESSURE: 112 MMHG | DIASTOLIC BLOOD PRESSURE: 70 MMHG

## 2018-10-02 VITALS — SYSTOLIC BLOOD PRESSURE: 116 MMHG | DIASTOLIC BLOOD PRESSURE: 78 MMHG

## 2018-10-02 VITALS — SYSTOLIC BLOOD PRESSURE: 112 MMHG | DIASTOLIC BLOOD PRESSURE: 59 MMHG

## 2018-10-02 VITALS — DIASTOLIC BLOOD PRESSURE: 51 MMHG | SYSTOLIC BLOOD PRESSURE: 75 MMHG

## 2018-10-02 VITALS — SYSTOLIC BLOOD PRESSURE: 116 MMHG | DIASTOLIC BLOOD PRESSURE: 59 MMHG

## 2018-10-02 VITALS — DIASTOLIC BLOOD PRESSURE: 68 MMHG | SYSTOLIC BLOOD PRESSURE: 100 MMHG

## 2018-10-02 VITALS — DIASTOLIC BLOOD PRESSURE: 65 MMHG | SYSTOLIC BLOOD PRESSURE: 106 MMHG

## 2018-10-02 VITALS — DIASTOLIC BLOOD PRESSURE: 48 MMHG | SYSTOLIC BLOOD PRESSURE: 95 MMHG

## 2018-10-02 VITALS — DIASTOLIC BLOOD PRESSURE: 43 MMHG | SYSTOLIC BLOOD PRESSURE: 96 MMHG

## 2018-10-02 VITALS — SYSTOLIC BLOOD PRESSURE: 97 MMHG | DIASTOLIC BLOOD PRESSURE: 58 MMHG

## 2018-10-02 VITALS — DIASTOLIC BLOOD PRESSURE: 70 MMHG | SYSTOLIC BLOOD PRESSURE: 99 MMHG

## 2018-10-02 VITALS — DIASTOLIC BLOOD PRESSURE: 78 MMHG | SYSTOLIC BLOOD PRESSURE: 127 MMHG

## 2018-10-02 VITALS — DIASTOLIC BLOOD PRESSURE: 50 MMHG | SYSTOLIC BLOOD PRESSURE: 81 MMHG

## 2018-10-02 VITALS — DIASTOLIC BLOOD PRESSURE: 58 MMHG | SYSTOLIC BLOOD PRESSURE: 106 MMHG

## 2018-10-02 VITALS — DIASTOLIC BLOOD PRESSURE: 47 MMHG | SYSTOLIC BLOOD PRESSURE: 94 MMHG

## 2018-10-02 VITALS — DIASTOLIC BLOOD PRESSURE: 63 MMHG | SYSTOLIC BLOOD PRESSURE: 104 MMHG

## 2018-10-02 VITALS — DIASTOLIC BLOOD PRESSURE: 47 MMHG | SYSTOLIC BLOOD PRESSURE: 100 MMHG

## 2018-10-02 VITALS — DIASTOLIC BLOOD PRESSURE: 47 MMHG | SYSTOLIC BLOOD PRESSURE: 103 MMHG

## 2018-10-02 VITALS — DIASTOLIC BLOOD PRESSURE: 81 MMHG | SYSTOLIC BLOOD PRESSURE: 128 MMHG

## 2018-10-02 VITALS — SYSTOLIC BLOOD PRESSURE: 103 MMHG | DIASTOLIC BLOOD PRESSURE: 55 MMHG

## 2018-10-02 VITALS — DIASTOLIC BLOOD PRESSURE: 43 MMHG | SYSTOLIC BLOOD PRESSURE: 121 MMHG

## 2018-10-02 VITALS — SYSTOLIC BLOOD PRESSURE: 102 MMHG | DIASTOLIC BLOOD PRESSURE: 52 MMHG

## 2018-10-02 VITALS — SYSTOLIC BLOOD PRESSURE: 88 MMHG | DIASTOLIC BLOOD PRESSURE: 50 MMHG

## 2018-10-02 VITALS — DIASTOLIC BLOOD PRESSURE: 53 MMHG | SYSTOLIC BLOOD PRESSURE: 100 MMHG

## 2018-10-02 VITALS — SYSTOLIC BLOOD PRESSURE: 123 MMHG | DIASTOLIC BLOOD PRESSURE: 68 MMHG

## 2018-10-02 VITALS — SYSTOLIC BLOOD PRESSURE: 102 MMHG | DIASTOLIC BLOOD PRESSURE: 66 MMHG

## 2018-10-02 VITALS — SYSTOLIC BLOOD PRESSURE: 106 MMHG | DIASTOLIC BLOOD PRESSURE: 63 MMHG

## 2018-10-02 VITALS — DIASTOLIC BLOOD PRESSURE: 54 MMHG | SYSTOLIC BLOOD PRESSURE: 113 MMHG

## 2018-10-02 VITALS — SYSTOLIC BLOOD PRESSURE: 103 MMHG | DIASTOLIC BLOOD PRESSURE: 59 MMHG

## 2018-10-02 VITALS — SYSTOLIC BLOOD PRESSURE: 95 MMHG | DIASTOLIC BLOOD PRESSURE: 58 MMHG

## 2018-10-02 VITALS — DIASTOLIC BLOOD PRESSURE: 54 MMHG | SYSTOLIC BLOOD PRESSURE: 114 MMHG

## 2018-10-02 VITALS — DIASTOLIC BLOOD PRESSURE: 53 MMHG | SYSTOLIC BLOOD PRESSURE: 98 MMHG

## 2018-10-02 VITALS — DIASTOLIC BLOOD PRESSURE: 45 MMHG | SYSTOLIC BLOOD PRESSURE: 95 MMHG

## 2018-10-02 VITALS — SYSTOLIC BLOOD PRESSURE: 95 MMHG | DIASTOLIC BLOOD PRESSURE: 48 MMHG

## 2018-10-02 VITALS — SYSTOLIC BLOOD PRESSURE: 90 MMHG | DIASTOLIC BLOOD PRESSURE: 53 MMHG

## 2018-10-02 VITALS — SYSTOLIC BLOOD PRESSURE: 148 MMHG | DIASTOLIC BLOOD PRESSURE: 55 MMHG

## 2018-10-02 VITALS — SYSTOLIC BLOOD PRESSURE: 116 MMHG | DIASTOLIC BLOOD PRESSURE: 75 MMHG

## 2018-10-02 VITALS — DIASTOLIC BLOOD PRESSURE: 60 MMHG | SYSTOLIC BLOOD PRESSURE: 122 MMHG

## 2018-10-02 VITALS — DIASTOLIC BLOOD PRESSURE: 61 MMHG | SYSTOLIC BLOOD PRESSURE: 99 MMHG

## 2018-10-02 VITALS — DIASTOLIC BLOOD PRESSURE: 58 MMHG | SYSTOLIC BLOOD PRESSURE: 120 MMHG

## 2018-10-02 VITALS — DIASTOLIC BLOOD PRESSURE: 50 MMHG | SYSTOLIC BLOOD PRESSURE: 204 MMHG

## 2018-10-02 VITALS — DIASTOLIC BLOOD PRESSURE: 78 MMHG | SYSTOLIC BLOOD PRESSURE: 105 MMHG

## 2018-10-02 VITALS — SYSTOLIC BLOOD PRESSURE: 109 MMHG | DIASTOLIC BLOOD PRESSURE: 62 MMHG

## 2018-10-02 VITALS — SYSTOLIC BLOOD PRESSURE: 92 MMHG | DIASTOLIC BLOOD PRESSURE: 44 MMHG

## 2018-10-02 VITALS — DIASTOLIC BLOOD PRESSURE: 58 MMHG | SYSTOLIC BLOOD PRESSURE: 99 MMHG

## 2018-10-02 VITALS — SYSTOLIC BLOOD PRESSURE: 104 MMHG | DIASTOLIC BLOOD PRESSURE: 51 MMHG

## 2018-10-02 VITALS — DIASTOLIC BLOOD PRESSURE: 44 MMHG | SYSTOLIC BLOOD PRESSURE: 98 MMHG

## 2018-10-02 VITALS — SYSTOLIC BLOOD PRESSURE: 110 MMHG | DIASTOLIC BLOOD PRESSURE: 60 MMHG

## 2018-10-02 VITALS — DIASTOLIC BLOOD PRESSURE: 50 MMHG | SYSTOLIC BLOOD PRESSURE: 113 MMHG

## 2018-10-02 VITALS — DIASTOLIC BLOOD PRESSURE: 68 MMHG | SYSTOLIC BLOOD PRESSURE: 134 MMHG

## 2018-10-02 VITALS — DIASTOLIC BLOOD PRESSURE: 47 MMHG | SYSTOLIC BLOOD PRESSURE: 114 MMHG

## 2018-10-02 VITALS — SYSTOLIC BLOOD PRESSURE: 96 MMHG | DIASTOLIC BLOOD PRESSURE: 43 MMHG

## 2018-10-02 VITALS — DIASTOLIC BLOOD PRESSURE: 62 MMHG | SYSTOLIC BLOOD PRESSURE: 107 MMHG

## 2018-10-02 VITALS — DIASTOLIC BLOOD PRESSURE: 62 MMHG | SYSTOLIC BLOOD PRESSURE: 103 MMHG

## 2018-10-02 VITALS — DIASTOLIC BLOOD PRESSURE: 50 MMHG | SYSTOLIC BLOOD PRESSURE: 106 MMHG

## 2018-10-02 VITALS — DIASTOLIC BLOOD PRESSURE: 78 MMHG | SYSTOLIC BLOOD PRESSURE: 101 MMHG

## 2018-10-02 VITALS — DIASTOLIC BLOOD PRESSURE: 59 MMHG | SYSTOLIC BLOOD PRESSURE: 108 MMHG

## 2018-10-02 VITALS — SYSTOLIC BLOOD PRESSURE: 99 MMHG | DIASTOLIC BLOOD PRESSURE: 56 MMHG

## 2018-10-02 VITALS — SYSTOLIC BLOOD PRESSURE: 116 MMHG | DIASTOLIC BLOOD PRESSURE: 71 MMHG

## 2018-10-02 VITALS — SYSTOLIC BLOOD PRESSURE: 92 MMHG | DIASTOLIC BLOOD PRESSURE: 46 MMHG

## 2018-10-02 VITALS — SYSTOLIC BLOOD PRESSURE: 111 MMHG | DIASTOLIC BLOOD PRESSURE: 59 MMHG

## 2018-10-02 VITALS — DIASTOLIC BLOOD PRESSURE: 66 MMHG | SYSTOLIC BLOOD PRESSURE: 96 MMHG

## 2018-10-02 VITALS — SYSTOLIC BLOOD PRESSURE: 96 MMHG | DIASTOLIC BLOOD PRESSURE: 62 MMHG

## 2018-10-02 LAB
ANION GAP SERPL CALCULATED.3IONS-SCNC: 6 MMOL/L (ref 5–15)
BUN SERPL-MCNC: 21 MG/DL (ref 7–18)
BUN/CREAT SERPL: 38.9
CALCIUM SERPL-MCNC: 8.3 MG/DL (ref 8.5–10.1)
CHLORIDE SERPL-SCNC: 100 MMOL/L (ref 98–107)
CO2 SERPL-SCNC: 33 MMOL/L (ref 21–32)
GLUCOSE SERPL-MCNC: 103 MG/DL (ref 74–106)
HCT VFR BLD AUTO: 40.4 % (ref 36–46)
HGB BLD-MCNC: 12.8 G/DL (ref 12.2–16.2)
MCH RBC QN AUTO: 25.7 PG (ref 28–32)
MCV RBC AUTO: 81.2 FL (ref 80–100)
NRBC BLD QL AUTO: 0 %
POTASSIUM SERPL-SCNC: 3.9 MMOL/L (ref 3.5–5.1)
SODIUM SERPL-SCNC: 139 MMOL/L (ref 136–145)

## 2018-10-02 RX ADMIN — IPRATROPIUM BROMIDE SCH MG: 0.5 SOLUTION RESPIRATORY (INHALATION) at 06:56

## 2018-10-02 RX ADMIN — ALBUTEROL SULFATE SCH MG: 2.5 SOLUTION RESPIRATORY (INHALATION) at 11:40

## 2018-10-02 RX ADMIN — AZITHROMYCIN DIHYDRATE SCH MLS/HR: 500 INJECTION, POWDER, LYOPHILIZED, FOR SOLUTION INTRAVENOUS at 09:57

## 2018-10-02 RX ADMIN — DRONEDARONE SCH MG: 400 TABLET, FILM COATED ORAL at 20:34

## 2018-10-02 RX ADMIN — SODIUM CHLORIDE SCH ML: 9 INJECTION INTRAMUSCULAR; INTRAVENOUS; SUBCUTANEOUS at 09:55

## 2018-10-02 RX ADMIN — MORPHINE SULFATE PRN MG: 4 INJECTION, SOLUTION INTRAMUSCULAR; INTRAVENOUS at 20:34

## 2018-10-02 RX ADMIN — ALBUTEROL SULFATE SCH MG: 2.5 SOLUTION RESPIRATORY (INHALATION) at 06:56

## 2018-10-02 RX ADMIN — MEROPENEM SCH MLS/HR: 1 INJECTION, POWDER, FOR SOLUTION INTRAVENOUS at 00:45

## 2018-10-02 RX ADMIN — SODIUM CHLORIDE SCH ML: 9 INJECTION INTRAMUSCULAR; INTRAVENOUS; SUBCUTANEOUS at 20:34

## 2018-10-02 RX ADMIN — SPIRONOLACTONE SCH MG: 25 TABLET, FILM COATED ORAL at 06:00

## 2018-10-02 RX ADMIN — APIXABAN SCH MG: 5 TABLET, FILM COATED ORAL at 20:34

## 2018-10-02 RX ADMIN — ONDANSETRON HYDROCHLORIDE PRN MG: 2 INJECTION, SOLUTION INTRAMUSCULAR; INTRAVENOUS at 20:34

## 2018-10-02 RX ADMIN — PANTOPRAZOLE SODIUM SCH MG: 40 INJECTION, POWDER, FOR SOLUTION INTRAVENOUS at 09:55

## 2018-10-02 RX ADMIN — FUROSEMIDE SCH MG: 10 INJECTION, SOLUTION INTRAMUSCULAR; INTRAVENOUS at 06:29

## 2018-10-02 RX ADMIN — BUDESONIDE SCH MG: 0.5 SUSPENSION RESPIRATORY (INHALATION) at 18:57

## 2018-10-02 RX ADMIN — SPIRONOLACTONE SCH MG: 25 TABLET, FILM COATED ORAL at 18:00

## 2018-10-02 RX ADMIN — IPRATROPIUM BROMIDE SCH MG: 0.5 SOLUTION RESPIRATORY (INHALATION) at 00:08

## 2018-10-02 RX ADMIN — Medication PRN ML: at 20:35

## 2018-10-02 RX ADMIN — RANOLAZINE SCH MG: 500 TABLET, FILM COATED, EXTENDED RELEASE ORAL at 09:56

## 2018-10-02 RX ADMIN — ALBUTEROL SULFATE SCH MG: 2.5 SOLUTION RESPIRATORY (INHALATION) at 18:57

## 2018-10-02 RX ADMIN — APIXABAN SCH MG: 5 TABLET, FILM COATED ORAL at 09:56

## 2018-10-02 RX ADMIN — MEROPENEM SCH MLS/HR: 1 INJECTION, POWDER, FOR SOLUTION INTRAVENOUS at 08:47

## 2018-10-02 RX ADMIN — RANOLAZINE SCH MG: 500 TABLET, FILM COATED, EXTENDED RELEASE ORAL at 20:34

## 2018-10-02 RX ADMIN — BUDESONIDE SCH MG: 0.5 SUSPENSION RESPIRATORY (INHALATION) at 06:56

## 2018-10-02 RX ADMIN — FUROSEMIDE SCH MG: 10 INJECTION, SOLUTION INTRAMUSCULAR; INTRAVENOUS at 18:24

## 2018-10-02 RX ADMIN — IPRATROPIUM BROMIDE SCH MG: 0.5 SOLUTION RESPIRATORY (INHALATION) at 11:40

## 2018-10-02 RX ADMIN — ALBUTEROL SULFATE SCH MG: 2.5 SOLUTION RESPIRATORY (INHALATION) at 00:08

## 2018-10-02 RX ADMIN — DEXTROSE SCH MLS/HR: 50 INJECTION, SOLUTION INTRAVENOUS at 09:42

## 2018-10-02 RX ADMIN — SACUBITRIL AND VALSARTAN SCH TAB: 24; 26 TABLET, FILM COATED ORAL at 09:56

## 2018-10-02 RX ADMIN — DRONEDARONE SCH MG: 400 TABLET, FILM COATED ORAL at 09:55

## 2018-10-02 RX ADMIN — SACUBITRIL AND VALSARTAN SCH TAB: 24; 26 TABLET, FILM COATED ORAL at 20:34

## 2018-10-02 RX ADMIN — METOCLOPRAMIDE SCH MG: 5 INJECTION, SOLUTION INTRAMUSCULAR; INTRAVENOUS at 06:29

## 2018-10-02 RX ADMIN — DOBUTAMINE IN DEXTROSE SCH MLS/HR: 100 INJECTION, SOLUTION INTRAVENOUS at 15:00

## 2018-10-02 RX ADMIN — IPRATROPIUM BROMIDE SCH MG: 0.5 SOLUTION RESPIRATORY (INHALATION) at 18:57

## 2018-10-03 VITALS — SYSTOLIC BLOOD PRESSURE: 103 MMHG | DIASTOLIC BLOOD PRESSURE: 69 MMHG

## 2018-10-03 VITALS — DIASTOLIC BLOOD PRESSURE: 70 MMHG | SYSTOLIC BLOOD PRESSURE: 122 MMHG

## 2018-10-03 VITALS — DIASTOLIC BLOOD PRESSURE: 47 MMHG | SYSTOLIC BLOOD PRESSURE: 122 MMHG

## 2018-10-03 VITALS — DIASTOLIC BLOOD PRESSURE: 70 MMHG | SYSTOLIC BLOOD PRESSURE: 113 MMHG

## 2018-10-03 VITALS — SYSTOLIC BLOOD PRESSURE: 112 MMHG | DIASTOLIC BLOOD PRESSURE: 50 MMHG

## 2018-10-03 VITALS — SYSTOLIC BLOOD PRESSURE: 120 MMHG | DIASTOLIC BLOOD PRESSURE: 74 MMHG

## 2018-10-03 VITALS — DIASTOLIC BLOOD PRESSURE: 66 MMHG | SYSTOLIC BLOOD PRESSURE: 109 MMHG

## 2018-10-03 VITALS — DIASTOLIC BLOOD PRESSURE: 47 MMHG | SYSTOLIC BLOOD PRESSURE: 116 MMHG

## 2018-10-03 VITALS — DIASTOLIC BLOOD PRESSURE: 73 MMHG | SYSTOLIC BLOOD PRESSURE: 103 MMHG

## 2018-10-03 VITALS — SYSTOLIC BLOOD PRESSURE: 147 MMHG | DIASTOLIC BLOOD PRESSURE: 80 MMHG

## 2018-10-03 VITALS — DIASTOLIC BLOOD PRESSURE: 46 MMHG | SYSTOLIC BLOOD PRESSURE: 115 MMHG

## 2018-10-03 VITALS — DIASTOLIC BLOOD PRESSURE: 55 MMHG | SYSTOLIC BLOOD PRESSURE: 140 MMHG

## 2018-10-03 VITALS — DIASTOLIC BLOOD PRESSURE: 73 MMHG | SYSTOLIC BLOOD PRESSURE: 118 MMHG

## 2018-10-03 VITALS — DIASTOLIC BLOOD PRESSURE: 65 MMHG | SYSTOLIC BLOOD PRESSURE: 110 MMHG

## 2018-10-03 VITALS — DIASTOLIC BLOOD PRESSURE: 71 MMHG | SYSTOLIC BLOOD PRESSURE: 124 MMHG

## 2018-10-03 VITALS — SYSTOLIC BLOOD PRESSURE: 111 MMHG | DIASTOLIC BLOOD PRESSURE: 72 MMHG

## 2018-10-03 VITALS — SYSTOLIC BLOOD PRESSURE: 115 MMHG | DIASTOLIC BLOOD PRESSURE: 56 MMHG

## 2018-10-03 VITALS — DIASTOLIC BLOOD PRESSURE: 69 MMHG | SYSTOLIC BLOOD PRESSURE: 103 MMHG

## 2018-10-03 VITALS — SYSTOLIC BLOOD PRESSURE: 111 MMHG | DIASTOLIC BLOOD PRESSURE: 59 MMHG

## 2018-10-03 VITALS — DIASTOLIC BLOOD PRESSURE: 71 MMHG | SYSTOLIC BLOOD PRESSURE: 111 MMHG

## 2018-10-03 VITALS — SYSTOLIC BLOOD PRESSURE: 119 MMHG | DIASTOLIC BLOOD PRESSURE: 44 MMHG

## 2018-10-03 VITALS — SYSTOLIC BLOOD PRESSURE: 111 MMHG | DIASTOLIC BLOOD PRESSURE: 68 MMHG

## 2018-10-03 VITALS — SYSTOLIC BLOOD PRESSURE: 124 MMHG | DIASTOLIC BLOOD PRESSURE: 67 MMHG

## 2018-10-03 VITALS — DIASTOLIC BLOOD PRESSURE: 50 MMHG | SYSTOLIC BLOOD PRESSURE: 112 MMHG

## 2018-10-03 VITALS — DIASTOLIC BLOOD PRESSURE: 52 MMHG | SYSTOLIC BLOOD PRESSURE: 99 MMHG

## 2018-10-03 VITALS — DIASTOLIC BLOOD PRESSURE: 57 MMHG | SYSTOLIC BLOOD PRESSURE: 105 MMHG

## 2018-10-03 VITALS — DIASTOLIC BLOOD PRESSURE: 45 MMHG | SYSTOLIC BLOOD PRESSURE: 111 MMHG

## 2018-10-03 VITALS — SYSTOLIC BLOOD PRESSURE: 101 MMHG | DIASTOLIC BLOOD PRESSURE: 71 MMHG

## 2018-10-03 VITALS — DIASTOLIC BLOOD PRESSURE: 48 MMHG | SYSTOLIC BLOOD PRESSURE: 95 MMHG

## 2018-10-03 VITALS — SYSTOLIC BLOOD PRESSURE: 95 MMHG | DIASTOLIC BLOOD PRESSURE: 61 MMHG

## 2018-10-03 VITALS — DIASTOLIC BLOOD PRESSURE: 53 MMHG | SYSTOLIC BLOOD PRESSURE: 112 MMHG

## 2018-10-03 LAB
ANION GAP SERPL CALCULATED.3IONS-SCNC: 6 MMOL/L (ref 5–15)
BUN SERPL-MCNC: 20 MG/DL (ref 7–18)
BUN/CREAT SERPL: 40
CALCIUM SERPL-MCNC: 8.1 MG/DL (ref 8.5–10.1)
CHLORIDE SERPL-SCNC: 98 MMOL/L (ref 98–107)
CO2 SERPL-SCNC: 36 MMOL/L (ref 21–32)
GLUCOSE SERPL-MCNC: 94 MG/DL (ref 74–106)
HCT VFR BLD AUTO: 38.2 % (ref 36–46)
HGB BLD-MCNC: 12.2 G/DL (ref 12.2–16.2)
MCH RBC QN AUTO: 26.3 PG (ref 28–32)
MCV RBC AUTO: 81.6 FL (ref 80–100)
NRBC BLD QL AUTO: 0.1 %
POTASSIUM SERPL-SCNC: 4.1 MMOL/L (ref 3.5–5.1)
SODIUM SERPL-SCNC: 140 MMOL/L (ref 136–145)

## 2018-10-03 RX ADMIN — SPIRONOLACTONE SCH MG: 25 TABLET, FILM COATED ORAL at 18:00

## 2018-10-03 RX ADMIN — SODIUM CHLORIDE SCH ML: 9 INJECTION INTRAMUSCULAR; INTRAVENOUS; SUBCUTANEOUS at 09:34

## 2018-10-03 RX ADMIN — SACUBITRIL AND VALSARTAN SCH TAB: 24; 26 TABLET, FILM COATED ORAL at 22:27

## 2018-10-03 RX ADMIN — DRONEDARONE SCH MG: 400 TABLET, FILM COATED ORAL at 09:34

## 2018-10-03 RX ADMIN — FUROSEMIDE SCH MG: 10 INJECTION, SOLUTION INTRAMUSCULAR; INTRAVENOUS at 18:02

## 2018-10-03 RX ADMIN — APIXABAN SCH MG: 5 TABLET, FILM COATED ORAL at 09:36

## 2018-10-03 RX ADMIN — MORPHINE SULFATE PRN MG: 4 INJECTION, SOLUTION INTRAMUSCULAR; INTRAVENOUS at 11:33

## 2018-10-03 RX ADMIN — PANTOPRAZOLE SODIUM SCH MG: 40 INJECTION, POWDER, FOR SOLUTION INTRAVENOUS at 09:34

## 2018-10-03 RX ADMIN — DOBUTAMINE IN DEXTROSE SCH MLS/HR: 100 INJECTION, SOLUTION INTRAVENOUS at 11:26

## 2018-10-03 RX ADMIN — APIXABAN SCH MG: 5 TABLET, FILM COATED ORAL at 22:08

## 2018-10-03 RX ADMIN — DRONEDARONE SCH MG: 400 TABLET, FILM COATED ORAL at 22:12

## 2018-10-03 RX ADMIN — IPRATROPIUM BROMIDE SCH MG: 0.5 SOLUTION RESPIRATORY (INHALATION) at 06:07

## 2018-10-03 RX ADMIN — ALBUTEROL SULFATE SCH MG: 2.5 SOLUTION RESPIRATORY (INHALATION) at 00:46

## 2018-10-03 RX ADMIN — RANOLAZINE SCH MG: 500 TABLET, FILM COATED, EXTENDED RELEASE ORAL at 09:34

## 2018-10-03 RX ADMIN — SACUBITRIL AND VALSARTAN SCH TAB: 24; 26 TABLET, FILM COATED ORAL at 09:35

## 2018-10-03 RX ADMIN — IPRATROPIUM BROMIDE SCH MG: 0.5 SOLUTION RESPIRATORY (INHALATION) at 12:20

## 2018-10-03 RX ADMIN — ALBUTEROL SULFATE SCH MG: 2.5 SOLUTION RESPIRATORY (INHALATION) at 12:00

## 2018-10-03 RX ADMIN — RANOLAZINE SCH MG: 500 TABLET, FILM COATED, EXTENDED RELEASE ORAL at 22:00

## 2018-10-03 RX ADMIN — ALBUTEROL SULFATE SCH MG: 2.5 SOLUTION RESPIRATORY (INHALATION) at 18:20

## 2018-10-03 RX ADMIN — IPRATROPIUM BROMIDE SCH MG: 0.5 SOLUTION RESPIRATORY (INHALATION) at 18:21

## 2018-10-03 RX ADMIN — SPIRONOLACTONE SCH MG: 25 TABLET, FILM COATED ORAL at 05:55

## 2018-10-03 RX ADMIN — BUDESONIDE SCH MG: 0.5 SUSPENSION RESPIRATORY (INHALATION) at 06:07

## 2018-10-03 RX ADMIN — SODIUM CHLORIDE SCH ML: 9 INJECTION INTRAMUSCULAR; INTRAVENOUS; SUBCUTANEOUS at 22:28

## 2018-10-03 RX ADMIN — BUDESONIDE SCH MG: 0.5 SUSPENSION RESPIRATORY (INHALATION) at 18:20

## 2018-10-03 RX ADMIN — IPRATROPIUM BROMIDE SCH MG: 0.5 SOLUTION RESPIRATORY (INHALATION) at 00:46

## 2018-10-03 RX ADMIN — ALBUTEROL SULFATE SCH MG: 2.5 SOLUTION RESPIRATORY (INHALATION) at 06:07

## 2018-10-03 RX ADMIN — FUROSEMIDE SCH MG: 10 INJECTION, SOLUTION INTRAMUSCULAR; INTRAVENOUS at 05:55

## 2018-10-04 VITALS — SYSTOLIC BLOOD PRESSURE: 87 MMHG | DIASTOLIC BLOOD PRESSURE: 48 MMHG

## 2018-10-04 VITALS — DIASTOLIC BLOOD PRESSURE: 62 MMHG | SYSTOLIC BLOOD PRESSURE: 93 MMHG

## 2018-10-04 VITALS — DIASTOLIC BLOOD PRESSURE: 37 MMHG | SYSTOLIC BLOOD PRESSURE: 78 MMHG

## 2018-10-04 VITALS — DIASTOLIC BLOOD PRESSURE: 55 MMHG | SYSTOLIC BLOOD PRESSURE: 97 MMHG

## 2018-10-04 VITALS — SYSTOLIC BLOOD PRESSURE: 113 MMHG | DIASTOLIC BLOOD PRESSURE: 43 MMHG

## 2018-10-04 VITALS — SYSTOLIC BLOOD PRESSURE: 99 MMHG | DIASTOLIC BLOOD PRESSURE: 61 MMHG

## 2018-10-04 VITALS — DIASTOLIC BLOOD PRESSURE: 68 MMHG | SYSTOLIC BLOOD PRESSURE: 101 MMHG

## 2018-10-04 VITALS — DIASTOLIC BLOOD PRESSURE: 35 MMHG | SYSTOLIC BLOOD PRESSURE: 77 MMHG

## 2018-10-04 VITALS — DIASTOLIC BLOOD PRESSURE: 58 MMHG | SYSTOLIC BLOOD PRESSURE: 102 MMHG

## 2018-10-04 VITALS — DIASTOLIC BLOOD PRESSURE: 51 MMHG | SYSTOLIC BLOOD PRESSURE: 106 MMHG

## 2018-10-04 VITALS — DIASTOLIC BLOOD PRESSURE: 33 MMHG | SYSTOLIC BLOOD PRESSURE: 87 MMHG

## 2018-10-04 VITALS — SYSTOLIC BLOOD PRESSURE: 110 MMHG | DIASTOLIC BLOOD PRESSURE: 54 MMHG

## 2018-10-04 VITALS — SYSTOLIC BLOOD PRESSURE: 114 MMHG | DIASTOLIC BLOOD PRESSURE: 59 MMHG

## 2018-10-04 VITALS — DIASTOLIC BLOOD PRESSURE: 48 MMHG | SYSTOLIC BLOOD PRESSURE: 102 MMHG

## 2018-10-04 VITALS — DIASTOLIC BLOOD PRESSURE: 47 MMHG | SYSTOLIC BLOOD PRESSURE: 89 MMHG

## 2018-10-04 VITALS — SYSTOLIC BLOOD PRESSURE: 138 MMHG | DIASTOLIC BLOOD PRESSURE: 92 MMHG

## 2018-10-04 VITALS — DIASTOLIC BLOOD PRESSURE: 45 MMHG | SYSTOLIC BLOOD PRESSURE: 92 MMHG

## 2018-10-04 VITALS — SYSTOLIC BLOOD PRESSURE: 106 MMHG | DIASTOLIC BLOOD PRESSURE: 60 MMHG

## 2018-10-04 VITALS — DIASTOLIC BLOOD PRESSURE: 46 MMHG | SYSTOLIC BLOOD PRESSURE: 90 MMHG

## 2018-10-04 VITALS — DIASTOLIC BLOOD PRESSURE: 47 MMHG | SYSTOLIC BLOOD PRESSURE: 97 MMHG

## 2018-10-04 VITALS — SYSTOLIC BLOOD PRESSURE: 94 MMHG | DIASTOLIC BLOOD PRESSURE: 58 MMHG

## 2018-10-04 VITALS — SYSTOLIC BLOOD PRESSURE: 96 MMHG | DIASTOLIC BLOOD PRESSURE: 65 MMHG

## 2018-10-04 VITALS — SYSTOLIC BLOOD PRESSURE: 98 MMHG | DIASTOLIC BLOOD PRESSURE: 50 MMHG

## 2018-10-04 VITALS — DIASTOLIC BLOOD PRESSURE: 48 MMHG | SYSTOLIC BLOOD PRESSURE: 87 MMHG

## 2018-10-04 VITALS — DIASTOLIC BLOOD PRESSURE: 88 MMHG | SYSTOLIC BLOOD PRESSURE: 120 MMHG

## 2018-10-04 VITALS — SYSTOLIC BLOOD PRESSURE: 97 MMHG | DIASTOLIC BLOOD PRESSURE: 55 MMHG

## 2018-10-04 VITALS — SYSTOLIC BLOOD PRESSURE: 93 MMHG | DIASTOLIC BLOOD PRESSURE: 52 MMHG

## 2018-10-04 VITALS — DIASTOLIC BLOOD PRESSURE: 57 MMHG | SYSTOLIC BLOOD PRESSURE: 91 MMHG

## 2018-10-04 VITALS — DIASTOLIC BLOOD PRESSURE: 59 MMHG | SYSTOLIC BLOOD PRESSURE: 99 MMHG

## 2018-10-04 VITALS — DIASTOLIC BLOOD PRESSURE: 71 MMHG | SYSTOLIC BLOOD PRESSURE: 121 MMHG

## 2018-10-04 VITALS — DIASTOLIC BLOOD PRESSURE: 69 MMHG | SYSTOLIC BLOOD PRESSURE: 101 MMHG

## 2018-10-04 VITALS — SYSTOLIC BLOOD PRESSURE: 108 MMHG | DIASTOLIC BLOOD PRESSURE: 57 MMHG

## 2018-10-04 VITALS — SYSTOLIC BLOOD PRESSURE: 102 MMHG | DIASTOLIC BLOOD PRESSURE: 39 MMHG

## 2018-10-04 VITALS — SYSTOLIC BLOOD PRESSURE: 80 MMHG | DIASTOLIC BLOOD PRESSURE: 49 MMHG

## 2018-10-04 VITALS — SYSTOLIC BLOOD PRESSURE: 83 MMHG | DIASTOLIC BLOOD PRESSURE: 43 MMHG

## 2018-10-04 VITALS — DIASTOLIC BLOOD PRESSURE: 49 MMHG | SYSTOLIC BLOOD PRESSURE: 89 MMHG

## 2018-10-04 VITALS — SYSTOLIC BLOOD PRESSURE: 105 MMHG | DIASTOLIC BLOOD PRESSURE: 53 MMHG

## 2018-10-04 VITALS — DIASTOLIC BLOOD PRESSURE: 41 MMHG | SYSTOLIC BLOOD PRESSURE: 85 MMHG

## 2018-10-04 VITALS — DIASTOLIC BLOOD PRESSURE: 46 MMHG | SYSTOLIC BLOOD PRESSURE: 93 MMHG

## 2018-10-04 VITALS — SYSTOLIC BLOOD PRESSURE: 78 MMHG | DIASTOLIC BLOOD PRESSURE: 42 MMHG

## 2018-10-04 VITALS — SYSTOLIC BLOOD PRESSURE: 90 MMHG | DIASTOLIC BLOOD PRESSURE: 39 MMHG

## 2018-10-04 VITALS — SYSTOLIC BLOOD PRESSURE: 85 MMHG | DIASTOLIC BLOOD PRESSURE: 49 MMHG

## 2018-10-04 VITALS — SYSTOLIC BLOOD PRESSURE: 80 MMHG | DIASTOLIC BLOOD PRESSURE: 45 MMHG

## 2018-10-04 VITALS — DIASTOLIC BLOOD PRESSURE: 66 MMHG | SYSTOLIC BLOOD PRESSURE: 91 MMHG

## 2018-10-04 VITALS — SYSTOLIC BLOOD PRESSURE: 90 MMHG | DIASTOLIC BLOOD PRESSURE: 45 MMHG

## 2018-10-04 VITALS — DIASTOLIC BLOOD PRESSURE: 52 MMHG | SYSTOLIC BLOOD PRESSURE: 93 MMHG

## 2018-10-04 VITALS — SYSTOLIC BLOOD PRESSURE: 93 MMHG | DIASTOLIC BLOOD PRESSURE: 42 MMHG

## 2018-10-04 VITALS — SYSTOLIC BLOOD PRESSURE: 107 MMHG | DIASTOLIC BLOOD PRESSURE: 54 MMHG

## 2018-10-04 LAB
ANION GAP SERPL CALCULATED.3IONS-SCNC: 7 MMOL/L (ref 5–15)
BUN SERPL-MCNC: 24 MG/DL (ref 7–18)
BUN/CREAT SERPL: 45.3
CALCIUM SERPL-MCNC: 8.5 MG/DL (ref 8.5–10.1)
CHLORIDE SERPL-SCNC: 97 MMOL/L (ref 98–107)
CO2 SERPL-SCNC: 37 MMOL/L (ref 21–32)
GLUCOSE SERPL-MCNC: 80 MG/DL (ref 74–106)
POTASSIUM SERPL-SCNC: 3.6 MMOL/L (ref 3.5–5.1)
SODIUM SERPL-SCNC: 141 MMOL/L (ref 136–145)

## 2018-10-04 RX ADMIN — FUROSEMIDE SCH MG: 10 INJECTION, SOLUTION INTRAMUSCULAR; INTRAVENOUS at 06:19

## 2018-10-04 RX ADMIN — BUDESONIDE SCH MG: 0.5 SUSPENSION RESPIRATORY (INHALATION) at 18:26

## 2018-10-04 RX ADMIN — ALBUTEROL SULFATE SCH MG: 2.5 SOLUTION RESPIRATORY (INHALATION) at 18:25

## 2018-10-04 RX ADMIN — IPRATROPIUM BROMIDE SCH MG: 0.5 SOLUTION RESPIRATORY (INHALATION) at 11:38

## 2018-10-04 RX ADMIN — IPRATROPIUM BROMIDE SCH MG: 0.5 SOLUTION RESPIRATORY (INHALATION) at 18:25

## 2018-10-04 RX ADMIN — SODIUM CHLORIDE SCH ML: 9 INJECTION INTRAMUSCULAR; INTRAVENOUS; SUBCUTANEOUS at 10:18

## 2018-10-04 RX ADMIN — RANOLAZINE SCH MG: 500 TABLET, FILM COATED, EXTENDED RELEASE ORAL at 10:00

## 2018-10-04 RX ADMIN — ALBUTEROL SULFATE SCH MG: 2.5 SOLUTION RESPIRATORY (INHALATION) at 06:51

## 2018-10-04 RX ADMIN — SACUBITRIL AND VALSARTAN SCH TAB: 24; 26 TABLET, FILM COATED ORAL at 10:17

## 2018-10-04 RX ADMIN — DRONEDARONE SCH MG: 400 TABLET, FILM COATED ORAL at 10:17

## 2018-10-04 RX ADMIN — BUDESONIDE SCH MG: 0.5 SUSPENSION RESPIRATORY (INHALATION) at 06:51

## 2018-10-04 RX ADMIN — RANOLAZINE SCH MG: 500 TABLET, FILM COATED, EXTENDED RELEASE ORAL at 22:00

## 2018-10-04 RX ADMIN — FUROSEMIDE SCH MG: 10 INJECTION, SOLUTION INTRAMUSCULAR; INTRAVENOUS at 17:48

## 2018-10-04 RX ADMIN — SPIRONOLACTONE SCH MG: 25 TABLET, FILM COATED ORAL at 18:00

## 2018-10-04 RX ADMIN — SODIUM CHLORIDE SCH ML: 9 INJECTION INTRAMUSCULAR; INTRAVENOUS; SUBCUTANEOUS at 22:07

## 2018-10-04 RX ADMIN — APIXABAN SCH MG: 5 TABLET, FILM COATED ORAL at 22:07

## 2018-10-04 RX ADMIN — IPRATROPIUM BROMIDE SCH MG: 0.5 SOLUTION RESPIRATORY (INHALATION) at 00:19

## 2018-10-04 RX ADMIN — ALBUTEROL SULFATE SCH MG: 2.5 SOLUTION RESPIRATORY (INHALATION) at 00:19

## 2018-10-04 RX ADMIN — PANTOPRAZOLE SODIUM SCH MG: 40 INJECTION, POWDER, FOR SOLUTION INTRAVENOUS at 10:17

## 2018-10-04 RX ADMIN — DRONEDARONE SCH MG: 400 TABLET, FILM COATED ORAL at 22:07

## 2018-10-04 RX ADMIN — ALBUTEROL SULFATE SCH MG: 2.5 SOLUTION RESPIRATORY (INHALATION) at 11:37

## 2018-10-04 RX ADMIN — SPIRONOLACTONE SCH MG: 25 TABLET, FILM COATED ORAL at 06:18

## 2018-10-04 RX ADMIN — IPRATROPIUM BROMIDE SCH MG: 0.5 SOLUTION RESPIRATORY (INHALATION) at 06:51

## 2018-10-04 RX ADMIN — APIXABAN SCH MG: 5 TABLET, FILM COATED ORAL at 10:17

## 2018-10-05 VITALS — DIASTOLIC BLOOD PRESSURE: 44 MMHG | SYSTOLIC BLOOD PRESSURE: 86 MMHG

## 2018-10-05 VITALS — DIASTOLIC BLOOD PRESSURE: 68 MMHG | SYSTOLIC BLOOD PRESSURE: 98 MMHG

## 2018-10-05 VITALS — SYSTOLIC BLOOD PRESSURE: 103 MMHG | DIASTOLIC BLOOD PRESSURE: 63 MMHG

## 2018-10-05 VITALS — DIASTOLIC BLOOD PRESSURE: 56 MMHG | SYSTOLIC BLOOD PRESSURE: 97 MMHG

## 2018-10-05 VITALS — DIASTOLIC BLOOD PRESSURE: 57 MMHG | SYSTOLIC BLOOD PRESSURE: 107 MMHG

## 2018-10-05 VITALS — DIASTOLIC BLOOD PRESSURE: 56 MMHG | SYSTOLIC BLOOD PRESSURE: 89 MMHG

## 2018-10-05 VITALS — SYSTOLIC BLOOD PRESSURE: 96 MMHG | DIASTOLIC BLOOD PRESSURE: 49 MMHG

## 2018-10-05 VITALS — DIASTOLIC BLOOD PRESSURE: 45 MMHG | SYSTOLIC BLOOD PRESSURE: 94 MMHG

## 2018-10-05 VITALS — DIASTOLIC BLOOD PRESSURE: 50 MMHG | SYSTOLIC BLOOD PRESSURE: 93 MMHG

## 2018-10-05 VITALS — DIASTOLIC BLOOD PRESSURE: 47 MMHG | SYSTOLIC BLOOD PRESSURE: 86 MMHG

## 2018-10-05 VITALS — DIASTOLIC BLOOD PRESSURE: 60 MMHG | SYSTOLIC BLOOD PRESSURE: 89 MMHG

## 2018-10-05 VITALS — SYSTOLIC BLOOD PRESSURE: 90 MMHG | DIASTOLIC BLOOD PRESSURE: 36 MMHG

## 2018-10-05 VITALS — DIASTOLIC BLOOD PRESSURE: 38 MMHG | SYSTOLIC BLOOD PRESSURE: 91 MMHG

## 2018-10-05 VITALS — DIASTOLIC BLOOD PRESSURE: 63 MMHG | SYSTOLIC BLOOD PRESSURE: 122 MMHG

## 2018-10-05 VITALS — SYSTOLIC BLOOD PRESSURE: 127 MMHG | DIASTOLIC BLOOD PRESSURE: 67 MMHG

## 2018-10-05 VITALS — SYSTOLIC BLOOD PRESSURE: 94 MMHG | DIASTOLIC BLOOD PRESSURE: 64 MMHG

## 2018-10-05 VITALS — SYSTOLIC BLOOD PRESSURE: 128 MMHG | DIASTOLIC BLOOD PRESSURE: 75 MMHG

## 2018-10-05 VITALS — DIASTOLIC BLOOD PRESSURE: 52 MMHG | SYSTOLIC BLOOD PRESSURE: 94 MMHG

## 2018-10-05 VITALS — DIASTOLIC BLOOD PRESSURE: 63 MMHG | SYSTOLIC BLOOD PRESSURE: 103 MMHG

## 2018-10-05 VITALS — DIASTOLIC BLOOD PRESSURE: 44 MMHG | SYSTOLIC BLOOD PRESSURE: 91 MMHG

## 2018-10-05 VITALS — SYSTOLIC BLOOD PRESSURE: 91 MMHG | DIASTOLIC BLOOD PRESSURE: 32 MMHG

## 2018-10-05 VITALS — SYSTOLIC BLOOD PRESSURE: 86 MMHG | DIASTOLIC BLOOD PRESSURE: 47 MMHG

## 2018-10-05 VITALS — DIASTOLIC BLOOD PRESSURE: 68 MMHG | SYSTOLIC BLOOD PRESSURE: 96 MMHG

## 2018-10-05 VITALS — DIASTOLIC BLOOD PRESSURE: 75 MMHG | SYSTOLIC BLOOD PRESSURE: 128 MMHG

## 2018-10-05 VITALS — SYSTOLIC BLOOD PRESSURE: 107 MMHG | DIASTOLIC BLOOD PRESSURE: 63 MMHG

## 2018-10-05 VITALS — SYSTOLIC BLOOD PRESSURE: 93 MMHG | DIASTOLIC BLOOD PRESSURE: 65 MMHG

## 2018-10-05 VITALS — DIASTOLIC BLOOD PRESSURE: 53 MMHG | SYSTOLIC BLOOD PRESSURE: 100 MMHG

## 2018-10-05 VITALS — SYSTOLIC BLOOD PRESSURE: 97 MMHG | DIASTOLIC BLOOD PRESSURE: 37 MMHG

## 2018-10-05 VITALS — SYSTOLIC BLOOD PRESSURE: 94 MMHG | DIASTOLIC BLOOD PRESSURE: 52 MMHG

## 2018-10-05 VITALS — SYSTOLIC BLOOD PRESSURE: 102 MMHG | DIASTOLIC BLOOD PRESSURE: 43 MMHG

## 2018-10-05 VITALS — DIASTOLIC BLOOD PRESSURE: 53 MMHG | SYSTOLIC BLOOD PRESSURE: 99 MMHG

## 2018-10-05 VITALS — DIASTOLIC BLOOD PRESSURE: 36 MMHG | SYSTOLIC BLOOD PRESSURE: 77 MMHG

## 2018-10-05 VITALS — DIASTOLIC BLOOD PRESSURE: 45 MMHG | SYSTOLIC BLOOD PRESSURE: 100 MMHG

## 2018-10-05 VITALS — DIASTOLIC BLOOD PRESSURE: 57 MMHG | SYSTOLIC BLOOD PRESSURE: 100 MMHG

## 2018-10-05 VITALS — DIASTOLIC BLOOD PRESSURE: 46 MMHG | SYSTOLIC BLOOD PRESSURE: 90 MMHG

## 2018-10-05 VITALS — SYSTOLIC BLOOD PRESSURE: 79 MMHG | DIASTOLIC BLOOD PRESSURE: 33 MMHG

## 2018-10-05 VITALS — DIASTOLIC BLOOD PRESSURE: 48 MMHG | SYSTOLIC BLOOD PRESSURE: 102 MMHG

## 2018-10-05 VITALS — DIASTOLIC BLOOD PRESSURE: 36 MMHG | SYSTOLIC BLOOD PRESSURE: 90 MMHG

## 2018-10-05 VITALS — DIASTOLIC BLOOD PRESSURE: 50 MMHG | SYSTOLIC BLOOD PRESSURE: 91 MMHG

## 2018-10-05 VITALS — SYSTOLIC BLOOD PRESSURE: 122 MMHG | DIASTOLIC BLOOD PRESSURE: 63 MMHG

## 2018-10-05 VITALS — SYSTOLIC BLOOD PRESSURE: 92 MMHG | DIASTOLIC BLOOD PRESSURE: 36 MMHG

## 2018-10-05 LAB
ANION GAP SERPL CALCULATED.3IONS-SCNC: 5 MMOL/L (ref 5–15)
BUN SERPL-MCNC: 25 MG/DL (ref 7–18)
BUN/CREAT SERPL: 36.2
CALCIUM SERPL-MCNC: 8.6 MG/DL (ref 8.5–10.1)
CHLORIDE SERPL-SCNC: 97 MMOL/L (ref 98–107)
CO2 SERPL-SCNC: 38 MMOL/L (ref 21–32)
GLUCOSE SERPL-MCNC: 120 MG/DL (ref 74–106)
POTASSIUM SERPL-SCNC: 3.6 MMOL/L (ref 3.5–5.1)
SODIUM SERPL-SCNC: 140 MMOL/L (ref 136–145)

## 2018-10-05 RX ADMIN — IPRATROPIUM BROMIDE SCH MG: 0.5 SOLUTION RESPIRATORY (INHALATION) at 00:33

## 2018-10-05 RX ADMIN — SPIRONOLACTONE SCH MG: 25 TABLET, FILM COATED ORAL at 05:42

## 2018-10-05 RX ADMIN — FUROSEMIDE SCH MG: 10 INJECTION, SOLUTION INTRAMUSCULAR; INTRAVENOUS at 18:01

## 2018-10-05 RX ADMIN — DRONEDARONE SCH MG: 400 TABLET, FILM COATED ORAL at 21:19

## 2018-10-05 RX ADMIN — FUROSEMIDE SCH MG: 10 INJECTION, SOLUTION INTRAMUSCULAR; INTRAVENOUS at 06:07

## 2018-10-05 RX ADMIN — IPRATROPIUM BROMIDE SCH MG: 0.5 SOLUTION RESPIRATORY (INHALATION) at 07:52

## 2018-10-05 RX ADMIN — ALBUTEROL SULFATE SCH MG: 2.5 SOLUTION RESPIRATORY (INHALATION) at 12:00

## 2018-10-05 RX ADMIN — SPIRONOLACTONE SCH MG: 25 TABLET, FILM COATED ORAL at 18:01

## 2018-10-05 RX ADMIN — PANTOPRAZOLE SODIUM SCH MG: 40 TABLET, DELAYED RELEASE ORAL at 10:22

## 2018-10-05 RX ADMIN — SODIUM CHLORIDE SCH ML: 9 INJECTION INTRAMUSCULAR; INTRAVENOUS; SUBCUTANEOUS at 21:20

## 2018-10-05 RX ADMIN — DRONEDARONE SCH MG: 400 TABLET, FILM COATED ORAL at 10:21

## 2018-10-05 RX ADMIN — HYDROCODONE BITARTRATE AND ACETAMINOPHEN PRN TAB: 5; 325 TABLET ORAL at 21:20

## 2018-10-05 RX ADMIN — ALBUTEROL SULFATE SCH MG: 2.5 SOLUTION RESPIRATORY (INHALATION) at 00:33

## 2018-10-05 RX ADMIN — APIXABAN SCH MG: 5 TABLET, FILM COATED ORAL at 10:22

## 2018-10-05 RX ADMIN — SODIUM CHLORIDE SCH ML: 9 INJECTION INTRAMUSCULAR; INTRAVENOUS; SUBCUTANEOUS at 10:22

## 2018-10-05 RX ADMIN — IPRATROPIUM BROMIDE SCH MG: 0.5 SOLUTION RESPIRATORY (INHALATION) at 18:48

## 2018-10-05 RX ADMIN — ALBUTEROL SULFATE SCH MG: 2.5 SOLUTION RESPIRATORY (INHALATION) at 18:48

## 2018-10-05 RX ADMIN — IPRATROPIUM BROMIDE SCH MG: 0.5 SOLUTION RESPIRATORY (INHALATION) at 12:00

## 2018-10-05 RX ADMIN — BUDESONIDE SCH MG: 0.5 SUSPENSION RESPIRATORY (INHALATION) at 07:52

## 2018-10-05 RX ADMIN — APIXABAN SCH MG: 5 TABLET, FILM COATED ORAL at 21:19

## 2018-10-05 RX ADMIN — RANOLAZINE SCH MG: 500 TABLET, FILM COATED, EXTENDED RELEASE ORAL at 10:21

## 2018-10-05 RX ADMIN — ALBUTEROL SULFATE SCH MG: 2.5 SOLUTION RESPIRATORY (INHALATION) at 07:52

## 2018-10-05 RX ADMIN — BUDESONIDE SCH MG: 0.5 SUSPENSION RESPIRATORY (INHALATION) at 18:48

## 2018-10-05 RX ADMIN — RANOLAZINE SCH MG: 500 TABLET, FILM COATED, EXTENDED RELEASE ORAL at 21:19

## 2018-10-06 VITALS — SYSTOLIC BLOOD PRESSURE: 111 MMHG | DIASTOLIC BLOOD PRESSURE: 58 MMHG

## 2018-10-06 VITALS — DIASTOLIC BLOOD PRESSURE: 62 MMHG | SYSTOLIC BLOOD PRESSURE: 115 MMHG

## 2018-10-06 VITALS — DIASTOLIC BLOOD PRESSURE: 59 MMHG | SYSTOLIC BLOOD PRESSURE: 107 MMHG

## 2018-10-06 VITALS — DIASTOLIC BLOOD PRESSURE: 49 MMHG | SYSTOLIC BLOOD PRESSURE: 99 MMHG

## 2018-10-06 VITALS — DIASTOLIC BLOOD PRESSURE: 40 MMHG | SYSTOLIC BLOOD PRESSURE: 101 MMHG

## 2018-10-06 VITALS — SYSTOLIC BLOOD PRESSURE: 97 MMHG | DIASTOLIC BLOOD PRESSURE: 67 MMHG

## 2018-10-06 RX ADMIN — APIXABAN SCH MG: 5 TABLET, FILM COATED ORAL at 21:00

## 2018-10-06 RX ADMIN — SODIUM CHLORIDE SCH ML: 9 INJECTION INTRAMUSCULAR; INTRAVENOUS; SUBCUTANEOUS at 21:01

## 2018-10-06 RX ADMIN — SPIRONOLACTONE SCH MG: 25 TABLET, FILM COATED ORAL at 18:31

## 2018-10-06 RX ADMIN — IPRATROPIUM BROMIDE SCH MG: 0.5 SOLUTION RESPIRATORY (INHALATION) at 13:27

## 2018-10-06 RX ADMIN — DRONEDARONE SCH MG: 400 TABLET, FILM COATED ORAL at 09:51

## 2018-10-06 RX ADMIN — APIXABAN SCH MG: 5 TABLET, FILM COATED ORAL at 09:51

## 2018-10-06 RX ADMIN — SODIUM CHLORIDE SCH ML: 9 INJECTION INTRAMUSCULAR; INTRAVENOUS; SUBCUTANEOUS at 09:51

## 2018-10-06 RX ADMIN — HYDROCODONE BITARTRATE AND ACETAMINOPHEN PRN TAB: 5; 325 TABLET ORAL at 21:01

## 2018-10-06 RX ADMIN — PANTOPRAZOLE SODIUM SCH MG: 40 TABLET, DELAYED RELEASE ORAL at 09:51

## 2018-10-06 RX ADMIN — ALBUTEROL SULFATE SCH MG: 2.5 SOLUTION RESPIRATORY (INHALATION) at 13:27

## 2018-10-06 RX ADMIN — FUROSEMIDE SCH MG: 10 INJECTION, SOLUTION INTRAMUSCULAR; INTRAVENOUS at 05:45

## 2018-10-06 RX ADMIN — IPRATROPIUM BROMIDE SCH MG: 0.5 SOLUTION RESPIRATORY (INHALATION) at 00:43

## 2018-10-06 RX ADMIN — IPRATROPIUM BROMIDE SCH MG: 0.5 SOLUTION RESPIRATORY (INHALATION) at 18:41

## 2018-10-06 RX ADMIN — BUDESONIDE SCH MG: 0.5 SUSPENSION RESPIRATORY (INHALATION) at 06:45

## 2018-10-06 RX ADMIN — IPRATROPIUM BROMIDE SCH MG: 0.5 SOLUTION RESPIRATORY (INHALATION) at 06:45

## 2018-10-06 RX ADMIN — ALBUTEROL SULFATE SCH MG: 2.5 SOLUTION RESPIRATORY (INHALATION) at 00:43

## 2018-10-06 RX ADMIN — BUDESONIDE SCH MG: 0.5 SUSPENSION RESPIRATORY (INHALATION) at 18:41

## 2018-10-06 RX ADMIN — FUROSEMIDE SCH MG: 10 INJECTION, SOLUTION INTRAMUSCULAR; INTRAVENOUS at 18:31

## 2018-10-06 RX ADMIN — ALBUTEROL SULFATE SCH MG: 2.5 SOLUTION RESPIRATORY (INHALATION) at 06:45

## 2018-10-06 RX ADMIN — HYDROCODONE BITARTRATE AND ACETAMINOPHEN PRN TAB: 5; 325 TABLET ORAL at 03:02

## 2018-10-06 RX ADMIN — LISINOPRIL SCH MG: 10 TABLET ORAL at 09:52

## 2018-10-06 RX ADMIN — SPIRONOLACTONE SCH MG: 25 TABLET, FILM COATED ORAL at 05:45

## 2018-10-06 RX ADMIN — ALBUTEROL SULFATE SCH MG: 2.5 SOLUTION RESPIRATORY (INHALATION) at 18:41

## 2018-10-06 RX ADMIN — DRONEDARONE SCH MG: 400 TABLET, FILM COATED ORAL at 21:01

## 2018-10-06 RX ADMIN — RANOLAZINE SCH MG: 500 TABLET, FILM COATED, EXTENDED RELEASE ORAL at 21:01

## 2018-10-06 RX ADMIN — RANOLAZINE SCH MG: 500 TABLET, FILM COATED, EXTENDED RELEASE ORAL at 09:51

## 2018-10-07 VITALS — SYSTOLIC BLOOD PRESSURE: 96 MMHG | DIASTOLIC BLOOD PRESSURE: 53 MMHG

## 2018-10-07 VITALS — SYSTOLIC BLOOD PRESSURE: 99 MMHG | DIASTOLIC BLOOD PRESSURE: 64 MMHG

## 2018-10-07 VITALS — DIASTOLIC BLOOD PRESSURE: 47 MMHG | SYSTOLIC BLOOD PRESSURE: 86 MMHG

## 2018-10-07 VITALS — DIASTOLIC BLOOD PRESSURE: 63 MMHG | SYSTOLIC BLOOD PRESSURE: 105 MMHG

## 2018-10-07 VITALS — DIASTOLIC BLOOD PRESSURE: 56 MMHG | SYSTOLIC BLOOD PRESSURE: 94 MMHG

## 2018-10-07 LAB
ANION GAP SERPL CALCULATED.3IONS-SCNC: 7 MMOL/L (ref 5–15)
BUN SERPL-MCNC: 21 MG/DL (ref 7–18)
BUN/CREAT SERPL: 22.3
CALCIUM SERPL-MCNC: 8.3 MG/DL (ref 8.5–10.1)
CHLORIDE SERPL-SCNC: 93 MMOL/L (ref 98–107)
CO2 SERPL-SCNC: 37 MMOL/L (ref 21–32)
GLUCOSE SERPL-MCNC: 110 MG/DL (ref 74–106)
HCT VFR BLD AUTO: 38.2 % (ref 36–46)
HGB BLD-MCNC: 12.7 G/DL (ref 12.2–16.2)
MCH RBC QN AUTO: 26.6 PG (ref 28–32)
MCV RBC AUTO: 80.4 FL (ref 80–100)
NRBC BLD QL AUTO: 0.3 %
POTASSIUM SERPL-SCNC: 3.4 MMOL/L (ref 3.5–5.1)
SODIUM SERPL-SCNC: 137 MMOL/L (ref 136–145)

## 2018-10-07 RX ADMIN — BUDESONIDE SCH MG: 0.5 SUSPENSION RESPIRATORY (INHALATION) at 18:10

## 2018-10-07 RX ADMIN — FUROSEMIDE SCH MG: 10 INJECTION, SOLUTION INTRAMUSCULAR; INTRAVENOUS at 06:23

## 2018-10-07 RX ADMIN — Medication PRN ML: at 21:14

## 2018-10-07 RX ADMIN — IPRATROPIUM BROMIDE SCH MG: 0.5 SOLUTION RESPIRATORY (INHALATION) at 18:10

## 2018-10-07 RX ADMIN — SPIRONOLACTONE SCH MG: 25 TABLET, FILM COATED ORAL at 06:23

## 2018-10-07 RX ADMIN — SPIRONOLACTONE SCH MG: 25 TABLET, FILM COATED ORAL at 18:54

## 2018-10-07 RX ADMIN — BUDESONIDE SCH MG: 0.5 SUSPENSION RESPIRATORY (INHALATION) at 07:27

## 2018-10-07 RX ADMIN — APIXABAN SCH MG: 5 TABLET, FILM COATED ORAL at 09:37

## 2018-10-07 RX ADMIN — APIXABAN SCH MG: 5 TABLET, FILM COATED ORAL at 21:14

## 2018-10-07 RX ADMIN — RANOLAZINE SCH MG: 500 TABLET, FILM COATED, EXTENDED RELEASE ORAL at 21:14

## 2018-10-07 RX ADMIN — SODIUM CHLORIDE SCH ML: 9 INJECTION INTRAMUSCULAR; INTRAVENOUS; SUBCUTANEOUS at 21:15

## 2018-10-07 RX ADMIN — DRONEDARONE SCH MG: 400 TABLET, FILM COATED ORAL at 09:37

## 2018-10-07 RX ADMIN — IPRATROPIUM BROMIDE SCH MG: 0.5 SOLUTION RESPIRATORY (INHALATION) at 13:33

## 2018-10-07 RX ADMIN — ALBUTEROL SULFATE SCH MG: 2.5 SOLUTION RESPIRATORY (INHALATION) at 13:33

## 2018-10-07 RX ADMIN — ALBUTEROL SULFATE SCH MG: 2.5 SOLUTION RESPIRATORY (INHALATION) at 00:00

## 2018-10-07 RX ADMIN — ALBUTEROL SULFATE SCH MG: 2.5 SOLUTION RESPIRATORY (INHALATION) at 07:26

## 2018-10-07 RX ADMIN — ALBUTEROL SULFATE SCH MG: 2.5 SOLUTION RESPIRATORY (INHALATION) at 18:10

## 2018-10-07 RX ADMIN — SODIUM CHLORIDE SCH ML: 9 INJECTION INTRAMUSCULAR; INTRAVENOUS; SUBCUTANEOUS at 09:38

## 2018-10-07 RX ADMIN — LISINOPRIL SCH MG: 10 TABLET ORAL at 09:43

## 2018-10-07 RX ADMIN — IPRATROPIUM BROMIDE SCH MG: 0.5 SOLUTION RESPIRATORY (INHALATION) at 00:00

## 2018-10-07 RX ADMIN — IPRATROPIUM BROMIDE SCH MG: 0.5 SOLUTION RESPIRATORY (INHALATION) at 07:27

## 2018-10-07 RX ADMIN — DRONEDARONE SCH MG: 400 TABLET, FILM COATED ORAL at 21:13

## 2018-10-07 RX ADMIN — RANOLAZINE SCH MG: 500 TABLET, FILM COATED, EXTENDED RELEASE ORAL at 09:38

## 2018-10-07 RX ADMIN — HYDROCODONE BITARTRATE AND ACETAMINOPHEN PRN TAB: 5; 325 TABLET ORAL at 09:38

## 2018-10-07 RX ADMIN — FUROSEMIDE SCH MG: 10 INJECTION, SOLUTION INTRAMUSCULAR; INTRAVENOUS at 18:00

## 2018-10-07 RX ADMIN — HYDROCODONE BITARTRATE AND ACETAMINOPHEN PRN TAB: 5; 325 TABLET ORAL at 21:19

## 2018-10-07 RX ADMIN — PANTOPRAZOLE SODIUM SCH MG: 40 TABLET, DELAYED RELEASE ORAL at 09:38

## 2018-10-08 VITALS — DIASTOLIC BLOOD PRESSURE: 52 MMHG | SYSTOLIC BLOOD PRESSURE: 101 MMHG

## 2018-10-08 VITALS — SYSTOLIC BLOOD PRESSURE: 97 MMHG | DIASTOLIC BLOOD PRESSURE: 55 MMHG

## 2018-10-08 VITALS — SYSTOLIC BLOOD PRESSURE: 105 MMHG | DIASTOLIC BLOOD PRESSURE: 63 MMHG

## 2018-10-08 VITALS — DIASTOLIC BLOOD PRESSURE: 80 MMHG | SYSTOLIC BLOOD PRESSURE: 105 MMHG

## 2018-10-08 VITALS — DIASTOLIC BLOOD PRESSURE: 64 MMHG | SYSTOLIC BLOOD PRESSURE: 107 MMHG

## 2018-10-08 LAB
ALBUMIN SERPL-MCNC: 2.9 G/DL (ref 3.4–5)
ALP SERPL-CCNC: 164 U/L (ref 45–117)
ALT SERPL-CCNC: 40 U/L (ref 13–56)
ANION GAP SERPL CALCULATED.3IONS-SCNC: 8 MMOL/L (ref 5–15)
BILIRUB SERPL-MCNC: 0.9 MG/DL (ref 0.2–1)
BUN SERPL-MCNC: 26 MG/DL (ref 7–18)
BUN/CREAT SERPL: 23.2
CALCIUM SERPL-MCNC: 8.3 MG/DL (ref 8.5–10.1)
CHLORIDE SERPL-SCNC: 94 MMOL/L (ref 98–107)
CO2 SERPL-SCNC: 33 MMOL/L (ref 21–32)
GLUCOSE SERPL-MCNC: 76 MG/DL (ref 74–106)
MAGNESIUM SERPL-MCNC: 1.6 MG/DL (ref 1.6–2.6)
POTASSIUM SERPL-SCNC: 3.4 MMOL/L (ref 3.5–5.1)
PROT SERPL-MCNC: 6.7 G/DL (ref 6.4–8.2)
SODIUM SERPL-SCNC: 135 MMOL/L (ref 136–145)

## 2018-10-08 RX ADMIN — DRONEDARONE SCH MG: 400 TABLET, FILM COATED ORAL at 21:00

## 2018-10-08 RX ADMIN — ALBUTEROL SULFATE SCH MG: 2.5 SOLUTION RESPIRATORY (INHALATION) at 06:43

## 2018-10-08 RX ADMIN — ALBUTEROL SULFATE SCH MG: 2.5 SOLUTION RESPIRATORY (INHALATION) at 11:21

## 2018-10-08 RX ADMIN — SPIRONOLACTONE SCH MG: 25 TABLET, FILM COATED ORAL at 05:52

## 2018-10-08 RX ADMIN — APIXABAN SCH MG: 5 TABLET, FILM COATED ORAL at 09:32

## 2018-10-08 RX ADMIN — DRONEDARONE SCH MG: 400 TABLET, FILM COATED ORAL at 09:32

## 2018-10-08 RX ADMIN — SODIUM CHLORIDE SCH ML: 9 INJECTION INTRAMUSCULAR; INTRAVENOUS; SUBCUTANEOUS at 21:00

## 2018-10-08 RX ADMIN — POTASSIUM CHLORIDE SCH MEQ: 1500 TABLET, EXTENDED RELEASE ORAL at 21:00

## 2018-10-08 RX ADMIN — RANOLAZINE SCH MG: 500 TABLET, FILM COATED, EXTENDED RELEASE ORAL at 21:00

## 2018-10-08 RX ADMIN — IPRATROPIUM BROMIDE SCH MG: 0.5 SOLUTION RESPIRATORY (INHALATION) at 00:56

## 2018-10-08 RX ADMIN — LISINOPRIL SCH MG: 10 TABLET ORAL at 09:34

## 2018-10-08 RX ADMIN — ALBUTEROL SULFATE SCH MG: 2.5 SOLUTION RESPIRATORY (INHALATION) at 00:56

## 2018-10-08 RX ADMIN — SPIRONOLACTONE SCH MG: 25 TABLET, FILM COATED ORAL at 18:03

## 2018-10-08 RX ADMIN — IPRATROPIUM BROMIDE SCH MG: 0.5 SOLUTION RESPIRATORY (INHALATION) at 06:43

## 2018-10-08 RX ADMIN — BUDESONIDE SCH MG: 0.5 SUSPENSION RESPIRATORY (INHALATION) at 18:36

## 2018-10-08 RX ADMIN — BUDESONIDE SCH MG: 0.5 SUSPENSION RESPIRATORY (INHALATION) at 06:43

## 2018-10-08 RX ADMIN — BUMETANIDE SCH MG: 1 TABLET ORAL at 18:04

## 2018-10-08 RX ADMIN — APIXABAN SCH MG: 5 TABLET, FILM COATED ORAL at 21:00

## 2018-10-08 RX ADMIN — SODIUM CHLORIDE SCH ML: 9 INJECTION INTRAMUSCULAR; INTRAVENOUS; SUBCUTANEOUS at 09:33

## 2018-10-08 RX ADMIN — FUROSEMIDE SCH MG: 10 INJECTION, SOLUTION INTRAMUSCULAR; INTRAVENOUS at 05:53

## 2018-10-08 RX ADMIN — PANTOPRAZOLE SODIUM SCH MG: 40 TABLET, DELAYED RELEASE ORAL at 09:34

## 2018-10-08 RX ADMIN — IPRATROPIUM BROMIDE SCH MG: 0.5 SOLUTION RESPIRATORY (INHALATION) at 18:35

## 2018-10-08 RX ADMIN — ALBUTEROL SULFATE SCH MG: 2.5 SOLUTION RESPIRATORY (INHALATION) at 18:35

## 2018-10-08 RX ADMIN — RANOLAZINE SCH MG: 500 TABLET, FILM COATED, EXTENDED RELEASE ORAL at 09:32

## 2018-10-08 RX ADMIN — HYDROCODONE BITARTRATE AND ACETAMINOPHEN PRN TAB: 5; 325 TABLET ORAL at 05:53

## 2018-10-08 RX ADMIN — IPRATROPIUM BROMIDE SCH MG: 0.5 SOLUTION RESPIRATORY (INHALATION) at 11:21

## 2018-10-09 VITALS — DIASTOLIC BLOOD PRESSURE: 63 MMHG | SYSTOLIC BLOOD PRESSURE: 104 MMHG

## 2018-10-09 VITALS — SYSTOLIC BLOOD PRESSURE: 109 MMHG | DIASTOLIC BLOOD PRESSURE: 67 MMHG

## 2018-10-09 VITALS — DIASTOLIC BLOOD PRESSURE: 50 MMHG | SYSTOLIC BLOOD PRESSURE: 93 MMHG

## 2018-10-09 VITALS — DIASTOLIC BLOOD PRESSURE: 55 MMHG | SYSTOLIC BLOOD PRESSURE: 100 MMHG

## 2018-10-09 VITALS — SYSTOLIC BLOOD PRESSURE: 93 MMHG | DIASTOLIC BLOOD PRESSURE: 50 MMHG

## 2018-10-09 LAB
ANION GAP SERPL CALCULATED.3IONS-SCNC: 8 MMOL/L (ref 5–15)
BUN SERPL-MCNC: 26 MG/DL (ref 7–18)
BUN/CREAT SERPL: 23.2
CALCIUM SERPL-MCNC: 8.2 MG/DL (ref 8.5–10.1)
CHLORIDE SERPL-SCNC: 96 MMOL/L (ref 98–107)
CO2 SERPL-SCNC: 32 MMOL/L (ref 21–32)
GLUCOSE SERPL-MCNC: 84 MG/DL (ref 74–106)
MAGNESIUM SERPL-MCNC: 1.5 MG/DL (ref 1.6–2.6)
POTASSIUM SERPL-SCNC: 3.6 MMOL/L (ref 3.5–5.1)
SODIUM SERPL-SCNC: 136 MMOL/L (ref 136–145)

## 2018-10-09 RX ADMIN — ALBUTEROL SULFATE SCH MG: 2.5 SOLUTION RESPIRATORY (INHALATION) at 00:31

## 2018-10-09 RX ADMIN — SPIRONOLACTONE SCH MG: 25 TABLET, FILM COATED ORAL at 18:50

## 2018-10-09 RX ADMIN — BUDESONIDE SCH MG: 0.5 SUSPENSION RESPIRATORY (INHALATION) at 06:59

## 2018-10-09 RX ADMIN — POTASSIUM CHLORIDE SCH MEQ: 1500 TABLET, EXTENDED RELEASE ORAL at 22:02

## 2018-10-09 RX ADMIN — MAGNESIUM SULFATE IN DEXTROSE SCH MLS/HR: 10 INJECTION, SOLUTION INTRAVENOUS at 13:34

## 2018-10-09 RX ADMIN — ALBUTEROL SULFATE SCH MG: 2.5 SOLUTION RESPIRATORY (INHALATION) at 19:57

## 2018-10-09 RX ADMIN — BUDESONIDE SCH MG: 0.5 SUSPENSION RESPIRATORY (INHALATION) at 19:57

## 2018-10-09 RX ADMIN — RANOLAZINE SCH MG: 500 TABLET, FILM COATED, EXTENDED RELEASE ORAL at 11:35

## 2018-10-09 RX ADMIN — IPRATROPIUM BROMIDE SCH MG: 0.5 SOLUTION RESPIRATORY (INHALATION) at 06:59

## 2018-10-09 RX ADMIN — SODIUM CHLORIDE SCH ML: 9 INJECTION INTRAMUSCULAR; INTRAVENOUS; SUBCUTANEOUS at 22:03

## 2018-10-09 RX ADMIN — POTASSIUM CHLORIDE SCH MEQ: 1500 TABLET, EXTENDED RELEASE ORAL at 11:34

## 2018-10-09 RX ADMIN — SODIUM CHLORIDE SCH ML: 9 INJECTION INTRAMUSCULAR; INTRAVENOUS; SUBCUTANEOUS at 11:36

## 2018-10-09 RX ADMIN — DRONEDARONE SCH MG: 400 TABLET, FILM COATED ORAL at 22:02

## 2018-10-09 RX ADMIN — BUMETANIDE SCH MG: 1 TABLET ORAL at 05:09

## 2018-10-09 RX ADMIN — BUMETANIDE SCH MG: 1 TABLET ORAL at 18:51

## 2018-10-09 RX ADMIN — APIXABAN SCH MG: 5 TABLET, FILM COATED ORAL at 11:35

## 2018-10-09 RX ADMIN — MAGNESIUM SULFATE IN DEXTROSE SCH MLS/HR: 10 INJECTION, SOLUTION INTRAVENOUS at 15:00

## 2018-10-09 RX ADMIN — SPIRONOLACTONE SCH MG: 25 TABLET, FILM COATED ORAL at 05:09

## 2018-10-09 RX ADMIN — APIXABAN SCH MG: 5 TABLET, FILM COATED ORAL at 22:02

## 2018-10-09 RX ADMIN — IPRATROPIUM BROMIDE SCH MG: 0.5 SOLUTION RESPIRATORY (INHALATION) at 19:58

## 2018-10-09 RX ADMIN — IPRATROPIUM BROMIDE SCH MG: 0.5 SOLUTION RESPIRATORY (INHALATION) at 00:30

## 2018-10-09 RX ADMIN — PANTOPRAZOLE SODIUM SCH MG: 40 TABLET, DELAYED RELEASE ORAL at 11:35

## 2018-10-09 RX ADMIN — ALBUTEROL SULFATE SCH MG: 2.5 SOLUTION RESPIRATORY (INHALATION) at 11:34

## 2018-10-09 RX ADMIN — IPRATROPIUM BROMIDE SCH MG: 0.5 SOLUTION RESPIRATORY (INHALATION) at 11:34

## 2018-10-09 RX ADMIN — LISINOPRIL SCH MG: 10 TABLET ORAL at 11:35

## 2018-10-09 RX ADMIN — ALBUTEROL SULFATE SCH MG: 2.5 SOLUTION RESPIRATORY (INHALATION) at 06:59

## 2018-10-09 RX ADMIN — DRONEDARONE SCH MG: 400 TABLET, FILM COATED ORAL at 11:35

## 2018-10-09 RX ADMIN — RANOLAZINE SCH MG: 500 TABLET, FILM COATED, EXTENDED RELEASE ORAL at 22:02

## 2018-10-10 VITALS — DIASTOLIC BLOOD PRESSURE: 79 MMHG | SYSTOLIC BLOOD PRESSURE: 137 MMHG

## 2018-10-10 VITALS — SYSTOLIC BLOOD PRESSURE: 112 MMHG | DIASTOLIC BLOOD PRESSURE: 64 MMHG

## 2018-10-10 VITALS — SYSTOLIC BLOOD PRESSURE: 99 MMHG | DIASTOLIC BLOOD PRESSURE: 69 MMHG

## 2018-10-10 VITALS — DIASTOLIC BLOOD PRESSURE: 71 MMHG | SYSTOLIC BLOOD PRESSURE: 102 MMHG

## 2018-10-10 RX ADMIN — SPIRONOLACTONE SCH MG: 25 TABLET, FILM COATED ORAL at 06:11

## 2018-10-10 RX ADMIN — DRONEDARONE SCH MG: 400 TABLET, FILM COATED ORAL at 12:26

## 2018-10-10 RX ADMIN — ALBUTEROL SULFATE SCH MG: 2.5 SOLUTION RESPIRATORY (INHALATION) at 00:00

## 2018-10-10 RX ADMIN — RANOLAZINE SCH MG: 500 TABLET, FILM COATED, EXTENDED RELEASE ORAL at 12:26

## 2018-10-10 RX ADMIN — BUMETANIDE SCH MG: 1 TABLET ORAL at 06:12

## 2018-10-10 RX ADMIN — SODIUM CHLORIDE SCH ML: 9 INJECTION INTRAMUSCULAR; INTRAVENOUS; SUBCUTANEOUS at 12:29

## 2018-10-10 RX ADMIN — IPRATROPIUM BROMIDE SCH MG: 0.5 SOLUTION RESPIRATORY (INHALATION) at 06:00

## 2018-10-10 RX ADMIN — IPRATROPIUM BROMIDE SCH MG: 0.5 SOLUTION RESPIRATORY (INHALATION) at 00:00

## 2018-10-10 RX ADMIN — ALBUTEROL SULFATE SCH MG: 2.5 SOLUTION RESPIRATORY (INHALATION) at 06:00

## 2018-10-10 RX ADMIN — BUDESONIDE SCH MG: 0.5 SUSPENSION RESPIRATORY (INHALATION) at 06:00

## 2018-10-10 RX ADMIN — PANTOPRAZOLE SODIUM SCH MG: 40 TABLET, DELAYED RELEASE ORAL at 12:26

## 2018-10-10 RX ADMIN — POTASSIUM CHLORIDE SCH MEQ: 1500 TABLET, EXTENDED RELEASE ORAL at 12:26

## 2018-10-10 RX ADMIN — LISINOPRIL SCH MG: 10 TABLET ORAL at 10:00

## 2018-10-10 RX ADMIN — APIXABAN SCH MG: 5 TABLET, FILM COATED ORAL at 12:26

## 2019-10-04 ENCOUNTER — HOSPITAL ENCOUNTER (EMERGENCY)
Dept: HOSPITAL 15 - ER | Age: 59
Discharge: HOME | End: 2019-10-04
Payer: MEDICAID

## 2019-10-04 VITALS — BODY MASS INDEX: 31.01 KG/M2 | WEIGHT: 175 LBS | HEIGHT: 63 IN

## 2019-10-04 VITALS — SYSTOLIC BLOOD PRESSURE: 139 MMHG | DIASTOLIC BLOOD PRESSURE: 78 MMHG

## 2019-10-04 DIAGNOSIS — Z79.899: ICD-10-CM

## 2019-10-04 DIAGNOSIS — F17.210: ICD-10-CM

## 2019-10-04 DIAGNOSIS — I11.0: Primary | ICD-10-CM

## 2019-10-04 DIAGNOSIS — Z76.0: ICD-10-CM

## 2019-10-04 DIAGNOSIS — Z88.0: ICD-10-CM

## 2019-10-04 DIAGNOSIS — I50.9: ICD-10-CM

## 2019-10-04 DIAGNOSIS — J44.9: ICD-10-CM

## 2022-11-06 ENCOUNTER — HOSPITAL ENCOUNTER (INPATIENT)
Dept: HOSPITAL 15 - ER | Age: 62
LOS: 5 days | Discharge: LEFT BEFORE DISCHARGE | DRG: 137 | End: 2022-11-11
Attending: INTERNAL MEDICINE
Payer: MEDICAID

## 2022-11-06 VITALS — DIASTOLIC BLOOD PRESSURE: 38 MMHG | SYSTOLIC BLOOD PRESSURE: 94 MMHG

## 2022-11-06 VITALS — SYSTOLIC BLOOD PRESSURE: 94 MMHG | DIASTOLIC BLOOD PRESSURE: 55 MMHG

## 2022-11-06 VITALS — SYSTOLIC BLOOD PRESSURE: 112 MMHG | DIASTOLIC BLOOD PRESSURE: 50 MMHG

## 2022-11-06 VITALS — SYSTOLIC BLOOD PRESSURE: 93 MMHG | DIASTOLIC BLOOD PRESSURE: 48 MMHG

## 2022-11-06 VITALS — SYSTOLIC BLOOD PRESSURE: 99 MMHG | DIASTOLIC BLOOD PRESSURE: 54 MMHG

## 2022-11-06 VITALS — WEIGHT: 159.39 LBS | BODY MASS INDEX: 25.62 KG/M2 | HEIGHT: 66 IN

## 2022-11-06 VITALS — SYSTOLIC BLOOD PRESSURE: 102 MMHG | DIASTOLIC BLOOD PRESSURE: 51 MMHG

## 2022-11-06 VITALS — SYSTOLIC BLOOD PRESSURE: 110 MMHG | DIASTOLIC BLOOD PRESSURE: 61 MMHG

## 2022-11-06 VITALS — DIASTOLIC BLOOD PRESSURE: 54 MMHG | SYSTOLIC BLOOD PRESSURE: 96 MMHG

## 2022-11-06 VITALS — DIASTOLIC BLOOD PRESSURE: 36 MMHG | SYSTOLIC BLOOD PRESSURE: 99 MMHG

## 2022-11-06 VITALS — SYSTOLIC BLOOD PRESSURE: 103 MMHG | DIASTOLIC BLOOD PRESSURE: 76 MMHG

## 2022-11-06 VITALS — DIASTOLIC BLOOD PRESSURE: 78 MMHG | SYSTOLIC BLOOD PRESSURE: 135 MMHG

## 2022-11-06 VITALS — DIASTOLIC BLOOD PRESSURE: 49 MMHG | SYSTOLIC BLOOD PRESSURE: 110 MMHG

## 2022-11-06 DIAGNOSIS — Z28.310: ICD-10-CM

## 2022-11-06 DIAGNOSIS — Z83.3: ICD-10-CM

## 2022-11-06 DIAGNOSIS — I11.0: ICD-10-CM

## 2022-11-06 DIAGNOSIS — Z88.0: ICD-10-CM

## 2022-11-06 DIAGNOSIS — I50.43: ICD-10-CM

## 2022-11-06 DIAGNOSIS — Z91.199: ICD-10-CM

## 2022-11-06 DIAGNOSIS — E78.5: ICD-10-CM

## 2022-11-06 DIAGNOSIS — I21.A1: ICD-10-CM

## 2022-11-06 DIAGNOSIS — G89.29: ICD-10-CM

## 2022-11-06 DIAGNOSIS — E11.21: ICD-10-CM

## 2022-11-06 DIAGNOSIS — J45.901: ICD-10-CM

## 2022-11-06 DIAGNOSIS — Z28.21: ICD-10-CM

## 2022-11-06 DIAGNOSIS — J12.82: ICD-10-CM

## 2022-11-06 DIAGNOSIS — Z90.49: ICD-10-CM

## 2022-11-06 DIAGNOSIS — Z79.82: ICD-10-CM

## 2022-11-06 DIAGNOSIS — U07.1: Primary | ICD-10-CM

## 2022-11-06 DIAGNOSIS — J44.1: ICD-10-CM

## 2022-11-06 DIAGNOSIS — I27.20: ICD-10-CM

## 2022-11-06 DIAGNOSIS — I48.0: ICD-10-CM

## 2022-11-06 DIAGNOSIS — J96.21: ICD-10-CM

## 2022-11-06 DIAGNOSIS — R00.0: ICD-10-CM

## 2022-11-06 DIAGNOSIS — Z53.29: ICD-10-CM

## 2022-11-06 DIAGNOSIS — R79.89: ICD-10-CM

## 2022-11-06 DIAGNOSIS — Z95.810: ICD-10-CM

## 2022-11-06 DIAGNOSIS — D68.69: ICD-10-CM

## 2022-11-06 DIAGNOSIS — J44.0: ICD-10-CM

## 2022-11-06 LAB
ALBUMIN SERPL-MCNC: 3.4 G/DL (ref 3.4–5)
ALBUMIN SERPL-MCNC: 3.5 G/DL (ref 3.4–5)
ALP SERPL-CCNC: 104 U/L (ref 45–117)
ALP SERPL-CCNC: 106 U/L (ref 45–117)
ALT SERPL-CCNC: 113 U/L (ref 13–56)
ALT SERPL-CCNC: 99 U/L (ref 13–56)
ANION GAP SERPL CALCULATED.3IONS-SCNC: 12 MMOL/L (ref 5–15)
ANION GAP SERPL CALCULATED.3IONS-SCNC: 7 MMOL/L (ref 5–15)
APTT PPP: 33.4 SEC (ref 24.6–33.4)
BILIRUB SERPL-MCNC: 0.5 MG/DL (ref 0.2–1)
BILIRUB SERPL-MCNC: 0.6 MG/DL (ref 0.2–1)
BUN SERPL-MCNC: 15 MG/DL (ref 7–18)
BUN SERPL-MCNC: 15 MG/DL (ref 7–18)
BUN/CREAT SERPL: 13.4
BUN/CREAT SERPL: 18.3
CALCIUM SERPL-MCNC: 8 MG/DL (ref 8.5–10.1)
CALCIUM SERPL-MCNC: 8.4 MG/DL (ref 8.5–10.1)
CHLORIDE SERPL-SCNC: 109 MMOL/L (ref 98–107)
CHLORIDE SERPL-SCNC: 111 MMOL/L (ref 98–107)
CHOLEST SERPL-MCNC: 62 MG/DL (ref ?–200)
CO2 SERPL-SCNC: 19 MMOL/L (ref 21–32)
CO2 SERPL-SCNC: 23 MMOL/L (ref 21–32)
GLUCOSE SERPL-MCNC: 138 MG/DL (ref 74–106)
GLUCOSE SERPL-MCNC: 272 MG/DL (ref 74–106)
HCT VFR BLD AUTO: 43.8 % (ref 36–46)
HCT VFR BLD AUTO: 47.6 % (ref 36–46)
HDLC SERPL-MCNC: 46 MG/DL (ref 40–59)
HGB BLD-MCNC: 14.6 G/DL (ref 12.2–16.2)
HGB BLD-MCNC: 14.9 G/DL (ref 12.2–16.2)
INR PPP: 0.97 (ref 0.9–1.15)
MAGNESIUM SERPL-MCNC: 2.5 MG/DL (ref 1.6–2.6)
MCH RBC QN AUTO: 27.3 PG (ref 28–32)
MCH RBC QN AUTO: 27.4 PG (ref 28–32)
MCV RBC AUTO: 82.4 FL (ref 80–100)
MCV RBC AUTO: 87.2 FL (ref 80–100)
NRBC BLD QL AUTO: 0 %
NRBC BLD QL AUTO: 0.3 %
POTASSIUM SERPL-SCNC: 4.1 MMOL/L (ref 3.5–5.1)
POTASSIUM SERPL-SCNC: 5.3 MMOL/L (ref 3.5–5.1)
PROT SERPL-MCNC: 7.1 G/DL (ref 6.4–8.2)
PROT SERPL-MCNC: 7.6 G/DL (ref 6.4–8.2)
SODIUM SERPL-SCNC: 140 MMOL/L (ref 136–145)
SODIUM SERPL-SCNC: 141 MMOL/L (ref 136–145)
TRIGL SERPL-MCNC: 73 MG/DL (ref ?–150)
TSH SERPL DL<=0.05 MIU/L-ACNC: 0.36 UIU/ML (ref 0.36–3.74)

## 2022-11-06 PROCEDURE — 80048 BASIC METABOLIC PNL TOTAL CA: CPT

## 2022-11-06 PROCEDURE — 84443 ASSAY THYROID STIM HORMONE: CPT

## 2022-11-06 PROCEDURE — 82306 VITAMIN D 25 HYDROXY: CPT

## 2022-11-06 PROCEDURE — 80074 ACUTE HEPATITIS PANEL: CPT

## 2022-11-06 PROCEDURE — 84484 ASSAY OF TROPONIN QUANT: CPT

## 2022-11-06 PROCEDURE — 86141 C-REACTIVE PROTEIN HS: CPT

## 2022-11-06 PROCEDURE — 87426 SARSCOV CORONAVIRUS AG IA: CPT

## 2022-11-06 PROCEDURE — 83036 HEMOGLOBIN GLYCOSYLATED A1C: CPT

## 2022-11-06 PROCEDURE — 96375 TX/PRO/DX INJ NEW DRUG ADDON: CPT

## 2022-11-06 PROCEDURE — 71045 X-RAY EXAM CHEST 1 VIEW: CPT

## 2022-11-06 PROCEDURE — 36600 WITHDRAWAL OF ARTERIAL BLOOD: CPT

## 2022-11-06 PROCEDURE — 85379 FIBRIN DEGRADATION QUANT: CPT

## 2022-11-06 PROCEDURE — 85025 COMPLETE CBC W/AUTO DIFF WBC: CPT

## 2022-11-06 PROCEDURE — 83615 LACTATE (LD) (LDH) ENZYME: CPT

## 2022-11-06 PROCEDURE — 93306 TTE W/DOPPLER COMPLETE: CPT

## 2022-11-06 PROCEDURE — 81001 URINALYSIS AUTO W/SCOPE: CPT

## 2022-11-06 PROCEDURE — 36415 COLL VENOUS BLD VENIPUNCTURE: CPT

## 2022-11-06 PROCEDURE — 85730 THROMBOPLASTIN TIME PARTIAL: CPT

## 2022-11-06 PROCEDURE — 83605 ASSAY OF LACTIC ACID: CPT

## 2022-11-06 PROCEDURE — 85610 PROTHROMBIN TIME: CPT

## 2022-11-06 PROCEDURE — 83735 ASSAY OF MAGNESIUM: CPT

## 2022-11-06 PROCEDURE — 93005 ELECTROCARDIOGRAM TRACING: CPT

## 2022-11-06 PROCEDURE — 5A0945A ASSISTANCE WITH RESPIRATORY VENTILATION, 24-96 CONSECUTIVE HOURS, HIGH NASAL FLOW/VELOCITY: ICD-10-PCS | Performed by: INTERNAL MEDICINE

## 2022-11-06 PROCEDURE — 94640 AIRWAY INHALATION TREATMENT: CPT

## 2022-11-06 PROCEDURE — 97163 PT EVAL HIGH COMPLEX 45 MIN: CPT

## 2022-11-06 PROCEDURE — 99291 CRITICAL CARE FIRST HOUR: CPT

## 2022-11-06 PROCEDURE — 82805 BLOOD GASES W/O2 SATURATION: CPT

## 2022-11-06 PROCEDURE — 96365 THER/PROPH/DIAG IV INF INIT: CPT

## 2022-11-06 PROCEDURE — 71275 CT ANGIOGRAPHY CHEST: CPT

## 2022-11-06 PROCEDURE — 80053 COMPREHEN METABOLIC PANEL: CPT

## 2022-11-06 PROCEDURE — 76700 US EXAM ABDOM COMPLETE: CPT

## 2022-11-06 PROCEDURE — 82728 ASSAY OF FERRITIN: CPT

## 2022-11-06 PROCEDURE — 80061 LIPID PANEL: CPT

## 2022-11-06 PROCEDURE — 83880 ASSAY OF NATRIURETIC PEPTIDE: CPT

## 2022-11-06 RX ADMIN — BUDESONIDE SCH MCG: 180 AEROSOL, POWDER RESPIRATORY (INHALATION) at 22:12

## 2022-11-06 RX ADMIN — ATORVASTATIN CALCIUM SCH MG: 20 TABLET, FILM COATED ORAL at 23:00

## 2022-11-06 RX ADMIN — CARVEDILOL SCH MG: 3.12 TABLET, FILM COATED ORAL at 22:00

## 2022-11-06 RX ADMIN — RANOLAZINE SCH MG: 500 TABLET, FILM COATED, EXTENDED RELEASE ORAL at 22:00

## 2022-11-06 RX ADMIN — ENOXAPARIN SODIUM SCH MG: 80 INJECTION SUBCUTANEOUS at 23:03

## 2022-11-06 RX ADMIN — ALBUTEROL SULFATE PRN MCG: 108 AEROSOL, METERED RESPIRATORY (INHALATION) at 22:14

## 2022-11-07 VITALS — DIASTOLIC BLOOD PRESSURE: 70 MMHG | SYSTOLIC BLOOD PRESSURE: 88 MMHG

## 2022-11-07 VITALS — DIASTOLIC BLOOD PRESSURE: 68 MMHG | SYSTOLIC BLOOD PRESSURE: 106 MMHG

## 2022-11-07 VITALS — DIASTOLIC BLOOD PRESSURE: 78 MMHG | SYSTOLIC BLOOD PRESSURE: 127 MMHG

## 2022-11-07 VITALS — SYSTOLIC BLOOD PRESSURE: 121 MMHG | DIASTOLIC BLOOD PRESSURE: 69 MMHG

## 2022-11-07 VITALS — SYSTOLIC BLOOD PRESSURE: 113 MMHG | DIASTOLIC BLOOD PRESSURE: 67 MMHG

## 2022-11-07 VITALS — DIASTOLIC BLOOD PRESSURE: 81 MMHG | SYSTOLIC BLOOD PRESSURE: 117 MMHG

## 2022-11-07 VITALS — DIASTOLIC BLOOD PRESSURE: 73 MMHG | SYSTOLIC BLOOD PRESSURE: 119 MMHG

## 2022-11-07 VITALS — SYSTOLIC BLOOD PRESSURE: 107 MMHG | DIASTOLIC BLOOD PRESSURE: 67 MMHG

## 2022-11-07 VITALS — SYSTOLIC BLOOD PRESSURE: 119 MMHG | DIASTOLIC BLOOD PRESSURE: 72 MMHG

## 2022-11-07 VITALS — DIASTOLIC BLOOD PRESSURE: 35 MMHG | SYSTOLIC BLOOD PRESSURE: 81 MMHG

## 2022-11-07 VITALS — SYSTOLIC BLOOD PRESSURE: 126 MMHG | DIASTOLIC BLOOD PRESSURE: 79 MMHG

## 2022-11-07 VITALS — DIASTOLIC BLOOD PRESSURE: 31 MMHG | SYSTOLIC BLOOD PRESSURE: 83 MMHG

## 2022-11-07 VITALS — DIASTOLIC BLOOD PRESSURE: 70 MMHG | SYSTOLIC BLOOD PRESSURE: 105 MMHG

## 2022-11-07 VITALS — SYSTOLIC BLOOD PRESSURE: 115 MMHG | DIASTOLIC BLOOD PRESSURE: 62 MMHG

## 2022-11-07 VITALS — SYSTOLIC BLOOD PRESSURE: 99 MMHG | DIASTOLIC BLOOD PRESSURE: 71 MMHG

## 2022-11-07 VITALS — SYSTOLIC BLOOD PRESSURE: 113 MMHG | DIASTOLIC BLOOD PRESSURE: 71 MMHG

## 2022-11-07 VITALS — DIASTOLIC BLOOD PRESSURE: 65 MMHG | SYSTOLIC BLOOD PRESSURE: 104 MMHG

## 2022-11-07 VITALS — DIASTOLIC BLOOD PRESSURE: 63 MMHG | SYSTOLIC BLOOD PRESSURE: 101 MMHG

## 2022-11-07 VITALS — SYSTOLIC BLOOD PRESSURE: 130 MMHG | DIASTOLIC BLOOD PRESSURE: 74 MMHG

## 2022-11-07 VITALS — SYSTOLIC BLOOD PRESSURE: 104 MMHG | DIASTOLIC BLOOD PRESSURE: 64 MMHG

## 2022-11-07 VITALS — DIASTOLIC BLOOD PRESSURE: 34 MMHG | SYSTOLIC BLOOD PRESSURE: 83 MMHG

## 2022-11-07 VITALS — SYSTOLIC BLOOD PRESSURE: 114 MMHG | DIASTOLIC BLOOD PRESSURE: 67 MMHG

## 2022-11-07 VITALS — SYSTOLIC BLOOD PRESSURE: 91 MMHG | DIASTOLIC BLOOD PRESSURE: 55 MMHG

## 2022-11-07 VITALS — DIASTOLIC BLOOD PRESSURE: 64 MMHG | SYSTOLIC BLOOD PRESSURE: 112 MMHG

## 2022-11-07 VITALS — SYSTOLIC BLOOD PRESSURE: 98 MMHG | DIASTOLIC BLOOD PRESSURE: 62 MMHG

## 2022-11-07 VITALS — SYSTOLIC BLOOD PRESSURE: 119 MMHG | DIASTOLIC BLOOD PRESSURE: 70 MMHG

## 2022-11-07 VITALS — DIASTOLIC BLOOD PRESSURE: 70 MMHG | SYSTOLIC BLOOD PRESSURE: 114 MMHG

## 2022-11-07 VITALS — SYSTOLIC BLOOD PRESSURE: 128 MMHG | DIASTOLIC BLOOD PRESSURE: 84 MMHG

## 2022-11-07 VITALS — DIASTOLIC BLOOD PRESSURE: 68 MMHG | SYSTOLIC BLOOD PRESSURE: 117 MMHG

## 2022-11-07 VITALS — DIASTOLIC BLOOD PRESSURE: 64 MMHG | SYSTOLIC BLOOD PRESSURE: 114 MMHG

## 2022-11-07 VITALS — DIASTOLIC BLOOD PRESSURE: 67 MMHG | SYSTOLIC BLOOD PRESSURE: 115 MMHG

## 2022-11-07 VITALS — SYSTOLIC BLOOD PRESSURE: 127 MMHG | DIASTOLIC BLOOD PRESSURE: 75 MMHG

## 2022-11-07 VITALS — SYSTOLIC BLOOD PRESSURE: 106 MMHG | DIASTOLIC BLOOD PRESSURE: 65 MMHG

## 2022-11-07 VITALS — SYSTOLIC BLOOD PRESSURE: 122 MMHG | DIASTOLIC BLOOD PRESSURE: 74 MMHG

## 2022-11-07 VITALS — SYSTOLIC BLOOD PRESSURE: 115 MMHG | DIASTOLIC BLOOD PRESSURE: 73 MMHG

## 2022-11-07 VITALS — DIASTOLIC BLOOD PRESSURE: 63 MMHG | SYSTOLIC BLOOD PRESSURE: 104 MMHG

## 2022-11-07 VITALS — SYSTOLIC BLOOD PRESSURE: 105 MMHG | DIASTOLIC BLOOD PRESSURE: 65 MMHG

## 2022-11-07 LAB
ALBUMIN SERPL-MCNC: 3 G/DL (ref 3.4–5)
ALP SERPL-CCNC: 79 U/L (ref 45–117)
ALT SERPL-CCNC: 91 U/L (ref 13–56)
ANION GAP SERPL CALCULATED.3IONS-SCNC: 8 MMOL/L (ref 5–15)
BILIRUB SERPL-MCNC: 0.9 MG/DL (ref 0.2–1)
BUN SERPL-MCNC: 13 MG/DL (ref 7–18)
BUN/CREAT SERPL: 19.7
CALCIUM SERPL-MCNC: 8.4 MG/DL (ref 8.5–10.1)
CHLORIDE SERPL-SCNC: 109 MMOL/L (ref 98–107)
CO2 SERPL-SCNC: 27 MMOL/L (ref 21–32)
GLUCOSE SERPL-MCNC: 122 MG/DL (ref 74–106)
HCT VFR BLD AUTO: 42 % (ref 36–46)
HGB BLD-MCNC: 14 G/DL (ref 12.2–16.2)
MCH RBC QN AUTO: 27.7 PG (ref 28–32)
MCV RBC AUTO: 83.2 FL (ref 80–100)
NRBC BLD QL AUTO: 0.2 %
POTASSIUM SERPL-SCNC: 4 MMOL/L (ref 3.5–5.1)
PROT SERPL-MCNC: 7 G/DL (ref 6.4–8.2)
SODIUM SERPL-SCNC: 144 MMOL/L (ref 136–145)

## 2022-11-07 RX ADMIN — LOSARTAN POTASSIUM SCH MG: 25 TABLET, FILM COATED ORAL at 10:42

## 2022-11-07 RX ADMIN — FUROSEMIDE SCH MG: 10 INJECTION, SOLUTION INTRAMUSCULAR; INTRAVENOUS at 18:38

## 2022-11-07 RX ADMIN — ALBUTEROL SULFATE PRN MCG: 108 AEROSOL, METERED RESPIRATORY (INHALATION) at 06:39

## 2022-11-07 RX ADMIN — ENOXAPARIN SODIUM SCH MG: 80 INJECTION SUBCUTANEOUS at 10:43

## 2022-11-07 RX ADMIN — HYDROCODONE BITARTRATE AND ACETAMINOPHEN PRN TAB: 10; 325 TABLET ORAL at 11:53

## 2022-11-07 RX ADMIN — PANTOPRAZOLE SODIUM SCH MG: 40 INJECTION, POWDER, FOR SOLUTION INTRAVENOUS at 10:40

## 2022-11-07 RX ADMIN — BUDESONIDE SCH MCG: 180 AEROSOL, POWDER RESPIRATORY (INHALATION) at 06:39

## 2022-11-07 RX ADMIN — Medication SCH MG: at 10:43

## 2022-11-07 RX ADMIN — BUDESONIDE SCH MCG: 180 AEROSOL, POWDER RESPIRATORY (INHALATION) at 21:46

## 2022-11-07 RX ADMIN — HYDROCODONE BITARTRATE AND ACETAMINOPHEN PRN TAB: 10; 325 TABLET ORAL at 22:04

## 2022-11-07 RX ADMIN — Medication SCH UNIT: at 10:43

## 2022-11-07 RX ADMIN — CARVEDILOL SCH MG: 3.12 TABLET, FILM COATED ORAL at 21:54

## 2022-11-07 RX ADMIN — RANOLAZINE SCH MG: 500 TABLET, FILM COATED, EXTENDED RELEASE ORAL at 22:05

## 2022-11-07 RX ADMIN — ATORVASTATIN CALCIUM SCH MG: 20 TABLET, FILM COATED ORAL at 22:04

## 2022-11-07 RX ADMIN — CARVEDILOL SCH MG: 3.12 TABLET, FILM COATED ORAL at 10:42

## 2022-11-07 RX ADMIN — RANOLAZINE SCH MG: 500 TABLET, FILM COATED, EXTENDED RELEASE ORAL at 10:42

## 2022-11-07 RX ADMIN — AZITHROMYCIN DIHYDRATE SCH MLS/HR: 500 INJECTION, POWDER, LYOPHILIZED, FOR SOLUTION INTRAVENOUS at 10:41

## 2022-11-07 RX ADMIN — ASPIRIN SCH MG: 81 TABLET ORAL at 10:42

## 2022-11-07 RX ADMIN — APIXABAN SCH MG: 5 TABLET, FILM COATED ORAL at 22:03

## 2022-11-07 RX ADMIN — FUROSEMIDE SCH MG: 10 INJECTION, SOLUTION INTRAMUSCULAR; INTRAVENOUS at 06:00

## 2022-11-08 VITALS — DIASTOLIC BLOOD PRESSURE: 68 MMHG | SYSTOLIC BLOOD PRESSURE: 122 MMHG

## 2022-11-08 VITALS — DIASTOLIC BLOOD PRESSURE: 64 MMHG | SYSTOLIC BLOOD PRESSURE: 111 MMHG

## 2022-11-08 VITALS — SYSTOLIC BLOOD PRESSURE: 107 MMHG | DIASTOLIC BLOOD PRESSURE: 64 MMHG

## 2022-11-08 VITALS — SYSTOLIC BLOOD PRESSURE: 109 MMHG | DIASTOLIC BLOOD PRESSURE: 64 MMHG

## 2022-11-08 VITALS — SYSTOLIC BLOOD PRESSURE: 103 MMHG | DIASTOLIC BLOOD PRESSURE: 67 MMHG

## 2022-11-08 VITALS — DIASTOLIC BLOOD PRESSURE: 74 MMHG | SYSTOLIC BLOOD PRESSURE: 110 MMHG

## 2022-11-08 VITALS — DIASTOLIC BLOOD PRESSURE: 58 MMHG | SYSTOLIC BLOOD PRESSURE: 101 MMHG

## 2022-11-08 VITALS — DIASTOLIC BLOOD PRESSURE: 66 MMHG | SYSTOLIC BLOOD PRESSURE: 111 MMHG

## 2022-11-08 VITALS — DIASTOLIC BLOOD PRESSURE: 71 MMHG | SYSTOLIC BLOOD PRESSURE: 110 MMHG

## 2022-11-08 VITALS — SYSTOLIC BLOOD PRESSURE: 109 MMHG | DIASTOLIC BLOOD PRESSURE: 61 MMHG

## 2022-11-08 VITALS — SYSTOLIC BLOOD PRESSURE: 102 MMHG | DIASTOLIC BLOOD PRESSURE: 57 MMHG

## 2022-11-08 VITALS — SYSTOLIC BLOOD PRESSURE: 104 MMHG | DIASTOLIC BLOOD PRESSURE: 61 MMHG

## 2022-11-08 VITALS — SYSTOLIC BLOOD PRESSURE: 112 MMHG | DIASTOLIC BLOOD PRESSURE: 74 MMHG

## 2022-11-08 VITALS — DIASTOLIC BLOOD PRESSURE: 67 MMHG | SYSTOLIC BLOOD PRESSURE: 119 MMHG

## 2022-11-08 VITALS — SYSTOLIC BLOOD PRESSURE: 110 MMHG | DIASTOLIC BLOOD PRESSURE: 69 MMHG

## 2022-11-08 VITALS — SYSTOLIC BLOOD PRESSURE: 95 MMHG | DIASTOLIC BLOOD PRESSURE: 49 MMHG

## 2022-11-08 VITALS — DIASTOLIC BLOOD PRESSURE: 66 MMHG | SYSTOLIC BLOOD PRESSURE: 99 MMHG

## 2022-11-08 VITALS — DIASTOLIC BLOOD PRESSURE: 67 MMHG | SYSTOLIC BLOOD PRESSURE: 109 MMHG

## 2022-11-08 VITALS — DIASTOLIC BLOOD PRESSURE: 60 MMHG | SYSTOLIC BLOOD PRESSURE: 94 MMHG

## 2022-11-08 VITALS — DIASTOLIC BLOOD PRESSURE: 60 MMHG | SYSTOLIC BLOOD PRESSURE: 108 MMHG

## 2022-11-08 VITALS — DIASTOLIC BLOOD PRESSURE: 63 MMHG | SYSTOLIC BLOOD PRESSURE: 103 MMHG

## 2022-11-08 VITALS — DIASTOLIC BLOOD PRESSURE: 61 MMHG | SYSTOLIC BLOOD PRESSURE: 114 MMHG

## 2022-11-08 VITALS — SYSTOLIC BLOOD PRESSURE: 115 MMHG | DIASTOLIC BLOOD PRESSURE: 73 MMHG

## 2022-11-08 VITALS — SYSTOLIC BLOOD PRESSURE: 109 MMHG | DIASTOLIC BLOOD PRESSURE: 56 MMHG

## 2022-11-08 VITALS — DIASTOLIC BLOOD PRESSURE: 56 MMHG | SYSTOLIC BLOOD PRESSURE: 106 MMHG

## 2022-11-08 VITALS — DIASTOLIC BLOOD PRESSURE: 65 MMHG | SYSTOLIC BLOOD PRESSURE: 114 MMHG

## 2022-11-08 VITALS — SYSTOLIC BLOOD PRESSURE: 118 MMHG | DIASTOLIC BLOOD PRESSURE: 58 MMHG

## 2022-11-08 VITALS — DIASTOLIC BLOOD PRESSURE: 58 MMHG | SYSTOLIC BLOOD PRESSURE: 108 MMHG

## 2022-11-08 VITALS — SYSTOLIC BLOOD PRESSURE: 97 MMHG | DIASTOLIC BLOOD PRESSURE: 59 MMHG

## 2022-11-08 VITALS — DIASTOLIC BLOOD PRESSURE: 72 MMHG | SYSTOLIC BLOOD PRESSURE: 113 MMHG

## 2022-11-08 VITALS — DIASTOLIC BLOOD PRESSURE: 60 MMHG | SYSTOLIC BLOOD PRESSURE: 99 MMHG

## 2022-11-08 VITALS — DIASTOLIC BLOOD PRESSURE: 60 MMHG | SYSTOLIC BLOOD PRESSURE: 109 MMHG

## 2022-11-08 VITALS — DIASTOLIC BLOOD PRESSURE: 58 MMHG | SYSTOLIC BLOOD PRESSURE: 98 MMHG

## 2022-11-08 VITALS — DIASTOLIC BLOOD PRESSURE: 65 MMHG | SYSTOLIC BLOOD PRESSURE: 105 MMHG

## 2022-11-08 VITALS — SYSTOLIC BLOOD PRESSURE: 110 MMHG | DIASTOLIC BLOOD PRESSURE: 62 MMHG

## 2022-11-08 VITALS — DIASTOLIC BLOOD PRESSURE: 64 MMHG | SYSTOLIC BLOOD PRESSURE: 106 MMHG

## 2022-11-08 LAB
ALBUMIN SERPL-MCNC: 3.3 G/DL (ref 3.4–5)
ALP SERPL-CCNC: 77 U/L (ref 45–117)
ALT SERPL-CCNC: 78 U/L (ref 13–56)
ANION GAP SERPL CALCULATED.3IONS-SCNC: 6 MMOL/L (ref 5–15)
BILIRUB SERPL-MCNC: 0.6 MG/DL (ref 0.2–1)
BUN SERPL-MCNC: 20 MG/DL (ref 7–18)
BUN/CREAT SERPL: 34.5
CALCIUM SERPL-MCNC: 8.6 MG/DL (ref 8.5–10.1)
CHLORIDE SERPL-SCNC: 105 MMOL/L (ref 98–107)
CO2 SERPL-SCNC: 32 MMOL/L (ref 21–32)
GLUCOSE SERPL-MCNC: 120 MG/DL (ref 74–106)
HCT VFR BLD AUTO: 41.5 % (ref 36–46)
HGB BLD-MCNC: 14.1 G/DL (ref 12.2–16.2)
MCH RBC QN AUTO: 28 PG (ref 28–32)
MCV RBC AUTO: 82.7 FL (ref 80–100)
NRBC BLD QL AUTO: 0.1 %
POTASSIUM SERPL-SCNC: 4 MMOL/L (ref 3.5–5.1)
PROT SERPL-MCNC: 6.6 G/DL (ref 6.4–8.2)
SODIUM SERPL-SCNC: 143 MMOL/L (ref 136–145)

## 2022-11-08 RX ADMIN — APIXABAN SCH MG: 5 TABLET, FILM COATED ORAL at 09:41

## 2022-11-08 RX ADMIN — PANTOPRAZOLE SODIUM SCH MG: 40 INJECTION, POWDER, FOR SOLUTION INTRAVENOUS at 09:39

## 2022-11-08 RX ADMIN — ATORVASTATIN CALCIUM SCH MG: 20 TABLET, FILM COATED ORAL at 21:53

## 2022-11-08 RX ADMIN — Medication SCH MG: at 09:41

## 2022-11-08 RX ADMIN — BUDESONIDE SCH MCG: 180 AEROSOL, POWDER RESPIRATORY (INHALATION) at 10:06

## 2022-11-08 RX ADMIN — CARVEDILOL SCH MG: 3.12 TABLET, FILM COATED ORAL at 22:00

## 2022-11-08 RX ADMIN — POTASSIUM CHLORIDE SCH MEQ: 750 TABLET, FILM COATED, EXTENDED RELEASE ORAL at 09:41

## 2022-11-08 RX ADMIN — HYDROCODONE BITARTRATE AND ACETAMINOPHEN PRN TAB: 10; 325 TABLET ORAL at 16:30

## 2022-11-08 RX ADMIN — Medication SCH UNIT: at 09:41

## 2022-11-08 RX ADMIN — FUROSEMIDE SCH MG: 10 INJECTION, SOLUTION INTRAMUSCULAR; INTRAVENOUS at 07:00

## 2022-11-08 RX ADMIN — LOSARTAN POTASSIUM SCH MG: 25 TABLET, FILM COATED ORAL at 09:40

## 2022-11-08 RX ADMIN — APIXABAN SCH MG: 5 TABLET, FILM COATED ORAL at 21:52

## 2022-11-08 RX ADMIN — RANOLAZINE SCH MG: 500 TABLET, FILM COATED, EXTENDED RELEASE ORAL at 09:41

## 2022-11-08 RX ADMIN — HYDROCODONE BITARTRATE AND ACETAMINOPHEN PRN TAB: 10; 325 TABLET ORAL at 07:25

## 2022-11-08 RX ADMIN — BUDESONIDE SCH MCG: 180 AEROSOL, POWDER RESPIRATORY (INHALATION) at 18:44

## 2022-11-08 RX ADMIN — ZOLPIDEM TARTRATE PRN MG: 5 TABLET ORAL at 21:53

## 2022-11-08 RX ADMIN — FUROSEMIDE SCH MG: 10 INJECTION, SOLUTION INTRAMUSCULAR; INTRAVENOUS at 19:43

## 2022-11-08 RX ADMIN — ASPIRIN SCH MG: 81 TABLET ORAL at 09:41

## 2022-11-08 RX ADMIN — CARVEDILOL SCH MG: 3.12 TABLET, FILM COATED ORAL at 09:40

## 2022-11-08 RX ADMIN — AZITHROMYCIN DIHYDRATE SCH MLS/HR: 500 INJECTION, POWDER, LYOPHILIZED, FOR SOLUTION INTRAVENOUS at 09:40

## 2022-11-08 RX ADMIN — ALBUTEROL SULFATE PRN MCG: 108 AEROSOL, METERED RESPIRATORY (INHALATION) at 10:54

## 2022-11-08 RX ADMIN — RANOLAZINE SCH MG: 500 TABLET, FILM COATED, EXTENDED RELEASE ORAL at 21:52

## 2022-11-08 RX ADMIN — ALBUTEROL SULFATE PRN MCG: 108 AEROSOL, METERED RESPIRATORY (INHALATION) at 18:44

## 2022-11-09 VITALS — DIASTOLIC BLOOD PRESSURE: 58 MMHG | SYSTOLIC BLOOD PRESSURE: 126 MMHG

## 2022-11-09 VITALS — DIASTOLIC BLOOD PRESSURE: 67 MMHG | SYSTOLIC BLOOD PRESSURE: 148 MMHG

## 2022-11-09 VITALS — DIASTOLIC BLOOD PRESSURE: 63 MMHG | SYSTOLIC BLOOD PRESSURE: 97 MMHG

## 2022-11-09 VITALS — SYSTOLIC BLOOD PRESSURE: 135 MMHG | DIASTOLIC BLOOD PRESSURE: 66 MMHG

## 2022-11-09 VITALS — SYSTOLIC BLOOD PRESSURE: 101 MMHG | DIASTOLIC BLOOD PRESSURE: 63 MMHG

## 2022-11-09 VITALS — SYSTOLIC BLOOD PRESSURE: 133 MMHG | DIASTOLIC BLOOD PRESSURE: 61 MMHG

## 2022-11-09 VITALS — SYSTOLIC BLOOD PRESSURE: 112 MMHG | DIASTOLIC BLOOD PRESSURE: 70 MMHG

## 2022-11-09 VITALS — DIASTOLIC BLOOD PRESSURE: 59 MMHG | SYSTOLIC BLOOD PRESSURE: 102 MMHG

## 2022-11-09 VITALS — SYSTOLIC BLOOD PRESSURE: 110 MMHG | DIASTOLIC BLOOD PRESSURE: 60 MMHG

## 2022-11-09 VITALS — SYSTOLIC BLOOD PRESSURE: 109 MMHG | DIASTOLIC BLOOD PRESSURE: 70 MMHG

## 2022-11-09 VITALS — SYSTOLIC BLOOD PRESSURE: 121 MMHG | DIASTOLIC BLOOD PRESSURE: 56 MMHG

## 2022-11-09 VITALS — DIASTOLIC BLOOD PRESSURE: 66 MMHG | SYSTOLIC BLOOD PRESSURE: 128 MMHG

## 2022-11-09 VITALS — SYSTOLIC BLOOD PRESSURE: 100 MMHG | DIASTOLIC BLOOD PRESSURE: 61 MMHG

## 2022-11-09 VITALS — SYSTOLIC BLOOD PRESSURE: 178 MMHG | DIASTOLIC BLOOD PRESSURE: 104 MMHG

## 2022-11-09 VITALS — SYSTOLIC BLOOD PRESSURE: 120 MMHG | DIASTOLIC BLOOD PRESSURE: 60 MMHG

## 2022-11-09 VITALS — SYSTOLIC BLOOD PRESSURE: 137 MMHG | DIASTOLIC BLOOD PRESSURE: 80 MMHG

## 2022-11-09 VITALS — DIASTOLIC BLOOD PRESSURE: 69 MMHG | SYSTOLIC BLOOD PRESSURE: 130 MMHG

## 2022-11-09 VITALS — SYSTOLIC BLOOD PRESSURE: 139 MMHG | DIASTOLIC BLOOD PRESSURE: 65 MMHG

## 2022-11-09 VITALS — DIASTOLIC BLOOD PRESSURE: 53 MMHG | SYSTOLIC BLOOD PRESSURE: 147 MMHG

## 2022-11-09 VITALS — SYSTOLIC BLOOD PRESSURE: 111 MMHG | DIASTOLIC BLOOD PRESSURE: 60 MMHG

## 2022-11-09 VITALS — DIASTOLIC BLOOD PRESSURE: 68 MMHG | SYSTOLIC BLOOD PRESSURE: 108 MMHG

## 2022-11-09 VITALS — SYSTOLIC BLOOD PRESSURE: 134 MMHG | DIASTOLIC BLOOD PRESSURE: 72 MMHG

## 2022-11-09 LAB
ANION GAP SERPL CALCULATED.3IONS-SCNC: 8 MMOL/L (ref 5–15)
BUN SERPL-MCNC: 27 MG/DL (ref 7–18)
BUN/CREAT SERPL: 45
CALCIUM SERPL-MCNC: 8.6 MG/DL (ref 8.5–10.1)
CHLORIDE SERPL-SCNC: 103 MMOL/L (ref 98–107)
CO2 SERPL-SCNC: 31 MMOL/L (ref 21–32)
GLUCOSE SERPL-MCNC: 108 MG/DL (ref 74–106)
POTASSIUM SERPL-SCNC: 4.2 MMOL/L (ref 3.5–5.1)
SODIUM SERPL-SCNC: 142 MMOL/L (ref 136–145)

## 2022-11-09 RX ADMIN — LOSARTAN POTASSIUM SCH MG: 25 TABLET, FILM COATED ORAL at 09:44

## 2022-11-09 RX ADMIN — RANOLAZINE SCH MG: 500 TABLET, FILM COATED, EXTENDED RELEASE ORAL at 22:25

## 2022-11-09 RX ADMIN — ATORVASTATIN CALCIUM SCH MG: 20 TABLET, FILM COATED ORAL at 22:00

## 2022-11-09 RX ADMIN — APIXABAN SCH MG: 5 TABLET, FILM COATED ORAL at 22:25

## 2022-11-09 RX ADMIN — HYDROCODONE BITARTRATE AND ACETAMINOPHEN PRN TAB: 10; 325 TABLET ORAL at 23:07

## 2022-11-09 RX ADMIN — BUDESONIDE SCH MCG: 180 AEROSOL, POWDER RESPIRATORY (INHALATION) at 22:00

## 2022-11-09 RX ADMIN — CARVEDILOL SCH MG: 3.12 TABLET, FILM COATED ORAL at 09:44

## 2022-11-09 RX ADMIN — AZITHROMYCIN DIHYDRATE SCH MLS/HR: 500 INJECTION, POWDER, LYOPHILIZED, FOR SOLUTION INTRAVENOUS at 09:42

## 2022-11-09 RX ADMIN — ALBUTEROL SULFATE PRN MCG: 108 AEROSOL, METERED RESPIRATORY (INHALATION) at 10:47

## 2022-11-09 RX ADMIN — ATORVASTATIN CALCIUM SCH MG: 20 TABLET, FILM COATED ORAL at 22:24

## 2022-11-09 RX ADMIN — ZOLPIDEM TARTRATE PRN MG: 5 TABLET ORAL at 23:06

## 2022-11-09 RX ADMIN — POTASSIUM CHLORIDE SCH MEQ: 750 TABLET, FILM COATED, EXTENDED RELEASE ORAL at 09:45

## 2022-11-09 RX ADMIN — FUROSEMIDE SCH MG: 10 INJECTION, SOLUTION INTRAMUSCULAR; INTRAVENOUS at 06:24

## 2022-11-09 RX ADMIN — BUDESONIDE SCH MCG: 180 AEROSOL, POWDER RESPIRATORY (INHALATION) at 10:47

## 2022-11-09 RX ADMIN — PANTOPRAZOLE SODIUM SCH MG: 40 TABLET, DELAYED RELEASE ORAL at 09:46

## 2022-11-09 RX ADMIN — CARVEDILOL SCH MG: 3.12 TABLET, FILM COATED ORAL at 22:25

## 2022-11-09 RX ADMIN — RANOLAZINE SCH MG: 500 TABLET, FILM COATED, EXTENDED RELEASE ORAL at 09:46

## 2022-11-09 RX ADMIN — APIXABAN SCH MG: 5 TABLET, FILM COATED ORAL at 09:45

## 2022-11-09 RX ADMIN — HYDROCODONE BITARTRATE AND ACETAMINOPHEN PRN TAB: 10; 325 TABLET ORAL at 06:25

## 2022-11-09 RX ADMIN — ASPIRIN SCH MG: 81 TABLET ORAL at 09:44

## 2022-11-09 RX ADMIN — Medication SCH MG: at 09:46

## 2022-11-09 RX ADMIN — Medication SCH UNIT: at 09:46

## 2022-11-09 RX ADMIN — ALBUTEROL SULFATE PRN MCG: 108 AEROSOL, METERED RESPIRATORY (INHALATION) at 22:32

## 2022-11-10 VITALS — SYSTOLIC BLOOD PRESSURE: 100 MMHG | DIASTOLIC BLOOD PRESSURE: 56 MMHG

## 2022-11-10 VITALS — SYSTOLIC BLOOD PRESSURE: 96 MMHG | DIASTOLIC BLOOD PRESSURE: 63 MMHG

## 2022-11-10 VITALS — DIASTOLIC BLOOD PRESSURE: 73 MMHG | SYSTOLIC BLOOD PRESSURE: 125 MMHG

## 2022-11-10 VITALS — DIASTOLIC BLOOD PRESSURE: 56 MMHG | SYSTOLIC BLOOD PRESSURE: 90 MMHG

## 2022-11-10 VITALS — SYSTOLIC BLOOD PRESSURE: 126 MMHG | DIASTOLIC BLOOD PRESSURE: 75 MMHG

## 2022-11-10 VITALS — SYSTOLIC BLOOD PRESSURE: 121 MMHG | DIASTOLIC BLOOD PRESSURE: 71 MMHG

## 2022-11-10 VITALS — DIASTOLIC BLOOD PRESSURE: 68 MMHG | SYSTOLIC BLOOD PRESSURE: 110 MMHG

## 2022-11-10 VITALS — DIASTOLIC BLOOD PRESSURE: 70 MMHG | SYSTOLIC BLOOD PRESSURE: 141 MMHG

## 2022-11-10 VITALS — SYSTOLIC BLOOD PRESSURE: 122 MMHG | DIASTOLIC BLOOD PRESSURE: 74 MMHG

## 2022-11-10 VITALS — DIASTOLIC BLOOD PRESSURE: 67 MMHG | SYSTOLIC BLOOD PRESSURE: 129 MMHG

## 2022-11-10 VITALS — SYSTOLIC BLOOD PRESSURE: 99 MMHG | DIASTOLIC BLOOD PRESSURE: 62 MMHG

## 2022-11-10 VITALS — DIASTOLIC BLOOD PRESSURE: 66 MMHG | SYSTOLIC BLOOD PRESSURE: 146 MMHG

## 2022-11-10 VITALS — SYSTOLIC BLOOD PRESSURE: 129 MMHG | DIASTOLIC BLOOD PRESSURE: 84 MMHG

## 2022-11-10 VITALS — SYSTOLIC BLOOD PRESSURE: 125 MMHG | DIASTOLIC BLOOD PRESSURE: 73 MMHG

## 2022-11-10 LAB
ALBUMIN SERPL-MCNC: 3.3 G/DL (ref 3.4–5)
ALP SERPL-CCNC: 83 U/L (ref 45–117)
ALT SERPL-CCNC: 55 U/L (ref 13–56)
ANION GAP SERPL CALCULATED.3IONS-SCNC: 6 MMOL/L (ref 5–15)
BILIRUB SERPL-MCNC: 0.6 MG/DL (ref 0.2–1)
BUN SERPL-MCNC: 27 MG/DL (ref 7–18)
BUN/CREAT SERPL: 39.1
CALCIUM SERPL-MCNC: 8.7 MG/DL (ref 8.5–10.1)
CHLORIDE SERPL-SCNC: 101 MMOL/L (ref 98–107)
CO2 SERPL-SCNC: 33 MMOL/L (ref 21–32)
GLUCOSE SERPL-MCNC: 163 MG/DL (ref 74–106)
HCT VFR BLD AUTO: 43.6 % (ref 36–46)
HGB BLD-MCNC: 14.6 G/DL (ref 12.2–16.2)
MCH RBC QN AUTO: 27.6 PG (ref 28–32)
MCV RBC AUTO: 82.1 FL (ref 80–100)
NRBC BLD QL AUTO: 0.1 %
POTASSIUM SERPL-SCNC: 3.5 MMOL/L (ref 3.5–5.1)
PROT SERPL-MCNC: 7.4 G/DL (ref 6.4–8.2)
SODIUM SERPL-SCNC: 140 MMOL/L (ref 136–145)

## 2022-11-10 RX ADMIN — CARVEDILOL SCH MG: 3.12 TABLET, FILM COATED ORAL at 20:54

## 2022-11-10 RX ADMIN — AZITHROMYCIN DIHYDRATE SCH MLS/HR: 500 INJECTION, POWDER, LYOPHILIZED, FOR SOLUTION INTRAVENOUS at 10:57

## 2022-11-10 RX ADMIN — ATORVASTATIN CALCIUM SCH MG: 20 TABLET, FILM COATED ORAL at 20:37

## 2022-11-10 RX ADMIN — FUROSEMIDE SCH MG: 40 TABLET ORAL at 11:54

## 2022-11-10 RX ADMIN — FUROSEMIDE SCH MG: 40 TABLET ORAL at 11:00

## 2022-11-10 RX ADMIN — CARVEDILOL SCH MG: 3.12 TABLET, FILM COATED ORAL at 11:00

## 2022-11-10 RX ADMIN — PANTOPRAZOLE SODIUM SCH MG: 40 TABLET, DELAYED RELEASE ORAL at 10:58

## 2022-11-10 RX ADMIN — HYDROCODONE BITARTRATE AND ACETAMINOPHEN PRN TAB: 10; 325 TABLET ORAL at 08:28

## 2022-11-10 RX ADMIN — ASPIRIN SCH MG: 81 TABLET ORAL at 10:58

## 2022-11-10 RX ADMIN — ZOLPIDEM TARTRATE PRN MG: 5 TABLET ORAL at 20:53

## 2022-11-10 RX ADMIN — BUDESONIDE SCH MCG: 180 AEROSOL, POWDER RESPIRATORY (INHALATION) at 10:38

## 2022-11-10 RX ADMIN — POTASSIUM CHLORIDE SCH MEQ: 750 TABLET, FILM COATED, EXTENDED RELEASE ORAL at 10:58

## 2022-11-10 RX ADMIN — Medication SCH MG: at 10:58

## 2022-11-10 RX ADMIN — Medication SCH UNIT: at 11:01

## 2022-11-10 RX ADMIN — HYDROCODONE BITARTRATE AND ACETAMINOPHEN PRN TAB: 10; 325 TABLET ORAL at 17:47

## 2022-11-10 RX ADMIN — APIXABAN SCH MG: 5 TABLET, FILM COATED ORAL at 10:58

## 2022-11-10 RX ADMIN — ALBUTEROL SULFATE PRN MCG: 108 AEROSOL, METERED RESPIRATORY (INHALATION) at 19:52

## 2022-11-10 RX ADMIN — LOSARTAN POTASSIUM SCH MG: 25 TABLET, FILM COATED ORAL at 10:59

## 2022-11-10 RX ADMIN — FUROSEMIDE SCH MG: 40 TABLET ORAL at 10:00

## 2022-11-10 RX ADMIN — RANOLAZINE SCH MG: 500 TABLET, FILM COATED, EXTENDED RELEASE ORAL at 20:38

## 2022-11-10 RX ADMIN — RANOLAZINE SCH MG: 500 TABLET, FILM COATED, EXTENDED RELEASE ORAL at 10:58

## 2022-11-10 RX ADMIN — APIXABAN SCH MG: 5 TABLET, FILM COATED ORAL at 20:38

## 2022-11-10 RX ADMIN — BUDESONIDE SCH MCG: 180 AEROSOL, POWDER RESPIRATORY (INHALATION) at 19:51

## 2022-11-11 VITALS — DIASTOLIC BLOOD PRESSURE: 51 MMHG | SYSTOLIC BLOOD PRESSURE: 104 MMHG

## 2024-11-28 ENCOUNTER — HOSPITAL ENCOUNTER (INPATIENT)
Dept: HOSPITAL 15 - ER | Age: 64
LOS: 3 days | Discharge: HOME | DRG: 133 | End: 2024-12-01
Attending: FAMILY MEDICINE | Admitting: HOSPITALIST
Payer: MEDICAID

## 2024-11-28 VITALS
SYSTOLIC BLOOD PRESSURE: 140 MMHG | DIASTOLIC BLOOD PRESSURE: 74 MMHG | OXYGEN SATURATION: 96 % | TEMPERATURE: 97.8 F | RESPIRATION RATE: 13 BRPM | HEART RATE: 82 BPM

## 2024-11-28 VITALS — OXYGEN SATURATION: 93 %

## 2024-11-28 VITALS — RESPIRATION RATE: 15 BRPM | OXYGEN SATURATION: 94 % | HEART RATE: 81 BPM

## 2024-11-28 VITALS
DIASTOLIC BLOOD PRESSURE: 74 MMHG | TEMPERATURE: 97.8 F | OXYGEN SATURATION: 96 % | HEART RATE: 82 BPM | SYSTOLIC BLOOD PRESSURE: 140 MMHG | RESPIRATION RATE: 18 BRPM

## 2024-11-28 VITALS — BODY MASS INDEX: 23.48 KG/M2 | WEIGHT: 132.5 LBS | HEIGHT: 63 IN

## 2024-11-28 VITALS
HEART RATE: 79 BPM | OXYGEN SATURATION: 93 % | DIASTOLIC BLOOD PRESSURE: 63 MMHG | TEMPERATURE: 97.9 F | RESPIRATION RATE: 20 BRPM | SYSTOLIC BLOOD PRESSURE: 135 MMHG

## 2024-11-28 VITALS — OXYGEN SATURATION: 94 %

## 2024-11-28 DIAGNOSIS — Z90.49: ICD-10-CM

## 2024-11-28 DIAGNOSIS — I25.10: ICD-10-CM

## 2024-11-28 DIAGNOSIS — Z79.82: ICD-10-CM

## 2024-11-28 DIAGNOSIS — J44.1: ICD-10-CM

## 2024-11-28 DIAGNOSIS — J44.0: ICD-10-CM

## 2024-11-28 DIAGNOSIS — I48.91: ICD-10-CM

## 2024-11-28 DIAGNOSIS — Q24.9: ICD-10-CM

## 2024-11-28 DIAGNOSIS — Z95.810: ICD-10-CM

## 2024-11-28 DIAGNOSIS — J96.01: Primary | ICD-10-CM

## 2024-11-28 DIAGNOSIS — E78.00: ICD-10-CM

## 2024-11-28 DIAGNOSIS — I25.2: ICD-10-CM

## 2024-11-28 DIAGNOSIS — I50.43: ICD-10-CM

## 2024-11-28 DIAGNOSIS — Z86.711: ICD-10-CM

## 2024-11-28 DIAGNOSIS — Z83.3: ICD-10-CM

## 2024-11-28 DIAGNOSIS — Z79.899: ICD-10-CM

## 2024-11-28 DIAGNOSIS — I11.0: ICD-10-CM

## 2024-11-28 DIAGNOSIS — Z88.0: ICD-10-CM

## 2024-11-28 DIAGNOSIS — J18.9: ICD-10-CM

## 2024-11-28 LAB
ANION GAP SERPL CALCULATED.3IONS-SCNC: 9 MMOL/L (ref 5–15)
BUN SERPL-MCNC: 16 MG/DL (ref 9–23)
BUN/CREAT SERPL: 20.5 (ref 10–20)
CALCIUM SERPL-MCNC: 8.6 MG/DL (ref 8.7–10.4)
CHLORIDE SERPL-SCNC: 107 MMOL/L (ref 98–107)
CO2 SERPL-SCNC: 27 MMOL/L (ref 20–31)
GLUCOSE SERPL-MCNC: 95 MG/DL (ref 74–106)
HCT VFR BLD AUTO: 33.6 % (ref 36–46)
HGB BLD-MCNC: 10.7 G/DL (ref 12.2–16.2)
MCH RBC QN AUTO: 23.9 PG (ref 28–32)
MCV RBC AUTO: 75.1 FL (ref 80–100)
NRBC BLD QL AUTO: 0.1 %
POTASSIUM SERPL-SCNC: 4.4 MMOL/L (ref 3.5–5.1)
SODIUM SERPL-SCNC: 143 MMOL/L (ref 136–145)
WBC CLUMPS UR QL AUTO: PRESENT /HPF

## 2024-11-28 PROCEDURE — 96374 THER/PROPH/DIAG INJ IV PUSH: CPT

## 2024-11-28 PROCEDURE — 96375 TX/PRO/DX INJ NEW DRUG ADDON: CPT

## 2024-11-28 PROCEDURE — 99291 CRITICAL CARE FIRST HOUR: CPT

## 2024-11-28 RX ADMIN — ATORVASTATIN CALCIUM SCH MG: 20 TABLET, FILM COATED ORAL at 18:56

## 2024-11-28 RX ADMIN — ACETAMINOPHEN ONE MG: 325 TABLET ORAL at 14:49

## 2024-11-28 RX ADMIN — FUROSEMIDE SCH MG: 10 INJECTION, SOLUTION INTRAMUSCULAR; INTRAVENOUS at 22:00

## 2024-11-28 RX ADMIN — AZITHROMYCIN MONOHYDRATE SCH MG: 250 TABLET ORAL at 18:55

## 2024-11-28 RX ADMIN — MORPHINE SULFATE PRN MG: 2 INJECTION, SOLUTION INTRAMUSCULAR; INTRAVENOUS at 22:25

## 2024-11-28 RX ADMIN — HYDROCODONE BITARTRATE AND ACETAMINOPHEN ONE TAB: 5; 325 TABLET ORAL at 15:59

## 2024-11-28 RX ADMIN — CEFTRIAXONE SODIUM SCH MLS/HR: 1 INJECTION, POWDER, FOR SOLUTION INTRAMUSCULAR; INTRAVENOUS at 18:56

## 2024-11-28 RX ADMIN — ACETAMINOPHEN PRN MG: 325 TABLET ORAL at 22:25

## 2024-11-28 RX ADMIN — IPRATROPIUM BROMIDE ONE MG: 0.5 SOLUTION RESPIRATORY (INHALATION) at 13:51

## 2024-11-28 RX ADMIN — METHYLPREDNISOLONE SODIUM SUCCINATE SCH MG: 40 INJECTION, POWDER, FOR SOLUTION INTRAMUSCULAR; INTRAVENOUS at 21:48

## 2024-11-28 RX ADMIN — ALBUTEROL SULFATE ONE MG: 2.5 SOLUTION RESPIRATORY (INHALATION) at 13:51

## 2024-11-28 RX ADMIN — HYDROCODONE BITARTRATE AND ACETAMINOPHEN SCH TAB: 10; 325 TABLET ORAL at 18:56

## 2024-11-28 RX ADMIN — METHYLPREDNISOLONE SODIUM SUCCINATE ONE MG: 125 INJECTION, POWDER, FOR SOLUTION INTRAMUSCULAR; INTRAVENOUS at 14:03

## 2024-11-28 RX ADMIN — CARVEDILOL SCH MG: 3.12 TABLET, FILM COATED ORAL at 21:50

## 2024-11-28 RX ADMIN — FUROSEMIDE ONE MG: 10 INJECTION, SOLUTION INTRAMUSCULAR; INTRAVENOUS at 15:59

## 2024-11-28 NOTE — DVHHP2
History of Present Illness


Reason for Visit:  Shortness of breath


History of Present Illness


64-year-old morbidly obese female with a past medical history of CHF COPD 

hypertension MI comes to the ED with complaints of stated COPD and CHF 

exacerbation patient states that she was recently in [place] and afterwards 

came back home and hit started feeling sick patient has of now was desaturating 

not feeling very well having trouble catch her breath and was placed on oxygen 

supplementation in the ED patient was recommended for ED evaluation and 

treatment and management and an inpatient setting


Cardiovascular:  AFIB, CAD, CHF, HTN, MI


Pulmonary:  COPD





Review of Systems


Constitutional:  Yes: Weakness; No: Fever, Chills, Sweats, Malaise, Other


Eyes:  No: Pain, Vision change, Conjunctivae inflammation, Eyelid inflammation, 

Other, Redness


ENT:  No: Ear pain, Ear discharge, Nose pain, Nose discharge, Nose congestion, 

Mouth pain, Mouth swelling, Throat pain, Throat swelling, Other


Respiratory:  Shortness of breath; No: Cough, Dry, SOB with excertion, Wheezing,

Hemoptysis, Pleuritic Pain, Sputum, Wheezing, Other


Cardiovascular:  Chest Pain, Palpitations; No: Orthopnea, Paroxysmal Noc. 

Dyspnea, Edema, Lt Headedness, Other


Gastrointestinal:  No: Nausea, Vomiting, Abdominal Pain, Diarrhea, Constipation,

Melena, Hematochezia, Other


Genitourinary:  No Dysuria, No Frequency, No Incontinence, No Hematuria, No 

Retention, No Other


Musculoskeletal:  No: other, neck pain, shoulder pain, arm pain, back pain, hand

pain, leg pain, foot pain


Skin:  No: Rash, Lesions, Jaundice, Bruising, Other


Neurological:  No: Weakness, Numbness, Incoordination, Change in speech, 

Confusion, Seizures, Other


Allergies:  


Coded Allergies:  


     Penicillins (Verified  Allergy, Unknown, 9/12/18)





Exam


Vital Signs





Vital Signs








  Date Time  Temp Pulse Resp B/P (MAP) Pulse Ox O2 Delivery O2 Flow Rate FiO2


 


11/28/24 16:00  88 18 113/48 (69) 90   


 


11/28/24 15:51 99.9       


 


11/28/24 14:00      Nasal Cannula* 6 44








General Appearance:  Alert, Oriented X3


HEENT:  Atraumatic, PERRLA


Respiratory:  Clear to auscultation, Normal air movement


Cardiovascular:  Regular rate, Normal S1, Normal S2


Abdominal:  Normal bowel sounds, No tenderness


Extremities:  No clubbing, No cyanosis, No edema (Edema noted)


Skin:  No rashes, No breakdown, No significant lesion


Neuro:  Normal gait, Normal speech


Psych/Mental Status:  Mood NL





Labs/Xrays





                                      Labs








Test


 11/28/24


16:00 11/28/24


14:30 11/28/24


13:06 Range/Units


 


 


Urine Color Light-yellow    Yellow  


 


Urine Clarity Clear    Clear  


 


Urine pH 6.5    5.0-9.0  


 


Urine Specific Gravity 1.009    1.001-1.035  


 


Urine Protein Negative    Negative  


 


Urine Ketones Negative    Negative  


 


Urine Blood Negative    Negative  /uL


 


Urine Nitrite Negative    Negative  


 


Urine Bilirubin Negative    Negative  


 


Urine Urobilinogen Normal    Negative  mg/dL


 


Urine Leukocyte Esterase Negative    Negative  /uL


 


Urine RBC 1    0 - 4  /hpf


 


Urine WBC 3    0 - 5  /hpf


 


Urine WBC Clumps Present    None Seen  /hpf


 


Urine Squamous Epithelial


Cells Few 


 


 


 <5  /hpf





 


Urine Bacteria None seen    None Seen  /hpf


 


Urine Mucus Few    None Seen  


 


Urine Glucose Normal    Normal  mg/dL


 


Lactic Acid Level  1.7   0.4-2.0  mmol/L


 


White Blood Count


 


 


 7.0 


 4.4-10.8


10^3/uL


 


Red Blood Count


 


 


 4.48 


 4.0-5.20


10^6/uL


 


Hemoglobin   10.7 L 12.2-16.2  g/dL


 


Hematocrit   33.6 L 36.0-46.0  %


 


Mean Corpuscular Volume   75.1 L 80.0-100.0  fL


 


Mean Corpuscular Hemoglobin   23.9 L 28.0-32.0  pg


 


Mean Corpuscular Hemoglobin


Concent 


 


 31.8 L


 32.0-36.0  g/dL





 


Red Cell Distribution Width   16.2 H 11.8-14.3  %


 


Platelet Count


 


 


 210 


 140-450


10^3/uL


 


Mean Platelet Volume   6.9  6.9-10.8  fL


 


Neutrophils (%) (Auto)   83.9 H 37.0-80.0  %


 


Lymphocytes (%) (Auto)   4.7 L 10.0-50.0  %


 


Monocytes (%) (Auto)   10.6  0.0-12.0  %


 


Eosinophils (%) (Auto)   0.4  0.0-7.0  %


 


Basophils (%) (Auto)   0.4  0.0-2.0  %


 


Neutrophils # (Auto)


 


 


 5.9 


 1.6-8.6  10


^3/uL


 


Lymphocytes # (Auto)


 


 


 0.3 L


 0.4-5.4  10


^3/uL


 


Monocytes # (Auto)   0.7  0-1.3  10 ^3/uL


 


Eosinophils # (Auto)   0  0-0.8  10 ^3/uL


 


Basophils # (Auto)   0  0-0.2  10 ^3/uL


 


Nucleated Red Blood Cells   0.1   %


 


Sodium Level   143  136-145  mmol/L


 


Potassium Level   4.4  3.5-5.1  mmol/L


 


Chloride Level   107    mmol/L


 


Carbon Dioxide Level   27  20-31  mmol/L


 


Anion Gap   9  5-15  


 


Blood Urea Nitrogen   16  9-23  mg/dL


 


Creatinine


 


 


 0.78 


 0.550-1.02


mg/dL


 


Glomerular Filtration Rate


Calc 


 


 86 


 >90  mL/min





 


BUN/Creatinine Ratio   20.5 H 10.0-20.0  


 


Serum Glucose   95    mg/dL


 


Calcium Level   8.6 L 8.7-10.4  mg/dL


 


B-Type Natriuretic Peptide   430.29  0-100  pg/mL











Assessment/Plan


Assessment/Plan


[place] [facility] 





[place] on chronic combined CHF exacerbation 


Continue patient's with medication regimen from home 


Increase diuretics B.i.d. 


Continue with cardiac medications


BNP elevated over 400





Acute on chronic COPD exacerbation 


Treat as pneumonia at this point in time 


P.r.n. breathing treatments 


IV antibiotics monitor for signs of fluid overload


B.i.d. steroids





Chronic findings history of AFib


Currently rate controlled 


History of hypertension 


Continue home medications and diuretics


Plan discussed with:  Patient


My Orders





                           Orders - NEELIMA MAC MD








Procedure Category Date Status





  Time 


 


Apixaban (Eliquis) PHA 11/29/24 Transmitted





  10:00 


 


Carisoprodol Tablet PHA 11/28/24 Transmitted





 (Soma Tablet)  22:00 


 


Carvedilol Tablet PHA 11/28/24 Transmitted





 (Coreg Tablet)  22:00 


 


Diazepam Tablet PHA 11/28/24 Transmitted





 (Valium Tablet)  22:00 


 


Hydrocodone-Acet PHA 11/28/24 Transmitted





10/325mg Tab (Norco  18:00 


 


Losartan Tablet PHA 11/29/24 Transmitted





 (Cozaar Tablet)  10:00 


 


 (Nf) Potassium PHA 11/29/24 Transmitted





Chloride (Potassium  10:00 


 


 (Nf) Simvastatin PHA 11/28/24 Transmitted





  18:00 


 


 (Nf) Temazepam PHA 11/28/24 Transmitted





  22:00 


 


Furosemide Injection PHA 11/28/24 Transmitted





 (Lasix Injection)  22:00 


 


Albuterol Medneb PHA 11/28/24 Transmitted





 (Ventolin Medneb)  18:00 


 


Ipratropium Medneb PHA 11/28/24 Transmitted





 (Atrovent Medneb)  18:00 


 


Med Neb Initial RT 11/28/24 Transmitted





Treatment  17:48 


 


Methylprednisolone PHA 11/28/24 Transmitted





Sod Succ (Solu Medrol  22:00 


 


Ceftriaxone Ivpb PHA 11/28/24 Transmitted





Rocephin  18:00 


 


Azithromycin Tablet PHA 11/28/24 Transmitted





 (Zithromax Tablet)  18:00 


 


Admit ADMIT 11/28/24 Transmitted





  17:48 


 


Code Status CODE 11/28/24 Transmitted





  17:48 


 


Vital Signs JOIE 11/28/24 Transmitted





  17:48 


 


Review Orders With [facility] 11/28/24 Verified





Adm.Md  17:48 


 


Consistent DIET 11/28/24 Verified





Carb(Ccho)Diabetes  Dinner 


 


Lorazepam Tablet PHA 11/28/24 Verified





 (Ativan Tablet)  18:00 


 


Alum & Mag PHA 11/28/24 Verified





Hydrox-Simethicone  18:00 


 


Docusate Sodium PHA 11/28/24 Verified





Capsule (Colace  18:00 


 


Acetaminophen Tablet PHA 11/28/24 Verified





 (Tylenol Tablet)  18:00 


 


Temazepam (Restoril) PHA 11/28/24 Verified





  18:00 


 


Notify Md Of Changes [facility] 11/28/24 Verified





From Base  17:48 


 


Advance Directive [facility] 11/28/24 Verified





  17:48 


 


Basic Metabolic Panel LAB 11/29/24 Verified





  04:00 


 


Complete Blood Count LAB 11/29/24 Verified





  04:00 


 


Patient Condition ORDERS 11/28/24 Verified





  17:48 


 


Allergies [facility] 11/28/24 Verified





  17:48 


 


Hydrocodone-Acet PHA 11/28/24 Verified





5/325mg Tab (Norco  18:00 


 


Ondansetron Hcl PHA 11/28/24 Verified





 (Zofran)  18:00 


 


Morphine 2mg Iv Q4hprn PHA 11/28/24 Verified





  18:00 


 


Notify Md Of Changes [facility] 11/28/24 Verified





From Base  17:48 


 


Rhythm Strips Once [facility] 11/28/24 Verified





Every Shift  17:48 


 


Oxygen By Nasal RT 11/28/24 Verified





Cannula  17:48 








Problem List:  


(1) Acute exacerbation of COPD with asthma


(2) Acute exacerbation of CHF (congestive heart failure)


(3) Uncontrolled diabetes mellitus


(4) Hypertension


(5) Bilateral pneumonia





Date of Service:  Nov 28, 2024


Billing Provider:  NEELIMA MAC MD


Common Visit Codes:  99223-INITIAL  INP/OBS CARE (HIGH)











NEELIMA MAC MD             Nov 28, 2024 18:20

## 2024-11-28 NOTE — ECG
[facility]
                                       
Test Date:    2024               Test Time:    13:26:06
Pat Name:     THOMAS AVILA         Department:   ER
Patient ID:   [id number]           Room:          
Gender:       F                        Technician:   STEPHEN
:          1960               Requested By: GEM ACOSTA
Order Number: [id number]        Reading MD:    
                                 Measurements
Intervals                              Axis          
Rate:         84                       P:            56
CT:           158                      QRS:          121
QRSD:         149                      T:            225
QT:           411                                    
QTc:          486                                    
                           Interpretive Statements
Sinus rhythm
Nonspecific intraventricular conduction delay
Probable lateral infarct, age indeterminate
Probable anteroseptal infarct, recent

Please click the below link to view image of tracing.

## 2024-11-28 NOTE — DVH
CHEST RADIOGRAPH

Indication: sob

Technique: Single frontal view of the chest was obtained

Comparison: CXRP on DOS: 11/10/22, CHEST PORTABLE on DOS: 11/10/22, CXRP on DOS: 11/9/22

FINDINGS: 

Lines and Tubes: None

Lungs: Diffuse interstitial prominence with obscuration of the left hemidiaphragm and indistinctness 
of the right hemidiaphragm.

No pneumothorax. 

Cardiomediastinal contours: Moderate cardiomegaly

Bones: No acute osseous abnormality.

IMPRESSION:

Moderate cardiomegaly with findings suggestive of congestive heart failure.  Possible small left with
 trace right-sided pleural effusion.  Underlying infectious process can not be excluded.

Electronically Signed by: Flaca Douglas at 11/28/2024 15:03:36 PM

## 2024-11-28 NOTE — ED.PDOC
SOB-HPI


HPI Comments


64y F who presents to the ED via EMS for chief complaint of shortness of breath.

Pt states she has been having exacerbation of COPD and CHF since last night PM 

after recently returning from [place], [place]. Pt states she used her albuterol

inhaler last night but states it  and pt has no relief. Pt states she 

continued to fell short of and started to have chest pain earlier this AM and 

called EMS to the scene. Upon EMS arrival, pt has vitals checked and states 02 

sat was 84% on room air and pt was given breathing treatment and placed on 6 L 

via simple mask and pt 02 sat hermilo to 96% and pt was brought to the ED for 

further evaluation; Pt now in the ED, states she also has been having associated

nausea and vomiting. Pt otherwise has noted history of PE, AFIB on eliquis, HTN 

and MI. Pt states she has been to [facility] in the past for similar symptoms and was 

hospitalized.  Pt otherwise denies any other symptoms at this time.


Chief Complaint:  shortness of breath


Time Seen by MD:  13:27


Primary Care Provider:  JOHANNA


Information Source:  Patient, Emergency Med Personnel


Mode of Arrival:  EMS


Brought in by:  


EMS


Severity:  Moderate


Timing:  Hours


Duration:  Since onset


Context:  At Rest, With Light Exertion


PE Risk Factors:  None


History of:  COPD, CHF, DVT/PE


Prehospital treatment:  Breathing Tx, Oxygen


Modifying Factors:  Exertion, Inhaler


Associated Signs and Symptoms:  Chest Pain


Quality:  Pressure





Past Medical History


PAST MEDICAL HISTORY:  AFIB, CHF, COPD, HTN, MI


Surgical History:  Appendectomy, Pacemaker


GYN History:  No Pertinent GYN History





Family History


Family History:  No family hx of Cancer, No family hx of DM, No family hx of 

Stroke





Social History


Smoker:  Non-Smoker


Alcohol:  Denies ETOH Use


Drugs:  Denies Drug Use


Lives In:  Home





Constitutional:  denies: chills, diaphoresis, fatigue, fever, malaise, sweats, 

weakness, others


EENTM:  denies: blurred vision, double vision, ear bleeding, ear discharge, ear 

drainage, ear pain, ear ringing, eye pain, eye redness, hearing loss, mouth 

pain, mouth swelling, nasal discharge, nose bleeding, nose congestion, nose 

pain, photophobia, tearing, throat pain, throat swelling, voice changes, others


Respiratory:  reports: SOB at rest, shortness of breath; denies: cough, 

hemoptysis, orthopnea, SOB with excertion, stridor, wheezing, others


Cardiovascular:  reports: chest pain; denies: dizzy spells, diaphoresis, Dyspnea

on exertion, edema, irregular heart beat, left arm pain, lightheadedness, 

palpitations, PND, syncope, others


Gastrointestinal:  reports: nausea, vomiting; denies: abdomen distended, 

abdominal pain, blood streaked bowels, constipated, diarrhea, dysphagia, 

difficulty swallowing, hematemesis, melena, poor appetite, poor fluid intake, 

rectal bleeding, rectal pain, others


Genitourinary:  denies: abnormal vagina bleeding, burning, dyspareunia, dysuria,

flank pain, frequency, hematuria, incontinence, pain, pregnant, vagina 

discharge, urgency, others


Neurological:  denies: dizziness, fainting, headache, left sided numbness, left 

sided weakness, numbness, paresthesia, pre-existing deficit, right sided 

numbness, right sided weakness, seizure, speech problems, tingling, tremors, 

weakness, others


Musculoskeletal:  denies: back pain, gout, joint pain, joint swelling, muscle 

pain, muscle stiffness, neck pain, others


Integumetry:  denies: bruises, change in color, change in hair/nails, dryness, 

laceration, lesions, lumps, rash, wounds, others


Allergic/Immunocompromised:  denies: Difficulty Healing, Frequent Infections, 

Hives, Itching, others


Hematologic/Lymphatic:  denies: anemia, blood clots, easy bleeding, easy 

bruising, swollen glands, others


Endocrine:  denies: excessive hunger, excessive sweating, excessive thirst, 

excessive urination, flushing, intolerance to cold, intolerance to heat, 

unexplained weight gain, unexplained weight loss, others


Psychiatric:  denies: anxiety, bipolar disorder, depression, hopeless, panic 

disorder, schizophrenia, sleepless, suicidal, others


All Other Systems:  Reviewed and Negative





Physical Exam


General Appearance:  Moderate Distress


HEENT:  Normal ENT Inspection, Pharynx Normal, TMs Normal


Neck:  Full Range of Motion, Non-Tender, Normal, Normal Inspection


Respiratory:  Accessory Muscle Use, Chest Non-Tender, Decreased Breath Sounds, 

Respiratory Distress, Wheezing


Cardiovascular:  No Edema, No JVD, No Murmur, No Gallop, Tachycardia


Breast Exam:  Deferred


Gastrointestinal:  No Organomegaly, Non Tender, No Pulsatile Mass, Normal Bowel 

Sounds, Soft


Genitalia:  Deferred


Pelvic:  Deferred


Rectal:  Deferred


Extremities:  No calf tenderness, Normal capillary refill, Normal inspection, 

Normal range of motion, Non-tender, No pedal edema


Musculoskeletal :  


   Apperance:  Normal


Neurologic:  Alert, CNs II-XII nml as Tested, No Motor Deficits, Normal Affect, 

Normal Mood, No Sensory Deficits


Cerebellar Function:  Normal


Reflexes:  Normal


Skin:  Dry, Normal Color, Warm


Lymphatic:  No Adenopathy





EKG


EKG :  


   Pulse Rate (adult):  84


   Axis:  Normal


   Cardiac Rhythm:  NSR


   Block:  None


   Hypertrophy:  None


   ST:  Normal





Was a procedure done?


Was a procedure done?:  No





Differential Dx


Differential Diagnosis:  Bronchitis, CHF, COPD, Myocardial infarction, 

Pneumonia, Pulmonary Embolism, Respiratory Distress, URI


Comments


COVID, Influenza A and B





X-Ray, Labs, Meds, VS





                                   Vital Signs








  Date Time  Temp Pulse Resp B/P (MAP) Pulse Ox O2 Delivery O2 Flow Rate FiO2


 


24 14:49 100.2       


 


24 14:00 100.2 81 15 122/53 (76) 94   





 100.2       


 


24 14:00  81 15  94 Nasal Cannula* 6 44


 


24 13:53   24  88 Nasal Cannula* 6 44


 


24 13:26  84      


 


24 13:25 98.7 86 24 109/65 (80) 92   


 


24 13:25   24  92 Simple Mask* 6 50








                                       Lab








Test


 24


14:30 24


13:06 Range/Units


 


 


Lactic Acid Level 1.7   0.4-2.0  mmol/L


 


White Blood Count


 


 7.0 


 4.4-10.8


10^3/uL


 


Red Blood Count


 


 4.48 


 4.0-5.20


10^6/uL


 


Hemoglobin  10.7 L 12.2-16.2  g/dL


 


Hematocrit  33.6 L 36.0-46.0  %


 


Mean Corpuscular Volume  75.1 L 80.0-100.0  fL


 


Mean Corpuscular Hemoglobin  23.9 L 28.0-32.0  pg


 


Mean Corpuscular Hemoglobin


Concent 


 31.8 L


 32.0-36.0  g/dL





 


Red Cell Distribution Width  16.2 H 11.8-14.3  %


 


Platelet Count


 


 210 


 140-450


10^3/uL


 


Mean Platelet Volume  6.9  6.9-10.8  fL


 


Neutrophils (%) (Auto)  83.9 H 37.0-80.0  %


 


Lymphocytes (%) (Auto)  4.7 L 10.0-50.0  %


 


Monocytes (%) (Auto)  10.6  0.0-12.0  %


 


Eosinophils (%) (Auto)  0.4  0.0-7.0  %


 


Basophils (%) (Auto)  0.4  0.0-2.0  %


 


Neutrophils # (Auto)


 


 5.9 


 1.6-8.6  10


^3/uL


 


Lymphocytes # (Auto)


 


 0.3 L


 0.4-5.4  10


^3/uL


 


Monocytes # (Auto)  0.7  0-1.3  10 ^3/uL


 


Eosinophils # (Auto)  0  0-0.8  10 ^3/uL


 


Basophils # (Auto)  0  0-0.2  10 ^3/uL


 


Nucleated Red Blood Cells  0.1   %


 


Sodium Level  143  136-145  mmol/L


 


Potassium Level  4.4  3.5-5.1  mmol/L


 


Chloride Level  107    mmol/L


 


Carbon Dioxide Level  27  20-31  mmol/L


 


Anion Gap  9  5-15  


 


Blood Urea Nitrogen  16  9-23  mg/dL


 


Creatinine


 


 0.78 


 0.550-1.02


mg/dL


 


Glomerular Filtration Rate


Calc 


 86 


 >90  mL/min





 


BUN/Creatinine Ratio  20.5 H 10.0-20.0  


 


Serum Glucose  95    mg/dL


 


Calcium Level  8.6 L 8.7-10.4  mg/dL


 


B-Type Natriuretic Peptide  430.29  0-100  pg/mL








                               Current Medications








 Medications


  (Trade)  Dose


 Ordered  Sig/[facility]


 Route  Start Time


 Stop Time Status Last Admin


 


 Methylprednisolone


 Sodium Succinate


  (Solu Medrol)  125 mg  ONCE  ONCE


 IV  24 13:30


 24 13:31 DC 24 14:03





 


 Ipratropium


 Bromide


  (Atrovent Medneb)  1 mg  ONCE  ONCE


 N  24 13:30


 24 13:31 DC 24 13:51





 


 Albuterol


  (Ventolin Medneb)  10 mg  ONCE  ONCE


 N  24 13:30


 24 13:31 DC 24 13:51





 


 Acetaminophen


  (Tylenol Tablet)  650 mg  ONCE  ONCE


 PO  24 14:15


 24 14:16 DC 24 14:49








IV Hep-Lock was established


The CBC shows anemia with a hemoglobin of 10.7 and hematocrit of 32.6 


The chemistry panel is within normal limits 


The BNP is 430.29 


The patient was given a continuous breathing treatment of albuterol and Atrovent




The patient was given Solu-Medrol 125 mg IV push 


The patient was given acetaminophen 650 mg by mouth 


At this time, the patient was being admitted with a diagnosis of COPD 

exacerbation and CHF 


We discussed the findings with the patient and she is in agreement with the 

management.  


Critical care involves bedside management as well as interpretation is labs and 

images


Images Reviewed?:  Images reviewed and evaluated by me


Time of 1ST Reevaluation:  14:00


Reevaluation 1ST:  Unchanged


Patient Education/Counseling:  Diagnosis, Treatment, Prognosis


Family Education/Counseling:  No Family Present





Departure 1


Departure


Time of Disposition:  15:30


Impression:  


   Primary Impression:  


   Acute on chronic diastolic (congestive) heart failure


   Additional Impression:  


   COPD exacerbation


Disposition:   ADMITTED AS INPATIENT


Admit to:  Tele


Condition:  Fair





Critical Care Note


Critical Care Time?:  Yes (55 min-critical care time only)





Stability


Stability form required:  Yes


Unstable for transfer:  Telemetry monitoring (Telemetry monitoring required), ED

Physician Assesment (Clinical assesment)





Heart Score


Heart Score:  








Heart Score Response (Comments) Value


 


History Moderate Suspicious 1


 


EKG Normal 0


 


Age                                     45-64 1


 


Risk Factors >3 or Hx ASHD 2


 


Troponin Normal limit 0


 


Total  4














I personally scribed for GEM ACOSTA MD ([email or web address]) on 24 at 13:31.  

Electronically submitted by Bharat BALES).





GEM ACOSTA MD              2024 13:31

## 2024-11-29 VITALS
TEMPERATURE: 97.9 F | OXYGEN SATURATION: 96 % | SYSTOLIC BLOOD PRESSURE: 114 MMHG | HEART RATE: 79 BPM | DIASTOLIC BLOOD PRESSURE: 59 MMHG | RESPIRATION RATE: 14 BRPM

## 2024-11-29 VITALS
SYSTOLIC BLOOD PRESSURE: 101 MMHG | TEMPERATURE: 97.8 F | RESPIRATION RATE: 20 BRPM | OXYGEN SATURATION: 97 % | DIASTOLIC BLOOD PRESSURE: 57 MMHG | HEART RATE: 81 BPM

## 2024-11-29 VITALS
TEMPERATURE: 97.8 F | DIASTOLIC BLOOD PRESSURE: 70 MMHG | HEART RATE: 75 BPM | RESPIRATION RATE: 16 BRPM | OXYGEN SATURATION: 96 % | SYSTOLIC BLOOD PRESSURE: 125 MMHG

## 2024-11-29 VITALS
TEMPERATURE: 97.9 F | RESPIRATION RATE: 17 BRPM | HEART RATE: 87 BPM | OXYGEN SATURATION: 96 % | SYSTOLIC BLOOD PRESSURE: 101 MMHG | DIASTOLIC BLOOD PRESSURE: 47 MMHG

## 2024-11-29 VITALS
TEMPERATURE: 97.8 F | DIASTOLIC BLOOD PRESSURE: 68 MMHG | RESPIRATION RATE: 18 BRPM | OXYGEN SATURATION: 98 % | HEART RATE: 72 BPM | SYSTOLIC BLOOD PRESSURE: 124 MMHG

## 2024-11-29 VITALS — RESPIRATION RATE: 18 BRPM | OXYGEN SATURATION: 95 % | HEART RATE: 77 BPM

## 2024-11-29 VITALS
RESPIRATION RATE: 13 BRPM | DIASTOLIC BLOOD PRESSURE: 51 MMHG | OXYGEN SATURATION: 95 % | HEART RATE: 76 BPM | TEMPERATURE: 97.4 F | SYSTOLIC BLOOD PRESSURE: 119 MMHG

## 2024-11-29 VITALS — RESPIRATION RATE: 20 BRPM | HEART RATE: 81 BPM

## 2024-11-29 VITALS — OXYGEN SATURATION: 99 % | HEART RATE: 76 BPM | RESPIRATION RATE: 16 BRPM

## 2024-11-29 VITALS — OXYGEN SATURATION: 98 %

## 2024-11-29 LAB
ANION GAP SERPL CALCULATED.3IONS-SCNC: 7 MMOL/L (ref 5–15)
BUN SERPL-MCNC: 16 MG/DL (ref 9–23)
BUN/CREAT SERPL: 22.2 (ref 10–20)
CALCIUM SERPL-MCNC: 9.2 MG/DL (ref 8.7–10.4)
CHLORIDE SERPL-SCNC: 105 MMOL/L (ref 98–107)
CO2 SERPL-SCNC: 32 MMOL/L (ref 20–31)
GLUCOSE SERPL-MCNC: 128 MG/DL (ref 74–106)
HCT VFR BLD AUTO: 33.1 % (ref 36–46)
HGB BLD-MCNC: 10.7 G/DL (ref 12.2–16.2)
MCH RBC QN AUTO: 24.1 PG (ref 28–32)
MCV RBC AUTO: 74.3 FL (ref 80–100)
NRBC BLD QL AUTO: 0.1 %
POTASSIUM SERPL-SCNC: 4 MMOL/L (ref 3.5–5.1)
SODIUM SERPL-SCNC: 144 MMOL/L (ref 136–145)

## 2024-11-29 RX ADMIN — HYDROCODONE BITARTRATE AND ACETAMINOPHEN PRN TAB: 5; 325 TABLET ORAL at 05:32

## 2024-11-29 RX ADMIN — IBUPROFEN ONE MG: 600 TABLET, FILM COATED ORAL at 21:29

## 2024-11-29 RX ADMIN — APIXABAN SCH MG: 5 TABLET, FILM COATED ORAL at 11:00

## 2024-11-29 RX ADMIN — POTASSIUM CHLORIDE SCH MEQ: 750 TABLET, FILM COATED, EXTENDED RELEASE ORAL at 11:00

## 2024-11-29 RX ADMIN — ALBUTEROL SULFATE PRN MG: 2.5 SOLUTION RESPIRATORY (INHALATION) at 22:46

## 2024-11-29 RX ADMIN — IPRATROPIUM BROMIDE PRN MG: 0.5 SOLUTION RESPIRATORY (INHALATION) at 22:46

## 2024-11-29 RX ADMIN — ONDANSETRON HYDROCHLORIDE PRN MG: 2 INJECTION, SOLUTION INTRAMUSCULAR; INTRAVENOUS at 07:54

## 2024-11-29 NOTE — DVHPN2
Reviewed:  Care Plan, H&P, Labs, Medications, Previous Orders, Radiology


Changes from previous H/P or p:  No Changes


Eyes:  No Pain, No Vision change, No Conjunctivae inflammation, No Eyelid 

inflammation, No Other, No Redness


ENT:  No Ear pain, No Ear discharge, No Nose pain, No Nose discharge, No Nose 

congestion, No Mouth pain, No Mouth swelling, No Throat pain, No Throat 

swelling, No Other


Cardiovascular:  Chest Pain, Palpitations; No Orthopnea, No Paroxysmal Noc. 

Dyspnea, No Edema, No Lt Headedness, No Other


Respiratory:  No Cough, No Dry; Shortness of breath; No SOB with excertion, No 

Wheezing, No Hemoptysis, No Pleuritic Pain, No Sputum, No Other


Gastrointestinal:  No Nausea, No Vomiting, No Abdominal Pain, No Diarrhea, No 

Constipation, No Melena, No Hematochezia, No Other


Genitourinary:  No Dysuria, No Frequency, No Incontinence, No Hematuria, No 

Retention, No Other


Musculoskeletal:  No other, No neck pain, No shoulder pain, No arm pain, No back

pain, No hand pain, No leg pain, No foot pain


Skin:  No Rash, No Lesions, No Jaundice, No Bruising, No Other





Objective


Vitals





Vital Signs








  Date Time  Temp Pulse Resp B/P (MAP) Pulse Ox O2 Delivery O2 Flow Rate FiO2


 


11/29/24 10:59  72  124/68    


 


11/29/24 08:45 97.8  18  98   





 97.8       


 


11/28/24 23:51      Simple Mask* 6 50








Intake/Output











                               Intake and Output 


 


 11/29/24





 07:00


 


Intake Total 979 ml


 


Output Total 0 ml


 


Balance 979 ml


 


 


 


Intake Oral 979 ml


 


Output Stool Total 0 ml


 


# Voids 1








Medications





                               Current Medications








 Medications  Dose


 Ordered  Sig/Prabha


 Route  Start Time


 Stop Time Status Last Admin


Dose Admin


 


 Apixaban  5 mg  DAILY


 PO  11/29/24 10:00


    11/29/24 11:00


5 MG


 


 Carisoprodol  350 mg  BIDP


 PO  11/28/24 22:00


   Hold  





 


 Carvedilol  6.25 mg  BID


 PO  11/28/24 22:00


    11/29/24 10:59


6.25 MG


 


 Diazepam  10 mg  BID


 PO  11/28/24 22:00


    11/29/24 10:58


10 MG


 


 Acetaminophen/


 Hydrocodone Bitart  1 tab  Q6HP


 PO  11/28/24 18:00


   Hold  





 


 Losartan Potassium  20 mg  DAILY


 PO  11/29/24 10:00


     





 


 Potassium Chloride  10 meq  DAILY


 PO  11/29/24 10:00


    11/29/24 11:00


10 MEQ


 


 Atorvastatin


 Calcium  40 mg  QPM


 PO  11/28/24 18:00


     





 


 Furosemide  40 mg  BID


 IV  11/28/24 22:00


    11/29/24 10:58


40 MG


 


 Albuterol  2.5 mg  Q4HPRN  PRN


 NEB  11/28/24 18:00


     





 


 Ipratropium


 Bromide  0.5 mg  Q4HPRN  PRN


 NEB  11/28/24 18:00


     





 


 Methylprednisolone


 Sodium Succinate  40 mg  BID


 IV  11/28/24 22:00


    11/29/24 10:57


40 MG


 


 Ceftriaxone Sodium  50 ml @ 


 100 mls/hr  DAILY


 IV  11/28/24 18:00


    11/29/24 11:00


100 MLS/HR


 


 Azithromycin  500 mg  DAILY


 PO  11/28/24 18:00


    11/29/24 11:00


500 MG


 


 Lorazepam  0.5 mg  Q6HP  PRN


 PO  11/28/24 18:00


     





 


 Al Hydrox/Mg


 Hydrox/Simethicone  30 ml  Q6HP  PRN


 PO  11/28/24 18:00


     





 


 Docusate Sodium  100 mg  BIDPRN  PRN


 PO  11/28/24 18:00


     





 


 Acetaminophen  650 mg  Q6HP  PRN


 PO  11/28/24 18:00


    11/29/24 10:59


650 MG


 


 Temazepam  15 mg  QHSP  PRN


 PO  11/28/24 18:00


     





 


 Acetaminophen/


 Hydrocodone Bitart  1 tab  Q4HP  PRN


 PO  11/28/24 18:00


    11/29/24 05:32


1 TAB


 


 Ondansetron HCl  4 mg  Q4HP  PRN


 IV  11/28/24 18:00


    11/29/24 07:54


4 MG


 


 Morphine Sulfate  2 mg  Q4HPRN  PRN


 IV  11/28/24 18:00


    11/28/24 22:25


2 MG











Laboratory Results


Laboratory Tests


11/29/24 05:32











Chemistry








Test


 11/28/24


13:06 11/29/24


05:32


 


Calcium Level


 8.6 mg/dL


(8.7-10.4)  L 9.2 mg/dL


(8.7-10.4)








Cardiac Markers








Test


 11/28/24


13:06


 


B-Type Natriuretic Peptide


 430.29 pg/mL


(0-100)








Urinalysis








Test


 11/28/24


16:00


 


Urine Color


 Light-yellow


(Yellow)


 


Urine Clarity Clear (Clear)  


 


Urine pH 6.5 (5.0-9.0)  


 


Urine Specific Gravity


 1.009


(1.001-1.035)


 


Urine Protein


 Negative


(Negative)


 


Urine Ketones


 Negative


(Negative)


 


Urine Blood


 Negative /uL


(Negative)


 


Urine Nitrite


 Negative


(Negative)


 


Urine Bilirubin


 Negative


(Negative)


 


Urine Urobilinogen


 Normal mg/dL


(Negative)


 


Urine Leukocyte Esterase


 Negative /uL


(Negative)


 


Urine RBC


 1 /hpf (0 - 4)





 


Urine WBC


 3 /hpf (0 - 5)





 


Urine WBC Clumps


 Present /hpf


(None Seen)


 


Urine Squamous Epithelial


Cells Few /hpf (<5)  





 


Urine Bacteria


 None seen /hpf


(None Seen)


 


Urine Mucus


 Few (None


Seen)


 


Urine Glucose


 Normal mg/dL


(Normal)








Labs and/or images reviewed:  Labs reviewed by me, Image(s) reviewed by me





Assessment/Plan


Assessment/Plan


Acute hypoxic respiratory failure:  6 L Oxygen by nasal cannula


Acute COPD exacerbation:  Albuterol med neb Atrovent med-nebs Solu-Medrol


Acute exacerbation of chronic congestive heart failure :  Lasix potassium

Coreg


History of AFib:  Eliquis 


History of coronary artery disease


Hypotension:  Losartan


History of MI


Hypercholesterolemia:  Lipitor


Bilateral community-acquired pneumonia:  Rocephin azithromycin


Time spent 70 minutes 


Patient is full code 


Advanced care planning time 20 minutes


Plan discussed with:  Patient


My Orders





                            Orders - TRAVIS KUMAR MD








Procedure Category Date Status





  Time 


 


Rapid Influenza A&B LAB 11/29/24 Logged





  12:11 


 


Covid19 Antigen Yessenia LAB 11/29/24 Logged





   











Date of Service:  Nov 29, 2024


Billing Provider:  TRAVIS KUMAR MD


Common Visit Codes:  [id number] CARE 30-74 MIN











TRAVIS KUMAR MD                Nov 29, 2024 12:15

## 2024-11-30 VITALS
RESPIRATION RATE: 20 BRPM | OXYGEN SATURATION: 96 % | TEMPERATURE: 98.2 F | DIASTOLIC BLOOD PRESSURE: 62 MMHG | SYSTOLIC BLOOD PRESSURE: 113 MMHG | HEART RATE: 79 BPM

## 2024-11-30 VITALS — OXYGEN SATURATION: 97 %

## 2024-11-30 VITALS
HEART RATE: 67 BPM | SYSTOLIC BLOOD PRESSURE: 135 MMHG | DIASTOLIC BLOOD PRESSURE: 65 MMHG | RESPIRATION RATE: 18 BRPM | TEMPERATURE: 98.2 F | OXYGEN SATURATION: 90 %

## 2024-11-30 VITALS
SYSTOLIC BLOOD PRESSURE: 101 MMHG | HEART RATE: 75 BPM | DIASTOLIC BLOOD PRESSURE: 34 MMHG | TEMPERATURE: 98.2 F | OXYGEN SATURATION: 91 % | RESPIRATION RATE: 20 BRPM

## 2024-11-30 VITALS
HEART RATE: 77 BPM | RESPIRATION RATE: 20 BRPM | TEMPERATURE: 97.4 F | OXYGEN SATURATION: 97 % | SYSTOLIC BLOOD PRESSURE: 108 MMHG | DIASTOLIC BLOOD PRESSURE: 62 MMHG

## 2024-11-30 VITALS — OXYGEN SATURATION: 96 %

## 2024-11-30 VITALS
HEART RATE: 89 BPM | RESPIRATION RATE: 20 BRPM | SYSTOLIC BLOOD PRESSURE: 126 MMHG | OXYGEN SATURATION: 92 % | DIASTOLIC BLOOD PRESSURE: 72 MMHG | TEMPERATURE: 97.9 F

## 2024-11-30 VITALS — HEART RATE: 67 BPM | RESPIRATION RATE: 18 BRPM

## 2024-11-30 VITALS — OXYGEN SATURATION: 98 %

## 2024-11-30 VITALS — RESPIRATION RATE: 20 BRPM | HEART RATE: 96 BPM

## 2024-11-30 RX ADMIN — LOSARTAN POTASSIUM SCH MG: 50 TABLET, FILM COATED ORAL at 10:16

## 2024-11-30 RX ADMIN — ZOLPIDEM TARTRATE ONE MG: 5 TABLET ORAL at 21:25

## 2024-11-30 NOTE — DVHPN2
Reviewed:  Care Plan, H&P, Labs, Medications, Previous Orders, Radiology


Changes from previous H/P or p:  No Changes


Eyes:  No Pain, No Vision change, No Conjunctivae inflammation, No Eyelid 

inflammation, No Other, No Redness


ENT:  No Ear pain, No Ear discharge, No Nose pain, No Nose discharge, No Nose 

congestion, No Mouth pain, No Mouth swelling, No Throat pain, No Throat 

swelling, No Other


Cardiovascular:  Chest Pain, Palpitations; No Orthopnea, No Paroxysmal Noc. 

Dyspnea, No Edema, No Lt Headedness, No Other


Respiratory:  No Cough, No Dry; Shortness of breath; No SOB with excertion, No 

Wheezing, No Hemoptysis, No Pleuritic Pain, No Sputum, No Other


Gastrointestinal:  No Nausea, No Vomiting, No Abdominal Pain, No Diarrhea, No 

Constipation, No Melena, No Hematochezia, No Other


Genitourinary:  No Dysuria, No Frequency, No Incontinence, No Hematuria, No 

Retention, No Other


Musculoskeletal:  No other, No neck pain, No shoulder pain, No arm pain, No back

pain, No hand pain, No leg pain, No foot pain


Skin:  No Rash, No Lesions, No Jaundice, No Bruising, No Other





Objective


Vitals





Vital Signs








  Date Time  Temp Pulse Resp B/P (MAP) Pulse Ox O2 Delivery O2 Flow Rate FiO2


 


11/30/24 05:00 97.4 77 20 108/62 (77) 97   





 97.4       


 


11/29/24 22:46      Nasal Cannula* 4 36








Intake/Output











                               Intake and Output 


 


 11/30/24





 07:00


 


Intake Total 1200 ml


 


Balance 1200 ml


 


 


 


Intake Oral 1150 ml


 


IV Total 50 ml


 


# Voids 5


 


# Bowel Movements 2








Medications





                               Current Medications








 Medications  Dose


 Ordered  Sig/Prabha


 Route  Start Time


 Stop Time Status Last Admin


Dose Admin


 


 Apixaban  5 mg  DAILY


 PO  11/29/24 10:00


    11/29/24 11:00


5 MG


 


 Carisoprodol  350 mg  BIDP


 PO  11/28/24 22:00


   Hold  





 


 Carvedilol  6.25 mg  BID


 PO  11/28/24 22:00


    11/29/24 21:31


6.25 MG


 


 Diazepam  10 mg  BID


 PO  11/28/24 22:00


    11/29/24 21:31


10 MG


 


 Acetaminophen/


 Hydrocodone Bitart  1 tab  Q6HP


 PO  11/28/24 18:00


   Hold  





 


 Potassium Chloride  10 meq  DAILY


 PO  11/29/24 10:00


    11/29/24 11:00


10 MEQ


 


 Atorvastatin


 Calcium  40 mg  QPM


 PO  11/28/24 18:00


    11/29/24 18:44


40 MG


 


 Furosemide  40 mg  BID


 IV  11/28/24 22:00


    11/29/24 10:58


40 MG


 


 Albuterol  2.5 mg  Q4HPRN  PRN


 NEB  11/28/24 18:00


    11/29/24 22:46


2.5 MG


 


 Ipratropium


 Bromide  0.5 mg  Q4HPRN  PRN


 NEB  11/28/24 18:00


    11/29/24 22:46


0.5 MG


 


 Methylprednisolone


 Sodium Succinate  40 mg  BID


 IV  11/28/24 22:00


    11/29/24 21:30


40 MG


 


 Ceftriaxone Sodium  50 ml @ 


 100 mls/hr  DAILY


 IV  11/28/24 18:00


    11/29/24 11:00


100 MLS/HR


 


 Azithromycin  500 mg  DAILY


 PO  11/28/24 18:00


    11/29/24 11:00


500 MG


 


 Lorazepam  0.5 mg  Q6HP  PRN


 PO  11/28/24 18:00


    11/30/24 04:47


0.5 MG


 


 Al Hydrox/Mg


 Hydrox/Simethicone  30 ml  Q6HP  PRN


 PO  11/28/24 18:00


     





 


 Docusate Sodium  100 mg  BIDPRN  PRN


 PO  11/28/24 18:00


     





 


 Acetaminophen  650 mg  Q6HP  PRN


 PO  11/28/24 18:00


    11/29/24 10:59


650 MG


 


 Temazepam  15 mg  QHSP  PRN


 PO  11/28/24 18:00


     





 


 Acetaminophen/


 Hydrocodone Bitart  1 tab  Q4HP  PRN


 PO  11/28/24 18:00


    11/29/24 05:32


1 TAB


 


 Ondansetron HCl  4 mg  Q4HP  PRN


 IV  11/28/24 18:00


    11/30/24 04:44


4 MG


 


 Morphine Sulfate  2 mg  Q4HPRN  PRN


 IV  11/28/24 18:00


    11/28/24 22:25


2 MG


 


 Losartan Potassium  50 mg  DAILY


 PO  11/30/24 10:00


     














Laboratory Results


Laboratory Tests


11/29/24 05:32











Urinalysis








Test


 11/28/24


16:00


 


Urine Color


 Light-yellow


(Yellow)


 


Urine Clarity Clear (Clear)  


 


Urine pH 6.5 (5.0-9.0)  


 


Urine Specific Gravity


 1.009


(1.001-1.035)


 


Urine Protein


 Negative


(Negative)


 


Urine Ketones


 Negative


(Negative)


 


Urine Blood


 Negative /uL


(Negative)


 


Urine Nitrite


 Negative


(Negative)


 


Urine Bilirubin


 Negative


(Negative)


 


Urine Urobilinogen


 Normal mg/dL


(Negative)


 


Urine Leukocyte Esterase


 Negative /uL


(Negative)


 


Urine RBC


 1 /hpf (0 - 4)





 


Urine WBC


 3 /hpf (0 - 5)





 


Urine WBC Clumps


 Present /hpf


(None Seen)


 


Urine Squamous Epithelial


Cells Few /hpf (<5)  





 


Urine Bacteria


 None seen /hpf


(None Seen)


 


Urine Mucus


 Few (None


Seen)


 


Urine Glucose


 Normal mg/dL


(Normal)








Microbiology





                                  Microbiology








 Date/Time


Source Procedure


Growth Status





 


 11/28/24 14:30


Blood Blood Culture - Preliminary


NO GROWTH AFTER 24 HOURS OF INCUBATION. Resulted








Labs and/or images reviewed:  Labs reviewed by me, Image(s) reviewed by me





Assessment/Plan


Assessment/Plan


Acute hypoxic respiratory failure:  6 L Oxygen by nasal cannula


Acute COPD exacerbation:  Albuterol med neb Atrovent med-nebs Solu-Medrol


Acute exacerbation of chronic congestive heart failure :  Lasix potassium

Coreg


History of AFib:  Eliquis 


History of coronary artery disease


Hypotension:  Losartan


History of MI


Hypercholesterolemia:  Lipitor


Bilateral community-acquired pneumonia:  Rocephin azithromycin


Time spent 50 minutes 


Patient is full code 


Advanced care planning time 20 minutes


Plan discussed with:  Patient


My Orders





                            Orders - TRAVIS KUMAR MD








Procedure Category Date Status





  Time 


 


Rapid Influenza A&B LAB 11/29/24 Logged





  12:11 


 


Covid19 Antigen Yessenia LAB 11/29/24 Logged





   


 


Losartan Tablet PHA 11/30/24 In Process





 (Cozaar Tablet)  10:00 


 


* Cardiology Consult CONS 11/29/24 Transmitted





  13:40 


 


Mrsa Screen ALISON 11/29/24 In Process





  20:57 











Date of Service:  Nov 30, 2024


Billing Provider:  TRAVIS KUMAR MD


Common Visit Codes:  [id number] INP/OBS CARE(HIGH)











TRAVIS KUMAR MD                Nov 30, 2024 08:44

## 2024-12-01 VITALS
SYSTOLIC BLOOD PRESSURE: 144 MMHG | TEMPERATURE: 98 F | OXYGEN SATURATION: 92 % | DIASTOLIC BLOOD PRESSURE: 82 MMHG | RESPIRATION RATE: 18 BRPM | HEART RATE: 76 BPM

## 2024-12-01 VITALS — OXYGEN SATURATION: 95 %

## 2024-12-01 VITALS
DIASTOLIC BLOOD PRESSURE: 57 MMHG | SYSTOLIC BLOOD PRESSURE: 118 MMHG | HEART RATE: 87 BPM | TEMPERATURE: 98.1 F | RESPIRATION RATE: 20 BRPM | OXYGEN SATURATION: 91 %

## 2024-12-01 VITALS
TEMPERATURE: 98.5 F | OXYGEN SATURATION: 94 % | HEART RATE: 79 BPM | RESPIRATION RATE: 18 BRPM | DIASTOLIC BLOOD PRESSURE: 81 MMHG | SYSTOLIC BLOOD PRESSURE: 143 MMHG

## 2024-12-01 VITALS — RESPIRATION RATE: 20 BRPM | HEART RATE: 82 BPM

## 2024-12-01 NOTE — DVHINCON2
Date of service:  Dec 1, 2024





History of Present Illness


63 yo F with congenital heart disease, single coronary artery and MV surgery at 

age 10 months, hx of ppm /icd admitted for sob.





Past Medical History


reviewed





Family History:  


Cancer


  G8 MOTHER, Onset:50's - 60


  G8 FATHER, Onset:60 years & older


Family history: Diabetes mellitus


  G8 FATHER, Onset:30's - 40


  G8 BROTHER, Onset:30's - 40





Allergies:  


Coded Allergies:  


     Penicillins (Verified  Allergy, Unknown, 9/12/18)


Home Meds


Active Scripts


Methylprednisolone (Medrol Dosepak) 4 Mg Missael, 4 MG PO UD, #21 TAB


   UAD


   Prov:TRAVIS KUMAR MD         12/1/24


Azithromycin (Azithromycin) 500 Mg Tab, 1 TAB PO DAILY, #10 TAB


   Prov:TRAVIS KUMAR MD         12/1/24


Reported Medications


Ondansetron HCl (Ondansetron) 4 Mg Tab, 4 MG PO, TAB


   11/29/24


Alprazolam (Xanax) 0.25 Mg Tb, 5 MG PO, TAB


   11/29/24


Zolpidem Tartrate (Zolpidem Tartrate) 5 Mg Tab, 1 TAB PO, #30 TAB 2 Refills


   11/29/24


Icosapent Ethyl (Icosapent Ethyl) 1 Gm Cap, 1 GM PO, CAP


   11/29/24


Aspirin (Aspir-Low) 81 Mg Tab, 81 MG PO for 30 Days, MG


   11/29/24


Cilostazol (Cilostazol) 100 Mg Tab, 50 MG PO for 30 Days, MG


   11/29/24


Vortioxetine Hydrobromide (Trintellix) 20 Mg Tab, 20 MG PO, TAB


   11/29/24


Magnesium Oxide (Mag-Ox) 400 Mg Tb, 400 MG PO, TAB


   11/29/24


Hydrocodone-Acetaminophen (Hydrocodone Bitartrate/AC  mg) 1 Tab Tab, 1 TAB

PO Q6HP, TAB


   11/7/22


Diazepam (VALIUM TABLET) 5 Mg Tb, 2 TAB PO BID, #90 TAB


   9/14/18


Losartan Potassium (Losartan Potassium) 25 Mg Tab, 20 MG PO DAILY for 30 Days, 

MG


   9/14/18


Potassium Chloride (POTASSIUM CHLORIDE CR) 10 Meq Tb, 1 TAB PO DAILY, #30 TAB 5 

Refills


   9/13/18


Furosemide (Lasix) 20 Mg Tb, 1 TAB PO BID, #90 TAB 1 Refill


   9/13/18


Apixaban Base (ELIQUIS) 5 Mg Tab, 5 MG OR DAILY, TAB


   9/13/18


Simvastatin (Simvastatin) 40 Mg Tab, 40 MG PO QPM, TAB


   4/20/17


Temazepam (Temazepam) 30 Mg Cap, 30 MG PO HS, CAP


   4/20/17


Carisoprodol (Soma) 350 Mg Tab, 350 MG PO BIDP


   3/4/13


Carvedilol (Carvedilol) 3.125 Mg Tab, 6.25 MG PO BID


   3/4/13


Current Medications





                               Current Medications








 Medications


  (Trade)  Dose


 Ordered  Sig/Prabha


 Route


 PRN Reason  Start Time


 Stop Time Status Last Admin


 


 Losartan Potassium


  (Cozaar Tablet)  50 mg  DAILY


 PO


   11/30/24 10:00


    11/30/24 10:16














Review of Systems


10 pt ros otherwise negative





Vital Signs





                                   Vital Signs








  Date Time  Temp Pulse Resp B/P (MAP) Pulse Ox O2 Delivery O2 Flow Rate FiO2


 


12/1/24 06:39     95 Nasal Cannula* 2 28


 


12/1/24 05:00 98.0 76 18 144/82 (102)    





 98.0       








Physical Exam


nad


s1 s2 rrr


ctab


soft nt/nd


no edema





Labs/Diagnostic Data





                                      Labs








Test


 11/29/24


05:32 11/28/24


16:00 11/28/24


14:30 11/28/24


13:06 Range/Units


 


 


White Blood Count


 4.8 #


 


 


 


 4.4-10.8


10^3/uL


 


Red Blood Count


 4.45 


 


 


 


 4.0-5.20


10^6/uL


 


Hemoglobin 10.7 L    12.2-16.2  g/dL


 


Hematocrit 33.1 L    36.0-46.0  %


 


Mean Corpuscular Volume 74.3 L    80.0-100.0  fL


 


Mean Corpuscular Hemoglobin 24.1 L    28.0-32.0  pg


 


Mean Corpuscular Hemoglobin


Concent 32.4 


 


 


 


 32.0-36.0  g/dL





 


Red Cell Distribution Width 16.1 H    11.8-14.3  %


 


Platelet Count


 208 


 


 


 


 140-450


10^3/uL


 


Mean Platelet Volume 7.0     6.9-10.8  fL


 


Neutrophils (%) (Auto) 81.7 H    37.0-80.0  %


 


Lymphocytes (%) (Auto) 10.9     10.0-50.0  %


 


Monocytes (%) (Auto) 7.3     0.0-12.0  %


 


Eosinophils (%) (Auto) 0.0     0.0-7.0  %


 


Basophils (%) (Auto) 0.1     0.0-2.0  %


 


Neutrophils # (Auto)


 4.0 


 


 


 


 1.6-8.6  10


^3/uL


 


Lymphocytes # (Auto)


 0.5 


 


 


 


 0.4-5.4  10


^3/uL


 


Monocytes # (Auto) 0.4     0-1.3  10 ^3/uL


 


Eosinophils # (Auto) 0     0-0.8  10 ^3/uL


 


Basophils # (Auto) 0     0-0.2  10 ^3/uL


 


Nucleated Red Blood Cells 0.1      %


 


Sodium Level 144     136-145  mmol/L


 


Potassium Level 4.0     3.5-5.1  mmol/L


 


Chloride Level 105       mmol/L


 


Carbon Dioxide Level 32 H    20-31  mmol/L


 


Anion Gap 7     5-15  


 


Blood Urea Nitrogen 16     9-23  mg/dL


 


Creatinine


 0.72 


 


 


 


 0.550-1.02


mg/dL


 


Glomerular Filtration Rate


Calc 94 


 


 


 


 >90  mL/min





 


BUN/Creatinine Ratio 22.2 H    10.0-20.0  


 


Serum Glucose 128 H      mg/dL


 


Calcium Level 9.2     8.7-10.4  mg/dL


 


Urine Color  Light-yellow    Yellow  


 


Urine Clarity  Clear    Clear  


 


Urine pH  6.5    5.0-9.0  


 


Urine Specific Gravity  1.009    1.001-1.035  


 


Urine Protein  Negative    Negative  


 


Urine Ketones  Negative    Negative  


 


Urine Blood  Negative    Negative  /uL


 


Urine Nitrite  Negative    Negative  


 


Urine Bilirubin  Negative    Negative  


 


Urine Urobilinogen  Normal    Negative  mg/dL


 


Urine Leukocyte Esterase  Negative    Negative  /uL


 


Urine RBC  1    0 - 4  /hpf


 


Urine WBC  3    0 - 5  /hpf


 


Urine WBC Clumps  Present    None Seen  /hpf


 


Urine Squamous Epithelial


Cells 


 Few 


 


 


 <5  /hpf





 


Urine Bacteria  None seen    None Seen  /hpf


 


Urine Mucus  Few    None Seen  


 


Urine Glucose  Normal    Normal  mg/dL


 


Lactic Acid Level   1.7   0.4-2.0  mmol/L


 


B-Type Natriuretic Peptide    430.29  0-100  pg/mL








                                  Microbiology








 Date/Time


Source Procedure


Growth Status





 


 11/29/24 20:57


Nose MRSA Screen - Final


 Complete


 


 11/28/24 14:30


Blood Blood Culture - Preliminary


NO GROWTH AFTER 48 HOURS OF INCUBATION. Resulted











Assessment


congenital heart disease


hx of PPM/ICD


hx of chf


sob


copd


Plan/Recommendation


bnp is mild


cont diuretics


pt had LHC in 2018 showing RCA patent otherwise LM occlusion


echo 2022 shows ef 35%


cont HF meds


pt has fu with her cards in 1 week


Plan discussed with:  Patient











LIVE ROTHMAN MD               Dec 1, 2024 08:55

## 2024-12-01 NOTE — DVHDS2
Discharge Summary


Date of Admission


Nov 28, 2024 at 18:24





Date of Discharge:


Dec 1, 2024





Admitting Diagnosis


Shortness of breaths





Wounds:


None





Labs/Diagnostic Data:





                               Laboratory Results








Test


 11/29/24


05:32 11/28/24


16:00 11/28/24


14:30 11/28/24


13:06


 


White Blood Count


 4.8 10^3/uL


(4.4-10.8) 


 


 





 


Red Blood Count


 4.45 10^6/uL


(4.0-5.20) 


 


 





 


Hemoglobin


 10.7 g/dL


(12.2-16.2) 


 


 





 


Hematocrit


 33.1 %


(36.0-46.0) 


 


 





 


Mean Corpuscular Volume


 74.3 fL


(80.0-100.0) 


 


 





 


Mean Corpuscular Hemoglobin


 24.1 pg


(28.0-32.0) 


 


 





 


Mean Corpuscular Hemoglobin


Concent 32.4 g/dL


(32.0-36.0) 


 


 





 


Red Cell Distribution Width


 16.1 %


(11.8-14.3) 


 


 





 


Platelet Count


 208 10^3/uL


(140-450) 


 


 





 


Mean Platelet Volume


 7.0 fL


(6.9-10.8) 


 


 





 


Neutrophils (%) (Auto)


 81.7 %


(37.0-80.0) 


 


 





 


Lymphocytes (%) (Auto)


 10.9 %


(10.0-50.0) 


 


 





 


Monocytes (%) (Auto)


 7.3 %


(0.0-12.0) 


 


 





 


Eosinophils (%) (Auto)


 0.0 %


(0.0-7.0) 


 


 





 


Basophils (%) (Auto)


 0.1 %


(0.0-2.0) 


 


 





 


Neutrophils # (Auto)


 4.0 10 ^3/uL


(1.6-8.6) 


 


 





 


Lymphocytes # (Auto)


 0.5 10 ^3/uL


(0.4-5.4) 


 


 





 


Monocytes # (Auto)


 0.4 10 ^3/uL


(0-1.3) 


 


 





 


Eosinophils # (Auto)


 0 10 ^3/uL


(0-0.8) 


 


 





 


Basophils # (Auto)


 0 10 ^3/uL


(0-0.2) 


 


 





 


Nucleated Red Blood Cells 0.1 %    


 


Sodium Level


 144 mmol/L


(136-145) 


 


 





 


Potassium Level


 4.0 mmol/L


(3.5-5.1) 


 


 





 


Chloride Level


 105 mmol/L


() 


 


 





 


Carbon Dioxide Level


 32 mmol/L


(20-31) 


 


 





 


Anion Gap 7 (5-15)    


 


Blood Urea Nitrogen


 16 mg/dL


(9-23) 


 


 





 


Creatinine


 0.72 mg/dL


(0.550-1.02) 


 


 





 


Glomerular Filtration Rate


Calc 94 mL/min


(>90) 


 


 





 


BUN/Creatinine Ratio


 22.2


(10.0-20.0) 


 


 





 


Serum Glucose


 128 mg/dL


() 


 


 





 


Calcium Level


 9.2 mg/dL


(8.7-10.4) 


 


 





 


Urine Color


 


 Light-yellow


(Yellow) 


 





 


Urine Clarity  Clear (Clear)   


 


Urine pH  6.5 (5.0-9.0)   


 


Urine Specific Gravity


 


 1.009


(1.001-1.035) 


 





 


Urine Protein


 


 Negative


(Negative) 


 





 


Urine Ketones


 


 Negative


(Negative) 


 





 


Urine Blood


 


 Negative /uL


(Negative) 


 





 


Urine Nitrite


 


 Negative


(Negative) 


 





 


Urine Bilirubin


 


 Negative


(Negative) 


 





 


Urine Urobilinogen


 


 Normal mg/dL


(Negative) 


 





 


Urine Leukocyte Esterase


 


 Negative /uL


(Negative) 


 





 


Urine RBC  1 /hpf (0 - 4)   


 


Urine WBC  3 /hpf (0 - 5)   


 


Urine WBC Clumps


 


 Present /hpf


(None Seen) 


 





 


Urine Squamous Epithelial


Cells 


 Few /hpf (<5) 


 


 





 


Urine Bacteria


 


 None seen /hpf


(None Seen) 


 





 


Urine Mucus


 


 Few (None


Seen) 


 





 


Urine Glucose


 


 Normal mg/dL


(Normal) 


 





 


Lactic Acid Level


 


 


 1.7 mmol/L


(0.4-2.0) 





 


B-Type Natriuretic Peptide


 


 


 


 430.29 pg/mL


(0-100)





                             Other Laboratory Tests


11/29/24 05:32











Brief Hx & Hospital Course:


64-year-old female with a history of COPD CHF AFib coronary artery disease 

hypertension MI hypercholesterolemia came in for shortness of breaths found to 

have community-acquired pneumonia treated with Rocephin azithromycin med neb 

treatment home medications continued for comorbid conditions patient feels 

better on 2 L of oxygen at the time of discharge which is her usual home oxygen 

requirement.  The patient feels better and wants to go home discharged home on 

azithromycin and Medrol Dosepak





Consults/Reason for consult


None





Operations or Procedures


None





Condition at Discharge:


Fair





Final Diagnosis/Problems List





Acute hypoxic respiratory failure:  6 L Oxygen by nasal cannula





Acute COPD exacerbation:  Albuterol med neb Atrovent med-nebs Solu-Medrol





Acute exacerbation of chronic congestive heart failure :  Lasix 


potassium Coreg





History of AFib:  Eliquis 





History of coronary artery disease





Hypertension:  Losartan





History of MI





Hypercholesterolemia:  Lipitor





Bilateral community-acquired pneumonia:  Rocephin azithromycin





Discharge Disposition:


Home





Discharge Instruct/Medications


Diet:  Cardiac 2g Na,low cholest


Activity:  Light activity


Follow Up/Referral:  


Resume all previous home medications





Follow up with the primary Dr


Medications:  


Azithromycin





Medrol Dosepak





Transmitted to [facility] pharmacy


35 (Time taken for discharge summary 35 minutes)


Discharge Statement:


"Patient was advised to return to the ER or call 911 if any headaches, 

dizziness, shortness of breath, chest pain, abdominal pain, bleeding, fevers, or

worsening of medical condition.





Patient was counseled about treatment plan, medications, possible side effects, 

patientverbalized understanding. All questions were answered to the best of my 

ability.





This discharge took greater then 30 minutes in planning, reviewing 

documentation, counseling the patient, and discussing with other team members."





ASSESSMENT


ASSESSMENT


Hospital Course


Improved


Assessment


Acute hypoxic respiratory failure:  6 L Oxygen by nasal cannula


Acute COPD exacerbation:  Albuterol med neb Atrovent med-nebs Solu-Medrol


Acute exacerbation of chronic congestive heart failure :  Lasix potassium

Coreg


History of AFib:  Eliquis 


History of coronary artery disease


Hypertension:  Losartan


History of MI


Hypercholesterolemia:  Lipitor


Bilateral community-acquired pneumonia:  Rocephin azithromycin





Date of Service:  Dec 1, 2024


Billing Provider:  TRAVIS KUMAR MD


Common Visit Codes:  81520-ZOJ/OBS DISCH DAY >30min











TRAVIS KUMAR MD                 Dec 1, 2024 08:21

## 2024-12-01 NOTE — DVHPN2
Reviewed:  Care Plan, H&P, Labs, Medications, Previous Orders, Radiology


Changes from previous H/P or p:  No Changes


Eyes:  No Pain, No Vision change, No Conjunctivae inflammation, No Eyelid 

inflammation, No Other, No Redness


ENT:  No Ear pain, No Ear discharge, No Nose pain, No Nose discharge, No Nose 

congestion, No Mouth pain, No Mouth swelling, No Throat pain, No Throat 

swelling, No Other


Cardiovascular:  Chest Pain, Palpitations; No Orthopnea, No Paroxysmal Noc. 

Dyspnea, No Edema, No Lt Headedness, No Other


Respiratory:  No Cough, No Dry; Shortness of breath; No SOB with excertion, No 

Wheezing, No Hemoptysis, No Pleuritic Pain, No Sputum, No Other


Gastrointestinal:  No Nausea, No Vomiting, No Abdominal Pain, No Diarrhea, No 

Constipation, No Melena, No Hematochezia, No Other


Genitourinary:  No Dysuria, No Frequency, No Incontinence, No Hematuria, No 

Retention, No Other


Musculoskeletal:  No other, No neck pain, No shoulder pain, No arm pain, No back

pain, No hand pain, No leg pain, No foot pain


Skin:  No Rash, No Lesions, No Jaundice, No Bruising, No Other





Objective


Vitals





Vital Signs








  Date Time  Temp Pulse Resp B/P (MAP) Pulse Ox O2 Delivery O2 Flow Rate FiO2


 


12/1/24 06:39     95 Nasal Cannula* 2 28


 


12/1/24 05:00 98.0 76 18 144/82 (102)    





 98.0       








Intake/Output











                               Intake and Output 


 


 12/1/24





 07:00


 


Intake Total 1650 ml


 


Output Total 2800 ml


 


Balance -1150 ml


 


 


 


Intake Oral 1650 ml


 


Output Urine Total 2800 ml


 


# Bowel Movements 1








Medications





                               Current Medications








 Medications  Dose


 Ordered  Sig/Prabha


 Route  Start Time


 Stop Time Status Last Admin


Dose Admin


 


 Apixaban  5 mg  DAILY


 PO  11/29/24 10:00


    11/30/24 10:00


5 MG


 


 Carisoprodol  350 mg  BIDP


 PO  11/28/24 22:00


   Hold  





 


 Carvedilol  6.25 mg  BID


 PO  11/28/24 22:00


    11/30/24 21:24


6.25 MG


 


 Diazepam  10 mg  BID


 PO  11/28/24 22:00


    11/30/24 10:01


10 MG


 


 Acetaminophen/


 Hydrocodone Bitart  1 tab  Q6HP


 PO  11/28/24 18:00


   Hold  





 


 Potassium Chloride  10 meq  DAILY


 PO  11/29/24 10:00


    11/30/24 10:02


10 MEQ


 


 Atorvastatin


 Calcium  40 mg  QPM


 PO  11/28/24 18:00


    11/30/24 17:44


40 MG


 


 Furosemide  40 mg  BID


 IV  11/28/24 22:00


    11/30/24 10:04


40 MG


 


 Albuterol  2.5 mg  Q4HPRN  PRN


 NEB  11/28/24 18:00


    11/29/24 22:46


2.5 MG


 


 Ipratropium


 Bromide  0.5 mg  Q4HPRN  PRN


 NEB  11/28/24 18:00


    11/29/24 22:46


0.5 MG


 


 Methylprednisolone


 Sodium Succinate  40 mg  BID


 IV  11/28/24 22:00


    11/30/24 21:24


40 MG


 


 Ceftriaxone Sodium  50 ml @ 


 100 mls/hr  DAILY


 IV  11/28/24 18:00


    11/30/24 10:03


100 MLS/HR


 


 Azithromycin  500 mg  DAILY


 PO  11/28/24 18:00


    11/30/24 10:01


500 MG


 


 Lorazepam  0.5 mg  Q6HP  PRN


 PO  11/28/24 18:00


    12/1/24 04:49


0.5 MG


 


 Al Hydrox/Mg


 Hydrox/Simethicone  30 ml  Q6HP  PRN


 PO  11/28/24 18:00


     





 


 Docusate Sodium  100 mg  BIDPRN  PRN


 PO  11/28/24 18:00


     





 


 Acetaminophen  650 mg  Q6HP  PRN


 PO  11/28/24 18:00


    11/29/24 10:59


650 MG


 


 Temazepam  15 mg  QHSP  PRN


 PO  11/28/24 18:00


     





 


 Acetaminophen/


 Hydrocodone Bitart  1 tab  Q4HP  PRN


 PO  11/28/24 18:00


    11/29/24 05:32


1 TAB


 


 Ondansetron HCl  4 mg  Q4HP  PRN


 IV  11/28/24 18:00


    12/1/24 02:19


4 MG


 


 Morphine Sulfate  2 mg  Q4HPRN  PRN


 IV  11/28/24 18:00


    11/28/24 22:25


2 MG


 


 Losartan Potassium  50 mg  DAILY


 PO  11/30/24 10:00


    11/30/24 10:16


50 MG











Laboratory Results


Laboratory Tests


11/29/24 05:32











Urinalysis








Test


 11/28/24


16:00


 


Urine Color


 Light-yellow


(Yellow)


 


Urine Clarity Clear (Clear)  


 


Urine pH 6.5 (5.0-9.0)  


 


Urine Specific Gravity


 1.009


(1.001-1.035)


 


Urine Protein


 Negative


(Negative)


 


Urine Ketones


 Negative


(Negative)


 


Urine Blood


 Negative /uL


(Negative)


 


Urine Nitrite


 Negative


(Negative)


 


Urine Bilirubin


 Negative


(Negative)


 


Urine Urobilinogen


 Normal mg/dL


(Negative)


 


Urine Leukocyte Esterase


 Negative /uL


(Negative)


 


Urine RBC


 1 /hpf (0 - 4)





 


Urine WBC


 3 /hpf (0 - 5)





 


Urine WBC Clumps


 Present /hpf


(None Seen)


 


Urine Squamous Epithelial


Cells Few /hpf (<5)  





 


Urine Bacteria


 None seen /hpf


(None Seen)


 


Urine Mucus


 Few (None


Seen)


 


Urine Glucose


 Normal mg/dL


(Normal)








Microbiology





                                  Microbiology








 Date/Time


Source Procedure


Growth Status





 


 11/29/24 20:57


Nose MRSA Screen - Final


 Complete


 


 11/28/24 14:30


Blood Blood Culture - Preliminary


NO GROWTH AFTER 48 HOURS OF INCUBATION. Resulted








Labs and/or images reviewed:  Labs reviewed by me, Image(s) reviewed by me





Assessment/Plan


Assessment/Plan


Acute hypoxic respiratory failure:  6 L Oxygen by nasal cannula


Acute COPD exacerbation:  Albuterol med neb Atrovent med-nebs Solu-Medrol


Acute exacerbation of chronic congestive heart failure :  Lasix potassium

Coreg


History of AFib:  Eliquis 


History of coronary artery disease


Hypertension:  Losartan


History of MI


Hypercholesterolemia:  Lipitor


Bilateral community-acquired pneumonia:  Rocephin azithromycin


Patient feels better and wants to go home


Plan discussed with:  Patient





Date of Service:  Dec 1, 2024


Billing Provider:  TRAVIS KUMAR MD


Common Visit Codes:  48426-[id number] INP/OBS CARE(HIGH)











TRAVIS KUMAR MD                 Dec 1, 2024 08:15

## 2025-01-01 ENCOUNTER — HOSPITAL ENCOUNTER (INPATIENT)
Dept: HOSPITAL 15 - ER | Age: 65
LOS: 6 days | Discharge: HOME | DRG: 140 | End: 2025-01-07
Attending: INTERNAL MEDICINE | Admitting: INTERNAL MEDICINE
Payer: MEDICAID

## 2025-01-01 VITALS
RESPIRATION RATE: 18 BRPM | OXYGEN SATURATION: 91 % | HEART RATE: 82 BPM | SYSTOLIC BLOOD PRESSURE: 102 MMHG | TEMPERATURE: 100.2 F | DIASTOLIC BLOOD PRESSURE: 54 MMHG

## 2025-01-01 VITALS — HEART RATE: 83 BPM

## 2025-01-01 VITALS — RESPIRATION RATE: 17 BRPM | HEART RATE: 86 BPM | OXYGEN SATURATION: 90 %

## 2025-01-01 VITALS — HEIGHT: 63.5 IN | WEIGHT: 293 LBS | BODY MASS INDEX: 51.27 KG/M2

## 2025-01-01 VITALS
SYSTOLIC BLOOD PRESSURE: 100 MMHG | RESPIRATION RATE: 20 BRPM | HEART RATE: 86 BPM | TEMPERATURE: 98 F | DIASTOLIC BLOOD PRESSURE: 63 MMHG | OXYGEN SATURATION: 94 %

## 2025-01-01 VITALS — HEART RATE: 82 BPM | OXYGEN SATURATION: 93 % | RESPIRATION RATE: 12 BRPM

## 2025-01-01 DIAGNOSIS — E87.6: ICD-10-CM

## 2025-01-01 DIAGNOSIS — J96.01: ICD-10-CM

## 2025-01-01 DIAGNOSIS — Z90.49: ICD-10-CM

## 2025-01-01 DIAGNOSIS — J15.9: ICD-10-CM

## 2025-01-01 DIAGNOSIS — I48.92: ICD-10-CM

## 2025-01-01 DIAGNOSIS — I07.1: ICD-10-CM

## 2025-01-01 DIAGNOSIS — Z87.891: ICD-10-CM

## 2025-01-01 DIAGNOSIS — Z99.81: ICD-10-CM

## 2025-01-01 DIAGNOSIS — J44.1: Primary | ICD-10-CM

## 2025-01-01 DIAGNOSIS — Z83.3: ICD-10-CM

## 2025-01-01 DIAGNOSIS — R65.10: ICD-10-CM

## 2025-01-01 DIAGNOSIS — G47.33: ICD-10-CM

## 2025-01-01 DIAGNOSIS — I73.9: ICD-10-CM

## 2025-01-01 DIAGNOSIS — I50.23: ICD-10-CM

## 2025-01-01 DIAGNOSIS — Z88.0: ICD-10-CM

## 2025-01-01 DIAGNOSIS — Z79.899: ICD-10-CM

## 2025-01-01 DIAGNOSIS — Z87.74: ICD-10-CM

## 2025-01-01 DIAGNOSIS — D50.9: ICD-10-CM

## 2025-01-01 DIAGNOSIS — J44.0: ICD-10-CM

## 2025-01-01 DIAGNOSIS — J15.69: ICD-10-CM

## 2025-01-01 DIAGNOSIS — Z95.810: ICD-10-CM

## 2025-01-01 DIAGNOSIS — E66.01: ICD-10-CM

## 2025-01-01 DIAGNOSIS — I25.10: ICD-10-CM

## 2025-01-01 DIAGNOSIS — I11.0: ICD-10-CM

## 2025-01-01 DIAGNOSIS — I25.2: ICD-10-CM

## 2025-01-01 DIAGNOSIS — J12.9: ICD-10-CM

## 2025-01-01 DIAGNOSIS — Z79.01: ICD-10-CM

## 2025-01-01 DIAGNOSIS — E78.5: ICD-10-CM

## 2025-01-01 DIAGNOSIS — Z20.822: ICD-10-CM

## 2025-01-01 DIAGNOSIS — I48.0: ICD-10-CM

## 2025-01-01 LAB
ALBUMIN SERPL-MCNC: 4.3 G/DL (ref 3.2–4.8)
ALP SERPL-CCNC: 66 U/L (ref 46–116)
ALT SERPL-CCNC: 15 U/L (ref 7–40)
ANION GAP SERPL CALCULATED.3IONS-SCNC: 7 MMOL/L (ref 5–15)
ANION GAP SERPL CALCULATED.3IONS-SCNC: 8 MMOL/L (ref 5–15)
BILIRUB SERPL-MCNC: 0.3 MG/DL (ref 0.2–1)
BUN SERPL-MCNC: 15 MG/DL (ref 9–23)
BUN SERPL-MCNC: 18 MG/DL (ref 9–23)
BUN/CREAT SERPL: 18.3 (ref 10–20)
BUN/CREAT SERPL: 18.4 (ref 10–20)
CALCIUM SERPL-MCNC: 9.1 MG/DL (ref 8.7–10.4)
CALCIUM SERPL-MCNC: 9.2 MG/DL (ref 8.7–10.4)
CHLORIDE SERPL-SCNC: 103 MMOL/L (ref 98–107)
CHLORIDE SERPL-SCNC: 103 MMOL/L (ref 98–107)
CO2 SERPL-SCNC: 27 MMOL/L (ref 20–31)
CO2 SERPL-SCNC: 30 MMOL/L (ref 20–31)
GLUCOSE SERPL-MCNC: 181 MG/DL (ref 74–106)
GLUCOSE SERPL-MCNC: 95 MG/DL (ref 74–106)
HCT VFR BLD AUTO: 37 % (ref 36–46)
HCT VFR BLD AUTO: 37 % (ref 36–46)
HGB BLD-MCNC: 11.6 G/DL (ref 12.2–16.2)
HGB BLD-MCNC: 11.8 G/DL (ref 12.2–16.2)
LACTATE PLASV-SCNC: 2.3 MMOL/L (ref 0.4–2)
LIPASE SERPL-CCNC: 51 U/L (ref 12–53)
MCH RBC QN AUTO: 22.8 PG (ref 28–32)
MCH RBC QN AUTO: 23.4 PG (ref 28–32)
MCV RBC AUTO: 72.9 FL (ref 80–100)
MCV RBC AUTO: 73.3 FL (ref 80–100)
NRBC BLD QL AUTO: 0.1 %
NRBC BLD QL AUTO: 0.1 %
POTASSIUM SERPL-SCNC: 3.8 MMOL/L (ref 3.5–5.1)
POTASSIUM SERPL-SCNC: 4 MMOL/L (ref 3.5–5.1)
PROT SERPL-MCNC: 6.9 G/DL (ref 5.7–8.2)
SARS-COV+SARS-COV-2 AG RESP QL IA.RAPID: NEGATIVE
SODIUM SERPL-SCNC: 138 MMOL/L (ref 136–145)
SODIUM SERPL-SCNC: 140 MMOL/L (ref 136–145)

## 2025-01-01 PROCEDURE — 81001 URINALYSIS AUTO W/SCOPE: CPT

## 2025-01-01 PROCEDURE — 85652 RBC SED RATE AUTOMATED: CPT

## 2025-01-01 PROCEDURE — 93306 TTE W/DOPPLER COMPLETE: CPT

## 2025-01-01 PROCEDURE — 80053 COMPREHEN METABOLIC PANEL: CPT

## 2025-01-01 PROCEDURE — 84484 ASSAY OF TROPONIN QUANT: CPT

## 2025-01-01 PROCEDURE — 87426 SARSCOV CORONAVIRUS AG IA: CPT

## 2025-01-01 PROCEDURE — 87070 CULTURE OTHR SPECIMN AEROBIC: CPT

## 2025-01-01 PROCEDURE — 87081 CULTURE SCREEN ONLY: CPT

## 2025-01-01 PROCEDURE — 87040 BLOOD CULTURE FOR BACTERIA: CPT

## 2025-01-01 PROCEDURE — 83735 ASSAY OF MAGNESIUM: CPT

## 2025-01-01 PROCEDURE — 36415 COLL VENOUS BLD VENIPUNCTURE: CPT

## 2025-01-01 PROCEDURE — 71045 X-RAY EXAM CHEST 1 VIEW: CPT

## 2025-01-01 PROCEDURE — 83690 ASSAY OF LIPASE: CPT

## 2025-01-01 PROCEDURE — 71250 CT THORAX DX C-: CPT

## 2025-01-01 PROCEDURE — 83605 ASSAY OF LACTIC ACID: CPT

## 2025-01-01 PROCEDURE — 85025 COMPLETE CBC W/AUTO DIFF WBC: CPT

## 2025-01-01 PROCEDURE — 82728 ASSAY OF FERRITIN: CPT

## 2025-01-01 PROCEDURE — 87804 INFLUENZA ASSAY W/OPTIC: CPT

## 2025-01-01 PROCEDURE — 93005 ELECTROCARDIOGRAM TRACING: CPT

## 2025-01-01 PROCEDURE — 83880 ASSAY OF NATRIURETIC PEPTIDE: CPT

## 2025-01-01 PROCEDURE — 86141 C-REACTIVE PROTEIN HS: CPT

## 2025-01-01 PROCEDURE — 80048 BASIC METABOLIC PNL TOTAL CA: CPT

## 2025-01-01 PROCEDURE — 83550 IRON BINDING TEST: CPT

## 2025-01-01 PROCEDURE — 87205 SMEAR GRAM STAIN: CPT

## 2025-01-01 PROCEDURE — 94660 CPAP INITIATION&MGMT: CPT

## 2025-01-01 PROCEDURE — 83540 ASSAY OF IRON: CPT

## 2025-01-01 PROCEDURE — 94640 AIRWAY INHALATION TREATMENT: CPT

## 2025-01-01 RX ADMIN — IPRATROPIUM BROMIDE ONE MG: 0.5 SOLUTION RESPIRATORY (INHALATION) at 14:49

## 2025-01-01 RX ADMIN — ALBUTEROL SULFATE ONE MG: 2.5 SOLUTION RESPIRATORY (INHALATION) at 14:49

## 2025-01-01 RX ADMIN — METHYLPREDNISOLONE SODIUM SUCCINATE ONE MG: 125 INJECTION, POWDER, FOR SOLUTION INTRAMUSCULAR; INTRAVENOUS at 14:56

## 2025-01-01 RX ADMIN — FUROSEMIDE ONE MG: 10 INJECTION, SOLUTION INTRAMUSCULAR; INTRAVENOUS at 19:00

## 2025-01-01 NOTE — ED.PDOC
SOB-HPI


HPI Comments


64 year old female BIBA presents to the ED with chief complaint of SOB. Patient 

reports that she has been on 5L of O2 via NC and Ciprofloxacin since being 

admitted to the hospital on Thanksgiving for pneumonia. Patient relays that on 

Christmas, she had spent time with family and her grandchildren were sick, who 

then passed it onto her after Christmas. Patient states she started to 

experiencing symptoms of cough, nasal and chest congestion, SOB, and pleuritic 

chest pain. EMS notes that they provided the patient a breathing treatment on 

route to the ED. Patient denies any N/V/D, abdominal pain, headache, dizziness, 

or fever.


Chief Complaint:  Shortness of Breath


Time Seen by MD:  14:19


Primary Care Provider:  UNKNOWN NAME


Reviewed notes:  Nurses Notes, Paramedic Notes, Medications, Allergies


Information Source:  Patient, Emergency Med Personnel


Mode of Arrival:  EMS


Severity:  Moderate


Timing:  Days


Duration:  Since onset


Context:  At Rest


PE Risk Factors:  None


History of:  COPD, CHF, Recent Antibiotic


Prehospital treatment:  Oxygen


Modifying Factors:  Nothing


Associated Signs and Symptoms:  Cough, Nasal Congestion, Chest Pain


Quality:  Pressure


Radiation:  No Radiation


Location:  Substernal


If cough with SOB:  Non-Productive





Past Medical History


PAST MEDICAL HISTORY:  AFIB, CHF, COPD, HTN, MI


Surgical History:  Appendectomy, Pacemaker


Surgical History (Other):  


Congenital valve abnormality surgery


GYN History:  No Pertinent GYN History





Family History


Family History:  No family hx of Cancer, No family hx of DM, No family hx of 

Stroke





Social History


Smoker:  Non-Smoker, Quit Greater Than 1 Year


Alcohol:  Denies ETOH Use


Drugs:  Denies Drug Use


Lives In:  Home





Constitutional:  denies: chills, diaphoresis, fatigue, fever, malaise, sweats, 

weakness, others


EENTM:  reports: nose congestion; denies: blurred vision, double vision, ear 

bleeding, ear discharge, ear drainage, ear pain, ear ringing, eye pain, eye 

redness, hearing loss, mouth pain, mouth swelling, nasal discharge, nose 

bleeding, nose pain, photophobia, tearing, throat pain, throat swelling, voice 

changes, others


Respiratory:  reports: cough, shortness of breath; denies: hemoptysis, 

orthopnea, SOB at rest, SOB with excertion, stridor, wheezing, others


Cardiovascular:  reports: chest pain; denies: dizzy spells, diaphoresis, Dyspnea

on exertion, edema, irregular heart beat, left arm pain, lightheadedness, 

palpitations, PND, syncope, others


Gastrointestinal:  denies: abdomen distended, abdominal pain, blood streaked 

bowels, constipated, diarrhea, dysphagia, difficulty swallowing, hematemesis, 

melena, nausea, poor appetite, poor fluid intake, rectal bleeding, rectal pain, 

vomiting, others


Genitourinary:  denies: abnormal vagina bleeding, burning, dyspareunia, dysuria,

flank pain, frequency, hematuria, incontinence, pain, pregnant, vagina dis

charge, urgency, others


Neurological:  denies: dizziness, fainting, headache, left sided numbness, left 

sided weakness, numbness, paresthesia, pre-existing deficit, right sided 

numbness, right sided weakness, seizure, speech problems, tingling, tremors, 

weakness, others


Musculoskeletal:  denies: back pain, gout, joint pain, joint swelling, muscle 

pain, muscle stiffness, neck pain, others


Integumetry:  denies: bruises, change in color, change in hair/nails, dryness, 

laceration, lesions, lumps, rash, wounds, others


Allergic/Immunocompromised:  denies: Difficulty Healing, Frequent Infections, 

Hives, Itching, others


Hematologic/Lymphatic:  denies: anemia, blood clots, easy bleeding, easy 

bruising, swollen glands, others


Endocrine:  denies: excessive hunger, excessive sweating, excessive thirst, 

excessive urination, flushing, intolerance to cold, intolerance to heat, 

unexplained weight gain, unexplained weight loss, others


Psychiatric:  denies: anxiety, bipolar disorder, depression, hopeless, panic 

disorder, schizophrenia, sleepless, suicidal, others


All Other Systems:  Reviewed and Negative





Physical Exam


General Appearance:  Mild Distress


HEENT:  PERRL/EOMI


Neck:  Full Range of Motion, Normal Inspection


Respiratory:  Crackles, No Accessory Muscle Use, No Respiratory Distress, 

Respiratory Distress (mild), Rhonchi


Cardiovascular:  No Edema, No JVD, Regular Rate/Rhythm


Breast Exam:  Deferred


Gastrointestinal:  Non Tender, Soft


Genitalia:  Deferred


Pelvic:  Deferred


Rectal:  Deferred


Extremities:  Normal inspection, Normal range of motion, Non-tender, No pedal 

edema


Neurologic:  Alert (Oriented x4), Normal Affect, Normal Mood, Other (Moves all 

extremities.  No gross focal deficit.)


Cerebellar Function:  NOT DONE


Reflexes:  NOT DONE


Skin:  Dry, Pallor, Warm


Lymphatic:  NOT DONE





EKG


EKG :  


Comments


Atrial paced rhythm, rate 85, normal WI interval, , normal QTC interval, 

normal axis, old inferior or lateral infarct, inferior ST depression with T-wave

inversion and other nonspecific T change.





Was a procedure done?


Was a procedure done?:  No





Differential Dx


Differential Diagnosis:  Asthma, Bronchitis, CHF, COPD, Dysrhythmia, Myocardial 

infarction, Pneumonia, Pulmonary Embolism, URI





X-Ray, Labs, Meds, VS





                                   Vital Signs








  Date Time  Temp Pulse Resp B/P (MAP) Pulse Ox O2 Delivery O2 Flow Rate FiO2


 


1/1/25 16:00  80      


 


1/1/25 14:49   18  94 Nasal Cannula* 3 32


 


1/1/25 13:20 100.7 89 22 131/80 (97) 94   


 


1/1/25 13:11  85      








                                       Lab








Test


 1/1/25


15:31 1/1/25


15:29 1/1/25


14:33 Range/Units


 


 


Troponin I High Sensitivity 15   12  </=34  ng/L


 


Influenza Type A Antigen Negative    Negative  


 


Influenza Type B Antigen Negative    Negative  


 


SARS-CoV-2 Antigen (Rapid) Negative    NEGATIVE  


 


Urine Color  Straw   Yellow  


 


Urine Clarity  Clear   Clear  


 


Urine pH  5.5   5.0-9.0  


 


Urine Specific Gravity  1.009   1.001-1.035  


 


Urine Protein  Negative   Negative  


 


Urine Ketones  Negative   Negative  


 


Urine Blood  Negative   Negative  /uL


 


Urine Nitrite  Negative   Negative  


 


Urine Bilirubin  Negative   Negative  


 


Urine Urobilinogen  Normal   Negative  mg/dL


 


Urine Leukocyte Esterase  Negative   Negative  /uL


 


Urine RBC  None seen   0 - 4  /hpf


 


Urine WBC  <1   0 - 5  /hpf


 


Urine Squamous Epithelial


Cells 


 Few 


 


 <5  /hpf





 


Urine Bacteria  None seen   None Seen  /hpf


 


Urine Hyaline Casts  Few   0 - 2  /lpf


 


Urine Glucose  Normal   Normal  mg/dL


 


White Blood Count


 


 


 5.2 


 4.4-10.8


10^3/uL


 


Red Blood Count


 


 


 5.04 


 4.0-5.20


10^6/uL


 


Hemoglobin   11.8 L 12.2-16.2  g/dL


 


Hematocrit   37.0  36.0-46.0  %


 


Mean Corpuscular Volume   73.3 L 80.0-100.0  fL


 


Mean Corpuscular Hemoglobin   23.4 L 28.0-32.0  pg


 


Mean Corpuscular Hemoglobin


Concent 


 


 31.9 L


 32.0-36.0  g/dL





 


Red Cell Distribution Width   17.1 H 11.8-14.3  %


 


Platelet Count


 


 


 211 


 140-450


10^3/uL


 


Mean Platelet Volume   6.8 L 6.9-10.8  fL


 


Neutrophils (%) (Auto)   81.2 H 37.0-80.0  %


 


Lymphocytes (%) (Auto)   8.8 L 10.0-50.0  %


 


Monocytes (%) (Auto)   9.3  0.0-12.0  %


 


Eosinophils (%) (Auto)   0.4  0.0-7.0  %


 


Basophils (%) (Auto)   0.3  0.0-2.0  %


 


Neutrophils # (Auto)


 


 


 4.2 


 1.6-8.6  10


^3/uL


 


Lymphocytes # (Auto)


 


 


 0.5 


 0.4-5.4  10


^3/uL


 


Monocytes # (Auto)   0.5  0-1.3  10 ^3/uL


 


Eosinophils # (Auto)   0  0-0.8  10 ^3/uL


 


Basophils # (Auto)   0  0-0.2  10 ^3/uL


 


Nucleated Red Blood Cells   0.1   %


 


Sodium Level   140  136-145  mmol/L


 


Potassium Level   4.0  3.5-5.1  mmol/L


 


Chloride Level   103    mmol/L


 


Carbon Dioxide Level   30  20-31  mmol/L


 


Anion Gap   7  5-15  


 


Blood Urea Nitrogen   15  9-23  mg/dL


 


Creatinine


 


 


 0.82 


 0.550-1.02


mg/dL


 


Glomerular Filtration Rate


Calc 


 


 81 


 >90  mL/min





 


BUN/Creatinine Ratio   18.3  10.0-20.0  


 


Serum Glucose   95    mg/dL


 


Lactic Acid Level   2.3 *H 0.4-2.0  mmol/L


 


Calcium Level   9.2  8.7-10.4  mg/dL


 


B-Type Natriuretic Peptide   188.82  0-100  pg/mL








                               Current Medications








 Medications


  (Trade)  Dose


 Ordered  Sig/Prabha


 Route  Start Time


 Stop Time Status Last Admin


 


 Albuterol


  (Ventolin Medneb)  5 mg  ONCE  ONCE


 NEB  1/1/25 14:30


 1/1/25 14:31 DC 1/1/25 14:49





 


 Ipratropium


 Bromide


  (Atrovent Medneb)  0.5 mg  ONCE  ONCE


 NEB  1/1/25 14:30


 1/1/25 14:31 DC 1/1/25 14:49





 


 Methylprednisolone


 Sodium Succinate


  (Solu Medrol)  125 mg  ONCE  ONCE


 IV  1/1/25 14:30


 1/1/25 14:31 DC 1/1/25 14:56





 


 Levofloxacin/


 Dextrose  150 ml @ 


 100 mls/hr  ONCE  ONCE


 IV  1/1/25 14:30


 1/1/25 15:59 DC 1/1/25 14:57








PROCEDURE(s): CXRP - CHEST PORTABLE


REASON: sob


ORDER NUMBER(s): [id number], ACCESSION NUMBER(s): [id number]








CHEST RADIOGRAPH





Indication: sob





Technique: Single frontal view of the chest was obtained





COMPARISON: XY CHEST PORTABLE on DOS: 11/28/24





FINDINGS: 





Lines and Tubes: None





Lungs: Persistent diffuse interstitial prominence with partial obscuration of 

left hemidiaphragm.





Pleura: No effusion.





No pneumothorax. 





Cardiomediastinal contours: Moderate stable cardiomegaly. Pacemaker wires appear

intact.





Slight improvement the aeration of the lung fields since the previous study.





Bones: Unremarkable





IMPRESSION: 





1. Moderate cardiomegaly, stable





2. Findings compatible with CHF pattern, slightly improved since the prior study







 





Electronically Signed by: Alfredo Olson at 01/01/2025 14:22:43 PM


X-Ray, Labs, Meds, VS Comment


64-year-old female with a history of CAD, CHF, COPD, pacemaker and recent 

pneumonia complaining of shortness a breath and chest pain 


Vitals remarkable for temperature 100.7, oxygen saturation 94% on 5 L nasal 

cannula


EKG atrial paced rhythm, inferior ST depression with other nonspecific T changes




Chest x-ray consistent with CHF


Labs remarkable for lactate 2.3, .82, troponins negative, influenza and 

COVID tests negative 


Patient treated with the following in the ED:


Albuterol 5 mg/Atrovent 0.5 mg nebulized, Solu-Medrol 125 mg IV, Levaquin 750 mg

IV, Lasix 40 mg IV 


On re-evaluation, patient states shortness breath has improved.  Oxygen 

saturation is still 94% on 5 L nasal cannula.


Plan is to admit the patient for lactate trend, IV antibiotics, diuresis and 

respiratory support as needed


Time of 1ST Reevaluation:  15:19


Reevaluation 1ST:  Unchanged


Patient Education/Counseling:  Diagnosis, Treatment


Family Education/Counseling:  No Family Present





Departure 1


Departure


Time of Disposition:  17:04


Impression:  


   Primary Impression:  


   Bilateral pneumonia


   Qualified Codes:  J18.9 - Pneumonia, unspecified organism


   Additional Impressions:  


   Acute exacerbation of CHF (congestive heart failure)


   Qualified Codes:  I50.9 - Heart failure, unspecified


   COPD exacerbation


Disposition:  09 ADMITTED AS INPATIENT


Admit to:  Tele


Condition:  Guarded





Critical Care Note


Critical Care Time?:  No





Stability


Stability form required:  No





Heart Score


Heart Score:  








Heart Score Response (Comments) Value


 


History N/A 0


 


EKG N/A 0


 


Age N/A 0


 


Risk Factors N/A 0


 


Troponin N/A 0


 


Total  0














I personally scribed for ISIDORO PACHECO MD ([email or web address]) on 1/1/25 at 

14:24.  Electronically submitted by Phillip Gregorio ([email or web address]).





ISIDORO PACHECO MD     Jan 1, 2025 14:24

## 2025-01-01 NOTE — DVHHPRES
History of Present Illness


Resident Creating Document:  ANDRES MORELGEORGE RESIDENT


History of Present Illness


Patient is a 64-year-old female with a past medical history of heart failure 

with reduced ejection fraction, atrial fibrillation, COPD, coronary artery 

disease , congenital heart disease s/p surgery @ 10 months of age came to the ED

with a chief complaint of worsening shortness of breath for about a week.  

Patient was admitted to the hospital 1 month ago for a similar complaint of 

shortness of breath was discharged on oral antibiotic azithromycin.  Patient 

reported she was feeling well since about a week ago when she went to see her 

daughter where her grand kids were sick with flu-like symptoms following which 

she also developed cold, congestion, dry cough, sore throat, fever and headache 

and subsequently started to feel short of breath.  Patient reports to be using 

oxygen at home for the last 7 years usually at night with the CPAP machine as 

she reports severe sleep apnea but on and off in the day at 2 liters/minute via 

nasal cannula. patient also reported associated chest pain on the left side, 

pressure-like, which radiated to the neck and jaw, with the associated 

palpitations and sweating.  Patient denied nausea, vomiting, abdominal pain or 

diarrhea. 





Past medical history:  Heart failure with reduced ejection fraction, atrial 

fibrillation, coronary artery disease,


Past surgical history:  Pacemaker/AICD, congenital heart disease s/p surgery @ 

10 months


Social history:  Patient lives in a mobile home alone.  Previous smoker half a 

pack a day for about 20 years, history of methamphetamine use but quit 7 years 

ago, history of alcohol use but she quit 2-3 years ago


Home medications:  Eliquis 5 mg b.i.d., Trintellix 20 mg, sotalol 40 mg b.i.d., 

carvedilol 6.25 mg b.i.d., latuda 40 mg q.d., furosemide 20 mg q.d., aspirin 81 

mg q.d., losartan 25 mg q.d., Norco 10 Q 6 hours





Review of Systems


Review of Systems


Patient reports breathing better while on oxygen at 4 liter/minute


Reports moderate-to-severe headache, generalized, constant and it has been going

on the last few hours.


Denies palpitations, chest pain.


Has intermittent bouts of dry cough


Allergies:  


Coded Allergies:  


     Penicillins (Verified  Allergy, Unknown, 9/12/18)





Exam


Vital Signs





Vital Signs








  Date Time  Temp Pulse Resp B/P (MAP) Pulse Ox O2 Delivery O2 Flow Rate FiO2


 


1/1/25 19:30  82 12 102/54 (70) 93   


 


1/1/25 14:49      Nasal Cannula* 3 32


 


1/1/25 13:45 100.2       





 100.2       








Exam


Physical Examination


Constitutional:  Patient was alert and oriented to time, place and person 

appears to be in mild distress because of the headache and chronic back pain.


Gen - no pallor, no icterus, no cyanosis, no clubbing, no LAD, no edema . 


Skin - Patients skin is warm and dry.. 


HEENT - normocephalic, atraumatic, dry mucous membranes.


Neck - full ROM, no LAD, jugular venous distention disposition seen in the upper

3rd of the SCM


Pulmonary - breath sounds diminished in the left lower side as bear to the right

with coarse inspiratory crackles of the left side.


cardiovascular - normal S1,S2 heard. no murmurs heard. peripheral pulses radial 

2+, pedal 2+. 


GI - soft abdomen without tenderness to palpation . no hepatospleenomegaly.  

Bowel sounds normoactive


Neurological -  Bilateral upper extremity strength 5/5, bilateral lower 

extremity strength 5/5, no facial droop, normal speech, no tremor, no sensory 

deficiets.





Labs/Xrays





                                      Labs








Test


 1/1/25


17:30 1/1/25


17:13 1/1/25


15:31 1/1/25


15:29 Range/Units


 


 


Troponin I High Sensitivity 16     </=34  ng/L


 


Lactic Acid Level  0.9    0.4-2.0  mmol/L


 


Influenza Type A Antigen   Negative   Negative  


 


Influenza Type B Antigen   Negative   Negative  


 


SARS-CoV-2 Antigen (Rapid)   Negative   NEGATIVE  


 


Urine Color    Straw  Yellow  


 


Urine Clarity    Clear  Clear  


 


Urine pH    5.5  5.0-9.0  


 


Urine Specific Gravity    1.009  1.001-1.035  


 


Urine Protein    Negative  Negative  


 


Urine Ketones    Negative  Negative  


 


Urine Blood    Negative  Negative  /uL


 


Urine Nitrite    Negative  Negative  


 


Urine Bilirubin    Negative  Negative  


 


Urine Urobilinogen    Normal  Negative  mg/dL


 


Urine Leukocyte Esterase    Negative  Negative  /uL


 


Urine RBC    None seen  0 - 4  /hpf


 


Urine WBC    <1  0 - 5  /hpf


 


Urine Squamous Epithelial


Cells 


 


 


 Few 


 <5  /hpf





 


Urine Bacteria    None seen  None Seen  /hpf


 


Urine Hyaline Casts    Few  0 - 2  /lpf


 


Urine Glucose    Normal  Normal  mg/dL


 


Test


 1/1/25


14:33 


 


 


 Range/Units


 


 


White Blood Count


 5.2 


 


 


 


 4.4-10.8


10^3/uL


 


Red Blood Count


 5.04 


 


 


 


 4.0-5.20


10^6/uL


 


Hemoglobin 11.8 L    12.2-16.2  g/dL


 


Hematocrit 37.0     36.0-46.0  %


 


Mean Corpuscular Volume 73.3 L    80.0-100.0  fL


 


Mean Corpuscular Hemoglobin 23.4 L    28.0-32.0  pg


 


Mean Corpuscular Hemoglobin


Concent 31.9 L


 


 


 


 32.0-36.0  g/dL





 


Red Cell Distribution Width 17.1 H    11.8-14.3  %


 


Platelet Count


 211 


 


 


 


 140-450


10^3/uL


 


Mean Platelet Volume 6.8 L    6.9-10.8  fL


 


Neutrophils (%) (Auto) 81.2 H    37.0-80.0  %


 


Lymphocytes (%) (Auto) 8.8 L    10.0-50.0  %


 


Monocytes (%) (Auto) 9.3     0.0-12.0  %


 


Eosinophils (%) (Auto) 0.4     0.0-7.0  %


 


Basophils (%) (Auto) 0.3     0.0-2.0  %


 


Neutrophils # (Auto)


 4.2 


 


 


 


 1.6-8.6  10


^3/uL


 


Lymphocytes # (Auto)


 0.5 


 


 


 


 0.4-5.4  10


^3/uL


 


Monocytes # (Auto) 0.5     0-1.3  10 ^3/uL


 


Eosinophils # (Auto) 0     0-0.8  10 ^3/uL


 


Basophils # (Auto) 0     0-0.2  10 ^3/uL


 


Nucleated Red Blood Cells 0.1      %


 


Sodium Level 140     136-145  mmol/L


 


Potassium Level 4.0     3.5-5.1  mmol/L


 


Chloride Level 103       mmol/L


 


Carbon Dioxide Level 30     20-31  mmol/L


 


Anion Gap 7     5-15  


 


Blood Urea Nitrogen 15     9-23  mg/dL


 


Creatinine


 0.82 


 


 


 


 0.550-1.02


mg/dL


 


Glomerular Filtration Rate


Calc 81 


 


 


 


 >90  mL/min





 


BUN/Creatinine Ratio 18.3     10.0-20.0  


 


Serum Glucose 95       mg/dL


 


Calcium Level 9.2     8.7-10.4  mg/dL


 


B-Type Natriuretic Peptide 188.82     0-100  pg/mL











Assessment/Plan


Assessment/Plan





# acute hypoxic respiratory failure secondary to ? heart failure exacerbation vs

? Pneumonia


# acute exacerbation heart failure with reduced ejection fraction 


# complicated post viral pneumonia likely due to Gram+/-versus atypical bacteria


# SIRS positive


-  on 4-5 L oxygen via nasal cannula


- chest x-ray shows bilateral opacities with the obstruction of the left CP 

angle, increased pulmonary vascular marking, interstitial pulmonary edema


- elevated CRP


- 1 dose methylprednisolone 125 mg IV given in the ER


- patient given furosemide 40 mg IV


- started on 40 mg IV furosemide b.i.d.


- on levofloxacin 750 mg q.d. IV


- at home patient takes 6.25 mg b.i.d. carvedilol, currently held given soft 

blood pressures


- echo pending, previous echo in 2022 shows 30-35% EF, concentric LVH, dilated 

LV, diffuse hypokinesis of left ventricle





# paroxysmal atrial fibrillation


- currently heart rate is controlled


- at home sotalol 40 mg b.i.d., currently held given soft blood pressure but can

resume if the heart rate is uncontrolled


- Eliquis 5 mg b.i.d.





# acute chest pain, rule out ACS


- 12 lead EKG shows atrial paced rhythms with ST depression in leads 2 3 AVF and

mild ST elevation in aVL


- troponins are under normal limits


- on aspirin 81 mg and atorvastatin 40 mg


- cardiology consulted





# obstructive sleep apnea


- BiPAP at night





# hypokalemia


- adequately replaced


- on potassium 10 mEq daily





# microcytic hypochromic anemia


- iron panel, ferritin pending


-monitor H&H





PUD prophylaxis:  Famotidine 20 mg IV


DVT prophylaxis:  Enoxaparin 40 mg sc q.d.





Goals of care discussed with the patient for over 29 minutes.  Full code 





Plan discussed with 





Plan discussed with:  Patient


My Orders





                        Orders - NARESH MOREL RESIDENT








Procedure Category Date Status





  Time 


 


Admit ADMIT 1/1/25 Verified





  21:47 


 


Nitroglycerin PHA 1/1/25 Verified





Sublingual (Ntrostat  22:00 


 


Morphine Sulfate PHA 1/1/25 Verified





Injection  22:00 


 


Oxygen By Nasal RT 1/1/25 Verified





Cannula  21:47 


 


Stat Ekg For Chest JOIE 1/1/25 Verified





Pain  21:47 


 


Notify Md Of Changes JOIE 1/1/25 Verified





From Base  21:47 


 


Telemetry Monitor For JOIE 1/1/25 Verified





24 Hours  21:47 











Date of Service:  Jan 1, 2025


Billing Provider:  VIKKI CACERES MD


Common Visit Codes:  99223-INITIAL  INP/OBS CARE (HIGH)


Secondary Visit Codes:  99497-ADVANCED CARE PLAN 30 MINUTES











NARESH MOREL RESIDENT        Jan 1, 2025 21:50


VIKKI CACERES MD             Jan 2, 2025 16:53

## 2025-01-01 NOTE — DVH
CHEST RADIOGRAPH



Indication: sob



Technique: Single frontal view of the chest was obtained



COMPARISON: XY CHEST PORTABLE on DOS: 11/28/24



FINDINGS: 



Lines and Tubes: None



Lungs: Persistent diffuse interstitial prominence with partial obscuration of left hemidiaphragm.



Pleura: No effusion.



No pneumothorax. 



Cardiomediastinal contours: Moderate stable cardiomegaly. Pacemaker wires appear intact.



Slight improvement the aeration of the lung fields since the previous study.



Bones: Unremarkable



IMPRESSION: 



1. Moderate cardiomegaly, stable



2. Findings compatible with CHF pattern, slightly improved since the prior study 



 



Electronically Signed by: Alfredo Olson at 01/01/2025 14:22:43 PM

## 2025-01-01 NOTE — ECG
[facility]

                                       

Test Date:    2025               Test Time:    13:11:32

Pat Name:     THOMAS AVILA         Department:   er

Patient ID:   [id number]           Room:         0292T

Gender:       F                        Technician:   lawrence

:          [id number]               Requested By: ISIDORO PACK

Order Number: [id number]        Reading MD:   Joshua Siu

                                 Measurements

Intervals                              Axis          

Rate:         85                       P:            0

NV:           142                      QRS:          122

QRSD:         156                      T:            -60

QT:           363                                    

QTc:          432                                    

                           Interpretive Statements

Atrial-paced complexes

Nonspecific intraventricular conduction delay

Lateral infarct, age indeterminate

Anteroseptal infarct, old

Baseline wander in lead(s) V2

Electronically Signed On 2025 8:55:11 PST by Joshua Siu



Please click the below link to view image of tracing.

## 2025-01-02 VITALS — OXYGEN SATURATION: 94 % | RESPIRATION RATE: 20 BRPM | HEART RATE: 98 BPM

## 2025-01-02 VITALS — OXYGEN SATURATION: 91 % | HEART RATE: 79 BPM

## 2025-01-02 VITALS
SYSTOLIC BLOOD PRESSURE: 115 MMHG | TEMPERATURE: 98.1 F | HEART RATE: 104 BPM | OXYGEN SATURATION: 96 % | RESPIRATION RATE: 20 BRPM | DIASTOLIC BLOOD PRESSURE: 72 MMHG

## 2025-01-02 VITALS
OXYGEN SATURATION: 92 % | HEART RATE: 101 BPM | DIASTOLIC BLOOD PRESSURE: 47 MMHG | TEMPERATURE: 97.5 F | RESPIRATION RATE: 17 BRPM | SYSTOLIC BLOOD PRESSURE: 105 MMHG

## 2025-01-02 VITALS
RESPIRATION RATE: 16 BRPM | DIASTOLIC BLOOD PRESSURE: 63 MMHG | TEMPERATURE: 97.9 F | OXYGEN SATURATION: 92 % | SYSTOLIC BLOOD PRESSURE: 90 MMHG | HEART RATE: 106 BPM

## 2025-01-02 VITALS — OXYGEN SATURATION: 95 % | HEART RATE: 117 BPM | RESPIRATION RATE: 20 BRPM

## 2025-01-02 VITALS
OXYGEN SATURATION: 94 % | DIASTOLIC BLOOD PRESSURE: 63 MMHG | TEMPERATURE: 97.8 F | RESPIRATION RATE: 17 BRPM | SYSTOLIC BLOOD PRESSURE: 100 MMHG | HEART RATE: 89 BPM

## 2025-01-02 VITALS
HEART RATE: 86 BPM | RESPIRATION RATE: 20 BRPM | DIASTOLIC BLOOD PRESSURE: 58 MMHG | TEMPERATURE: 98 F | OXYGEN SATURATION: 94 % | SYSTOLIC BLOOD PRESSURE: 111 MMHG

## 2025-01-02 VITALS — HEART RATE: 83 BPM | OXYGEN SATURATION: 94 % | RESPIRATION RATE: 18 BRPM

## 2025-01-02 VITALS — OXYGEN SATURATION: 98 % | RESPIRATION RATE: 18 BRPM | HEART RATE: 90 BPM

## 2025-01-02 VITALS — OXYGEN SATURATION: 99 % | RESPIRATION RATE: 20 BRPM | HEART RATE: 112 BPM

## 2025-01-02 VITALS
HEART RATE: 119 BPM | SYSTOLIC BLOOD PRESSURE: 114 MMHG | TEMPERATURE: 98.7 F | OXYGEN SATURATION: 100 % | RESPIRATION RATE: 20 BRPM | DIASTOLIC BLOOD PRESSURE: 67 MMHG

## 2025-01-02 VITALS — RESPIRATION RATE: 18 BRPM | OXYGEN SATURATION: 98 % | HEART RATE: 86 BPM

## 2025-01-02 VITALS — OXYGEN SATURATION: 100 % | HEART RATE: 124 BPM

## 2025-01-02 VITALS — OXYGEN SATURATION: 97 % | RESPIRATION RATE: 27 BRPM | HEART RATE: 82 BPM

## 2025-01-02 VITALS — HEART RATE: 145 BPM

## 2025-01-02 VITALS — OXYGEN SATURATION: 97 % | HEART RATE: 74 BPM | RESPIRATION RATE: 27 BRPM

## 2025-01-02 VITALS — HEART RATE: 98 BPM | OXYGEN SATURATION: 95 %

## 2025-01-02 VITALS — HEART RATE: 100 BPM

## 2025-01-02 LAB
ALBUMIN SERPL-MCNC: 4.1 G/DL (ref 3.2–4.8)
ALP SERPL-CCNC: 63 U/L (ref 46–116)
ALT SERPL-CCNC: 11 U/L (ref 7–40)
ANION GAP SERPL CALCULATED.3IONS-SCNC: 5 MMOL/L (ref 5–15)
ANION GAP SERPL CALCULATED.3IONS-SCNC: 9 MMOL/L (ref 5–15)
BILIRUB SERPL-MCNC: 0.2 MG/DL (ref 0.2–1)
BUN SERPL-MCNC: 19 MG/DL (ref 9–23)
BUN SERPL-MCNC: 22 MG/DL (ref 9–23)
BUN/CREAT SERPL: 20.4 (ref 10–20)
BUN/CREAT SERPL: 21.1 (ref 10–20)
CALCIUM SERPL-MCNC: 9.4 MG/DL (ref 8.7–10.4)
CALCIUM SERPL-MCNC: 9.4 MG/DL (ref 8.7–10.4)
CHLORIDE SERPL-SCNC: 104 MMOL/L (ref 98–107)
CHLORIDE SERPL-SCNC: 106 MMOL/L (ref 98–107)
CO2 SERPL-SCNC: 28 MMOL/L (ref 20–31)
CO2 SERPL-SCNC: 29 MMOL/L (ref 20–31)
GLUCOSE SERPL-MCNC: 115 MG/DL (ref 74–106)
GLUCOSE SERPL-MCNC: 123 MG/DL (ref 74–106)
HCT VFR BLD AUTO: 35.9 % (ref 36–46)
HCT VFR BLD AUTO: 36.7 % (ref 36–46)
HGB BLD-MCNC: 11.5 G/DL (ref 12.2–16.2)
HGB BLD-MCNC: 11.8 G/DL (ref 12.2–16.2)
IRON SERPL-MCNC: 19 UG/DL (ref 50–170)
MCH RBC QN AUTO: 23.8 PG (ref 28–32)
MCH RBC QN AUTO: 23.8 PG (ref 28–32)
MCV RBC AUTO: 74.1 FL (ref 80–100)
MCV RBC AUTO: 74.3 FL (ref 80–100)
NRBC BLD QL AUTO: 0 %
NRBC BLD QL AUTO: 0.2 %
POTASSIUM SERPL-SCNC: 3.6 MMOL/L (ref 3.5–5.1)
POTASSIUM SERPL-SCNC: 3.6 MMOL/L (ref 3.5–5.1)
PROT SERPL-MCNC: 6.4 G/DL (ref 5.7–8.2)
SODIUM SERPL-SCNC: 139 MMOL/L (ref 136–145)
SODIUM SERPL-SCNC: 142 MMOL/L (ref 136–145)
TIBC SERPL-MCNC: 335 UG/DL (ref 250–425)

## 2025-01-02 PROCEDURE — 5A09357 ASSISTANCE WITH RESPIRATORY VENTILATION, LESS THAN 24 CONSECUTIVE HOURS, CONTINUOUS POSITIVE AIRWAY PRESSURE: ICD-10-PCS | Performed by: INTERNAL MEDICINE

## 2025-01-02 RX ADMIN — FUROSEMIDE ONE MG: 10 INJECTION, SOLUTION INTRAMUSCULAR; INTRAVENOUS at 16:13

## 2025-01-02 RX ADMIN — FUROSEMIDE SCH MG: 10 INJECTION, SOLUTION INTRAMUSCULAR; INTRAVENOUS at 05:31

## 2025-01-02 RX ADMIN — IPRATROPIUM BROMIDE SCH MG: 0.5 SOLUTION RESPIRATORY (INHALATION) at 02:15

## 2025-01-02 RX ADMIN — FUROSEMIDE ONE MG: 10 INJECTION, SOLUTION INTRAMUSCULAR; INTRAVENOUS at 18:00

## 2025-01-02 RX ADMIN — APIXABAN SCH MG: 5 TABLET, FILM COATED ORAL at 21:05

## 2025-01-02 RX ADMIN — APIXABAN ONE MG: 5 TABLET, FILM COATED ORAL at 13:00

## 2025-01-02 RX ADMIN — POTASSIUM CHLORIDE ONE MEQ: 1500 TABLET, EXTENDED RELEASE ORAL at 02:55

## 2025-01-02 RX ADMIN — ATORVASTATIN CALCIUM ONE MG: 20 TABLET, FILM COATED ORAL at 02:56

## 2025-01-02 RX ADMIN — IBUPROFEN ONE MG: 600 TABLET, FILM COATED ORAL at 06:19

## 2025-01-02 RX ADMIN — SODIUM CHLORIDE SCH MG: 9 INJECTION, SOLUTION INTRAVENOUS at 10:26

## 2025-01-02 RX ADMIN — ATORVASTATIN CALCIUM SCH MG: 20 TABLET, FILM COATED ORAL at 21:04

## 2025-01-02 RX ADMIN — POTASSIUM CHLORIDE SCH MEQ: 750 TABLET, FILM COATED, EXTENDED RELEASE ORAL at 09:36

## 2025-01-02 RX ADMIN — ALBUTEROL SULFATE SCH MG: 2.5 SOLUTION RESPIRATORY (INHALATION) at 02:15

## 2025-01-02 NOTE — DVHPNRES
Progress Note


Date Seen:  Jan 2, 2025


Resident Creating Document:  ARMAAN PONCE RESIDENT


Medical Necessity Reason


Pt with a Central, PICC or Fol:  No





Subjective


Review of Systems


Patient is a 64-year-old female with a past medical history of heart failure 

with reduced ejection fraction, atrial fibrillation, COPD, coronary artery 

disease , congenital heart disease s/p surgery at 10 months of age came to the 

ED with a chief complaint of worsening shortness of breath for about a week.  

Patient was admitted to the hospital 1 month ago for a similar complaint of 

shortness of breath was discharged on oral antibiotic azithromycin.  Patient 

reported she was feeling well since about a week ago when she went to see her 

daughter where her grand kids were sick with flu-like symptoms following which 

she also developed cold, congestion, dry cough, sore throat, fever and headache 

and subsequently started to feel short of breath.  Patient reports to be using 

oxygen at home 2 L, for the last 7 years usually at night with the CPAP machine 

as she reports severe sleep apnea





Patient examined at bedside. patient also reported mild chest tenderness that 

exacerbates on palpation on the left side, pleuritic type chest pain also during

coughing. 


Patient will continue on telemetry monitor .  Continue on current IV antibiotic 

therapy, and oxygen supplementation , currently on 3 L via nasal cannula, and 

Lasix 40 mg IV daily .


Patient was evaluated by Cardiology, patient will need to continue on Eliquis 5 

mg b.i.d., echo is current pending.  


Pacemaker interrogation is pending.  


Monitor electrolytes keep potassium above 4 and magnesium above 2. 





ROS:


Constitutional:  Malaise, Weakness


Eyes:  No: Pain, Vision change, Conjunctivae inflammation, Eyelid inflammation, 

Other, Redness


ENT:  No: Ear pain, Ear discharge, Nose pain, Nose discharge, Nose congestion, 

Mouth pain, Mouth swelling, Throat pain, Throat swelling, Other


Respiratory: Dyspnea, Cough, Pleuritic Chest Pain


Cardiovascular: Paroxysmal Noc. Dyspnea, Lt Headedness


Gastrointestinal:  No: Nausea, Vomiting, Abdominal Pain, Diarrhea, Constipation,

Melena, Hematochezia, Other


Musculoskeletal:  No: other, neck pain, shoulder pain, arm pain, back pain, hand

pain, leg pain, foot pain


Neurological:; No: Weakness, Numbness, Incoordination, Change in speech, 

Confusion, Seizures


Patient reports:  Feels better


Changes from previous H/P or p:  Changes





Objective


vital signs





                                   Vital Sign








  Date Time  Temp Pulse Resp B/P (MAP) Pulse Ox O2 Delivery O2 Flow Rate FiO2


 


1/2/25 17:54    97/65    


 


1/2/25 17:00 97.9 106 16  92   





 97.9       


 


1/2/25 12:04      Nasal Cannula* 3 32














                           Total Intake and Output   


 


 1/1/25 1/1/25 1/2/25





 15:00 23:00 07:00


 


Intake Total  150 ml 300 ml


 


Balance  150 ml 300 ml








medications





                               Current Medications








 Medications  Dose


 Ordered  Sig/Prabha


 Route  Start Time


 Stop Time Status Last Admin


Dose Admin


 


 Nitroglycerin  0.4 mg  Q5MINP  PRN


 SL  1/1/25 22:00


     





 


 Morphine Sulfate  2 mg  Q30M  PRN


 IV  1/1/25 22:00


     





 


 Furosemide  40 mg  BIDD


 IV  1/2/25 06:00


    1/2/25 05:31


40 MG


 


 Levofloxacin/


 Dextrose  150 ml @ 


 100 mls/hr  DAILY


 IV  1/2/25 10:00


    1/2/25 09:36


100 MLS/HR


 


 Albuterol  2.5 mg  Q6HR


 NEB  1/2/25 00:00


    1/2/25 12:04


2.5 MG


 


 Ipratropium


 Bromide  0.5 mg  Q6HR


 NEB  1/2/25 00:00


    1/2/25 12:04


0.5 MG


 


 Potassium Chloride  10 meq  DAILY


 PO  1/2/25 10:00


    1/2/25 09:36


10 MEQ


 


 Aspirin  81 mg  DAILY


 PO  1/2/25 10:00


    1/2/25 09:36


81 MG


 


 Acetaminophen/


 Hydrocodone Bitart  1 tab  Q6HP  PRN


 PO  1/1/25 22:15


     





 


 Atorvastatin


 Calcium  40 mg  HS


 PO  1/2/25 22:00


     





 


 Famotidine  20 mg  DAILY


 IV  1/2/25 10:00


    1/2/25 10:26


20 MG


 


 Guaifenesin  200 mg  Q4HP  PRN


 PO  1/2/25 13:00


    1/2/25 15:45


200 MG


 


 Apixaban  5 mg  BID


 PO  1/2/25 22:00


     











Examination


Constitutional:  Patient was alert and oriented to time, place and person 

appears to be in mild distress because of the headache and chronic back pain.


General: no pallor, no icterus, no cyanosis, no clubbing, no LAD, no edema . 


Skin: Patients skin is warm and dry.. 


HEENT: normocephalic, atraumatic, dry mucous membranes.


Pulmonary: breath sounds diminished in the left lower side as bear to the right 

with coarse inspiratory crackles of the left side.


cardiovascular: normal S1,S2 heard. no murmurs heard. peripheral pulses radial 

2+, pedal 2+. 


GI: soft abdomen without tenderness to palpation . no hepatospleenomegaly.  

Bowel sounds normoactive


Neurological:  Bilateral upper extremity strength 5/5, bilateral lower extremity

strength 5/5, no facial droop, normal speech, no tremor, no sensory deficiets.


laboratory and microbiology


                                Laboratory Tests


1/2/25 14:30

















Test


 1/2/25


14:30 Range/Units


 


 


Serum Glucose 123 H   mg/dL








                                  Microbiology








 Date/Time


Source Procedure


Growth Status





 


 1/2/25 07:28


Nose MRSA Screen - Final


 Complete


 


 1/1/25 14:30


Blood Blood Culture - Preliminary


NO GROWTH AFTER 24 HOURS OF INCUBATION. Resulted











Problem List/Assessment/Plan


Problem List/Assessment/Plan


# acute hypoxic respiratory failure likely multifactorial due to acute on 

chronic systolic heart failure ,? CAP, COPD exacerbation


# complicated post viral pneumonia likely due to Gram+/-versus atypical bacteria


#multifocal atypical infection with more focal consolidation developing in the 

left lower lobe.


- on 3 L oxygen via nasal cannula


- chest x-ray shows bilateral opacities with the obstruction of the left CP 

angle, increased pulmonary vascular marking, interstitial pulmonary edema


- elevated CRP


- on levofloxacin 750 mg q.d. IV


- echo pending, previous echo in 2022 shows 30-35% EF, concentric LVH, dilated 

LV, diffuse hypokinesis of left ventricle


- Lasix 40 mg IV Daily





# paroxysmal atrial fibrillation


- Telemetry


- currently heart rate is controlled


- Eliquis 5 mg b.i.d.


- Echo pending





# acute chest pain, ruled out ACS


- EKG revealed sinus rhythm, IVCD with nonspecific ST changes


- Tele reveals sinus rhythm and occasions of atrial fibrillation with RVR


- troponins are under normal limits


- on aspirin 81 mg and atorvastatin 40 mg


- pacemaker interrogation


- cardio on board





# obstructive sleep apnea


- BiPAP at night





# hypokalemia


- adequately replaced


- on potassium 10 mEq daily


- Follow-up electrolytes and kidney function tests and correct abnormalities.  

Keep potassium above 4 and magnesium above 2





# microcytic hypochromic anemia


- iron panel, ferritin pending


-monitor H&H





#Obesity


- lifestyle modification counseling, dietary habits counseling.





Case discussed with Dr. Hess 


Goals of care discussed with the patient for 36 minutes 


Code status:  Full code





Plan discussed with:  Patient





My Orders


My Orders





                       Orders - ARMAAN PONCE








Procedure Category Date Status





  Time 


 


Guaifenesin Plain PHA 1/2/25 In Process





Liquid (Robitussin Sherri  13:00 


 


Furosemide Injection PHA 1/3/25 Logged





 (Lasix Injection)  18:00 


 


Technician To Assess ORDERS 1/2/25 Transmitted





Pacemaker  18:43 











Date of Service:  Jan 2, 2025


Billing Provider:  TATIANNA RILEY MD


Common Visit Codes:  84807-[id number] INP/OBS CARE(HIGH)











ARMAAN PONCE       Jan 2, 2025 19:10


TATIANNA RILEY MD                Jose 3, 2025 08:26

## 2025-01-02 NOTE — DVHINCON2
Date of service:  Jan 2, 2025





History of Present Illness


HPI


Patient is a 64-year-old female who presented with few days of worsening 

shortness of breath.  She mentions that she got sick from her grand children who

were sick also.  She also mentions that she had an ammonia around Thanksgiving. 

Cardiology is involved for cardiac aspects of care.  Patient is known to our 

practice from outside on before.  Patient does have history of systolic heart 

failure for which has ICD (Biotronik).  History also includes paroxysmal AFib 

for which she is on Eliquis.  She denies palpitations.  She does complain of 

pleuritic type chest pain during coughing.  She did have some coryza.


Home Meds


Reported Medications


Sotalol HCl (Sotalol Hydrocholride) 80 Mg Tab, 1 PO DAILY


   1/2/25


Pantoprazole Sodium Sesquihydr (Pantoprazole Sodium) 40 Mg Tab, 1 TAB PO DAILY


   1/2/25


Ondansetron HCl (Ondansetron) 4 Mg Tab, 8 MG PO DAILY, TAB


   11/29/24


Alprazolam (Xanax) 0.25 Mg Tb, 5 MG PO PRN for ANXIETY, TAB


   11/29/24


Zolpidem Tartrate (Zolpidem Tartrate) 5 Mg Tab, 1 TAB PO HS, #30 TAB 2 Refills


   11/29/24


Icosapent Ethyl (Icosapent Ethyl) 1 Gm Cap, 2 CAP PO BID, CAP


   11/29/24


Aspirin (Aspir-Low) 81 Mg Tab, 81 MG PO DAILY for 30 Days, MG


   11/29/24


Vortioxetine Hydrobromide (Trintellix) 20 Mg Tab, 20 MG PO DAILY, TAB


   11/29/24


Magnesium Oxide (Mag-Ox) 400 Mg Tb, 400 MG PO DAILY, TAB


   11/29/24


Hydrocodone-Acetaminophen (Hydrocodone Bitartrate/AC  mg) 1 Tab Tab, 1 TAB

PO Q6HP, TAB


   11/7/22


Losartan Potassium (Losartan Potassium) 25 Mg Tab, 20 MG PO DAILY for 30 Days, 

MG


   9/14/18


Potassium Chloride (POTASSIUM CHLORIDE CR) 10 Meq Tb, 1 TAB PO DAILY, #30 TAB 5 

Refills


   9/13/18


Furosemide (Lasix) 20 Mg Tb, 1 TAB PO DAILY, #90 TAB 1 Refill


   9/13/18


Apixaban Base (ELIQUIS) 5 Mg Tab, 5 MG PO BID, TAB


   9/13/18


Carvedilol (Carvedilol) 3.125 Mg Tab, 6.25 MG PO BID


   3/4/13





Past Medical History


Others


Not in acute distress.  Not using accessory muscles of breathing.  Mucosa is 

pink and wet.  No carotid bruit.  No JVD.  Chest:  Scattered rhonchi in the 

lungs is heard.  Cardiac:  Regular, no thrill.  Systolic murmur in the apex is 

heard.  Abdomen is soft.  There is no gross mass/hepatomegaly.  Extremities 

reveal 1+ edema bilateral.  Dorsalis pedis is 2+ bilateral.


Patient Family History:  


Cancer


  G8 MOTHER, Onset:50's - 60


  G8 FATHER, Onset:60 years & older


Family history: Diabetes mellitus


  G8 FATHER, Onset:30's - 40


  G8 BROTHER, Onset:30's - 40


Smoker:  Quit


Lives with:  Other (Trailer home)





Review of Systems


Constitutional:  Malaise, Weakness


Ears, Nose, & Throat:  No symptom reported


Pulmonary/Respiratory:  Dyspnea, Cough, Pleuritic Chest Pain


Cardiovascular:  Paroxysmal Noc. Dyspnea, Lt Headedness


All Other Systems


14 point review of system was performed.  Relevant findings as per above and as 

per HPI.  Otherwise negative.





H&P Exam


Vital Signs





                                   Vital Signs








  Date Time  Temp Pulse Resp B/P (MAP) Pulse Ox O2 Delivery O2 Flow Rate FiO2


 


1/2/25 08:49 97.5 101 17 105/47 (66) 92   





 97.5       


 


1/2/25 08:21      Nasal Cannula* 5 40








General Appeara:  Well developed, Mild distress


Neck Exam:  Normal inspection


Eye Exam:  bilateral eye PERRL


Mouth:  Normal Inspection


Pulmonary/Respiratory:  Rhonci


Cardiovascular/Chest:  Systolic murmur


Peripheral Pulses:  2+ carotid (R), 2+ carotid (L), 2+ femoral (R), 2+ femoral 

(L), 2+ dorsalis pedis (R), 2+ dorsalis pedis (L)


Abdominal Exam:  Normal bowel sounds, Soft, No tenderness


Neuro/Mental St:  Alert, Oriented


Appearance:  Appropriate appearance


Eye contact/ Speech:  Cooperative





Labs/Xrays





                                      Labs








Test


 1/1/25


22:23 1/1/25


17:30 1/1/25


17:13 1/1/25


15:31 Range/Units


 


 


White Blood Count


 3.4 #L


 


 


 


 4.4-10.8


10^3/uL


 


Red Blood Count


 5.07 


 


 


 


 4.0-5.20


10^6/uL


 


Hemoglobin 11.6 L    12.2-16.2  g/dL


 


Hematocrit 37.0     36.0-46.0  %


 


Mean Corpuscular Volume 72.9 L    80.0-100.0  fL


 


Mean Corpuscular Hemoglobin 22.8 L    28.0-32.0  pg


 


Mean Corpuscular Hemoglobin


Concent 31.2 L


 


 


 


 32.0-36.0  g/dL





 


Red Cell Distribution Width 17.0 H    11.8-14.3  %


 


Platelet Count


 202 


 


 


 


 140-450


10^3/uL


 


Mean Platelet Volume 6.8 L    6.9-10.8  fL


 


Neutrophils (%) (Auto) 89.3 H    37.0-80.0  %


 


Lymphocytes (%) (Auto) 7.6 L    10.0-50.0  %


 


Monocytes (%) (Auto) 2.9     0.0-12.0  %


 


Eosinophils (%) (Auto) 0.0     0.0-7.0  %


 


Basophils (%) (Auto) 0.2     0.0-2.0  %


 


Neutrophils # (Auto)


 3.0 


 


 


 


 1.6-8.6  10


^3/uL


 


Lymphocytes # (Auto)


 0.3 L


 


 


 


 0.4-5.4  10


^3/uL


 


Monocytes # (Auto) 0.1     0-1.3  10 ^3/uL


 


Eosinophils # (Auto) 0     0-0.8  10 ^3/uL


 


Basophils # (Auto) 0     0-0.2  10 ^3/uL


 


Nucleated Red Blood Cells 0.1      %


 


Erythrocyte Sedimentation Rate 14     0-20  mm/hr


 


Sodium Level 138     136-145  mmol/L


 


Potassium Level 3.8     3.5-5.1  mmol/L


 


Chloride Level 103       mmol/L


 


Carbon Dioxide Level 27     20-31  mmol/L


 


Anion Gap 8     5-15  


 


Blood Urea Nitrogen 18     9-23  mg/dL


 


Creatinine


 0.98 


 


 


 


 0.550-1.02


mg/dL


 


Glomerular Filtration Rate


Calc 65 


 


 


 


 >90  mL/min





 


BUN/Creatinine Ratio 18.4     10.0-20.0  


 


Serum Glucose 181 H      mg/dL


 


Calcium Level 9.1     8.7-10.4  mg/dL


 


Total Bilirubin 0.3     0.2-1.0  mg/dL


 


Aspartate Amino Transferase


(AST) 19 


 


 


 


 13-40  U/L





 


Alanine Aminotransferase (ALT) 15     7-40  U/L


 


Alkaline Phosphatase 66       U/L


 


C-Reactive Protein High


Sensitivity 7.79 H


 


 


 


 <1.0  mg/dL





 


Total Protein 6.9     5.7-8.2  g/dL


 


Albumin 4.3     3.2-4.8  g/dL


 


Lipase 51     12-53  U/L


 


Troponin I High Sensitivity  16    </=34  ng/L


 


Lactic Acid Level   0.9   0.4-2.0  mmol/L


 


Influenza Type A Antigen    Negative  Negative  


 


Influenza Type B Antigen    Negative  Negative  


 


SARS-CoV-2 Antigen (Rapid)    Negative  NEGATIVE  


 


Test


 1/1/25


15:29 1/1/25


14:33 


 


 Range/Units


 


 


Urine Color Straw     Yellow  


 


Urine Clarity Clear     Clear  


 


Urine pH 5.5     5.0-9.0  


 


Urine Specific Gravity 1.009     1.001-1.035  


 


Urine Protein Negative     Negative  


 


Urine Ketones Negative     Negative  


 


Urine Blood Negative     Negative  /uL


 


Urine Nitrite Negative     Negative  


 


Urine Bilirubin Negative     Negative  


 


Urine Urobilinogen Normal     Negative  mg/dL


 


Urine Leukocyte Esterase Negative     Negative  /uL


 


Urine RBC None seen     0 - 4  /hpf


 


Urine WBC <1     0 - 5  /hpf


 


Urine Squamous Epithelial


Cells Few 


 


 


 


 <5  /hpf





 


Urine Bacteria None seen     None Seen  /hpf


 


Urine Hyaline Casts Few     0 - 2  /lpf


 


Urine Glucose Normal     Normal  mg/dL


 


B-Type Natriuretic Peptide  188.82    0-100  pg/mL











Assessment/Plan


Plan


Patient is a 64-year-old female who presented with few days of worsening 

shortness of breath.  She mentions that she got sick from her grand children who

were sick also.  She also mentions that she had an ammonia around Thanksgiving. 

Cardiology is involved for cardiac aspects of care.  Patient is known to our 

practice from outside on before.  Patient does have history of systolic heart 

failure for which has ICD (Biotronik).  History also includes paroxysmal AFib 

for which she is on Eliquis.  She denies palpitations.  She does complain of 

pleuritic type chest pain during coughing.  She did have some coryza.  





Not in acute distress.  Not using accessory muscles of breathing.  Mucosa is 

pink and wet.  No carotid bruit.  No JVD.  Chest:  Scattered rhonchi in the 

lungs is heard.  Cardiac:  Regular, no thrill.  Systolic murmur in the apex is 

heard.  Abdomen is soft.  There is no gross mass/hepatomegaly.  Extremities 

reveal 1+ edema bilateral.  Dorsalis pedis is 2+ bilateral.





Past medical history includes hypertension, hyperlipidemia, COPD, systolic heart

failure, paroxysmal AFib, status post ICD (Biotronik) implantation, questionable

coronary artery disease, obstructive sleep apnea, peripheral artery disease 

(history of right subclavian artery stenosis), diverticulosis, fatty liver, type

2 pulmonary hypertension, history of congenital heart disease (for which had 

surgery in infancy) and status post appendectomy.  She stopped methamphetamine 

many years ago.  She stopped alcohol 2 years ago.  She stopped cigarette smoking

years ago.  Denies active marijuana use


Echocardiogram of November 2022 had reported dilated 4 chambers, ejection fracti

on of 30-35%, pseudonormal LV diastolic filling, moderate MR, mild TR, pacing 

wire in right-sided chambers and right ventricular systolic pressure of 36 mm Hg


Echocardiogram of April 2024 (performed in the office) had reported improved 

ejection fraction and moderate MR





Creatinine:  0.82 - 0.98


Potassium:  4.0 - 3.8 


Troponin (high sensitive):  12 -15 - 16 


BNP: 188.82





Chest x-ray reported (images were reviewed by self): IMPRESSION: 1. Moderate 

cardiomegaly, stable 2. Findings compatible with CHF pattern, slightly improved 

since the prior study 





EKG revealed sinus rhythm, IVCD with nonspecific ST changes





Tele reveals sinus rhythm and occasions of atrial fibrillation with RVR





Patient is a 64-year-old female who presented with worsening shortness of breath

and acute respiratory failure.  Presentation is in favor of 

bronchitis/pneumonia.  COPD exacerbation could have contributed to the clinical 

picture.  Patient does have history of systolic heart failure and component of 

acute on chronic systolic heart failure secondary to intercurrent disease could 

have contributed to the clinical picture.  Patient did have some sick contact 

which could have contributed to the clinical picture.  It is of note that the 

patient does have substance abuse but stopped many years ago.  Patient also has 

had congenital heart disease for which had surgery during infancy.  Patient is 

also obese which could have contributed to the clinical picture 


Acute respiratory failure 


Pneumonia, community-acquired 


COPD exacerbation 


Acute on chronic systolic heart failure


Congenital heart disease 


Hypertension


Hyperlipidemia 


History of fatty liver 


Peripheral artery disease 


Obstructive sleep apnea 


Morbid obesity





Cardiac suggestion for management:





Manage on telemetry


Gentle diuresis is suggested


Follow-up electrolytes and kidney function tests and correct abnormalities.  

Keep potassium above 4 and magnesium above 2


Request for echocardiogram


Request for interrogation of the ICD (Biotronik)





Consider CT scan of the chest without contrast 


Pulmonary evaluation for management of COPD exacerbation is suggested 





Management of pneumonia (antibiotics...) as per primary team/Pulmonary





Long-term continuation of full anticoagulation (on Eliquis) is suggested


Guideline directed medical therapy for systolic heart failure 





Further evaluation and management depends on the above and clinical course 





Thank you for consultation 





A total of 75 minutes was spent reviewing the patient record, examining the 

patient, making a diagnostic and therapeutic plan, discussing this plan with 

medical personnel, following up on diagnostic studies and following the patient 

for clinical stability excluding any and all procedures.  At least 50% of this 

time was spent in direct, face-to-face contact.





Thank you for allowing me to participate in this patient's care.


Further recommendations will depend on patient's clinical course.


Please do not hesitate to contact me if you have any questions or concerns.





This medical document was created using electronic medical record system with 

[employer] computerized dictation system.  Although this document has been carefully

reviewed, there may still be some phonetic and typographical errors.  These 

areas are purely typographical due to the imperfection of the software programs,

and do not reflect any compromise in the patient's medical care.


Plan discussed with:  Patient, Other (Nurse)











CELY TELLO MD                 Jan 2, 2025 09:14

## 2025-01-02 NOTE — DVH
Procedure: CT CHEST WITHOUT CONTRAST



Reason for study/Clinical History: sob



Comparison Study: 11/06/2022



Exam Date: 01/02/2025 12:18 PM



TECHNIQUE: Multidetector CT of the chest was performed from the lung apices to the upper abdomen with
out the use of intravenous contract. Axial, coronal and sagittal multiplanar reformats were performed
.



Radiation Dose Information:



CT Dose: CTDI volume is 13.99 mGy. Dose-length product is 497.47 mGy*cm



The dose indicators for CT are the volume Computed Tomography (CT) Dose Index (CTDIvol) and the Dose 
Length Product (DLP), and are measured in units of mGy and mGy-cm, respectively. These indicators are
 not patient dose, but values generated from the CT scanner acquisition factors. The report includes 
radiation exposure data for exposures received during this examination. 



FINDINGS: 



Lower neck: Normal thyroid.



Lungs: Multifocal airspace disease with developing consolidation in the left lower lobe. Mild-to-mode
rate centrilobular emphysema.



Heart/Vascular Structures: Cardiomegaly. Coronary artery calcifications. Vascular calcifications of t
he aorta. 



Lymph Nodes: No adenopathy



Pleura: No pleural effusion or significant pneumothorax.



Musculoskeletal: No acute osseous abnormality.



Soft tissues: Left chest wall pacemaker.  Thoracic spinal stimulator device in-situ.



Upper abdomen: Limited portions of the upper abdomen are unremarkable.



IMPRESSION: 



Findings are suggestive of multifocal atypical infection with more focal consolidation developing in 
the left lower lobe.



Cardiomegaly and likely chronic CHF.



Radiation optimization: All CT scans at this facility use at least one of these dose optimization margarito
hniques: automated exposure control  mA and/or kV adjustment per patient size (includes targeted exam
s where dose is matched to clinical indication)  or iterative reconstruction.



Electronically Signed by: Samm West at 01/02/2025 12:43:32 PM

## 2025-01-03 VITALS
RESPIRATION RATE: 20 BRPM | TEMPERATURE: 98.5 F | SYSTOLIC BLOOD PRESSURE: 107 MMHG | OXYGEN SATURATION: 95 % | DIASTOLIC BLOOD PRESSURE: 55 MMHG | HEART RATE: 61 BPM

## 2025-01-03 VITALS — RESPIRATION RATE: 20 BRPM | HEART RATE: 106 BPM | OXYGEN SATURATION: 99 %

## 2025-01-03 VITALS — OXYGEN SATURATION: 98 % | RESPIRATION RATE: 22 BRPM | HEART RATE: 101 BPM

## 2025-01-03 VITALS
SYSTOLIC BLOOD PRESSURE: 94 MMHG | HEART RATE: 80 BPM | DIASTOLIC BLOOD PRESSURE: 45 MMHG | RESPIRATION RATE: 20 BRPM | OXYGEN SATURATION: 96 % | TEMPERATURE: 97.4 F

## 2025-01-03 VITALS
SYSTOLIC BLOOD PRESSURE: 113 MMHG | RESPIRATION RATE: 20 BRPM | TEMPERATURE: 97.8 F | DIASTOLIC BLOOD PRESSURE: 72 MMHG | HEART RATE: 100 BPM | OXYGEN SATURATION: 95 %

## 2025-01-03 VITALS
TEMPERATURE: 98.3 F | DIASTOLIC BLOOD PRESSURE: 71 MMHG | SYSTOLIC BLOOD PRESSURE: 117 MMHG | HEART RATE: 84 BPM | OXYGEN SATURATION: 90 % | RESPIRATION RATE: 17 BRPM

## 2025-01-03 VITALS — RESPIRATION RATE: 22 BRPM | HEART RATE: 100 BPM | OXYGEN SATURATION: 98 %

## 2025-01-03 VITALS — HEART RATE: 91 BPM | RESPIRATION RATE: 20 BRPM | OXYGEN SATURATION: 100 %

## 2025-01-03 VITALS
OXYGEN SATURATION: 98 % | HEART RATE: 93 BPM | TEMPERATURE: 98.3 F | RESPIRATION RATE: 18 BRPM | SYSTOLIC BLOOD PRESSURE: 94 MMHG | DIASTOLIC BLOOD PRESSURE: 46 MMHG

## 2025-01-03 VITALS — OXYGEN SATURATION: 96 %

## 2025-01-03 VITALS — HEART RATE: 98 BPM | RESPIRATION RATE: 20 BRPM | OXYGEN SATURATION: 98 %

## 2025-01-03 VITALS — HEART RATE: 118 BPM | OXYGEN SATURATION: 95 % | RESPIRATION RATE: 20 BRPM

## 2025-01-03 VITALS — HEART RATE: 110 BPM | OXYGEN SATURATION: 96 % | RESPIRATION RATE: 20 BRPM

## 2025-01-03 VITALS — OXYGEN SATURATION: 97 % | RESPIRATION RATE: 20 BRPM | HEART RATE: 104 BPM

## 2025-01-03 VITALS — RESPIRATION RATE: 19 BRPM | HEART RATE: 86 BPM

## 2025-01-03 VITALS — RESPIRATION RATE: 22 BRPM | HEART RATE: 103 BPM | OXYGEN SATURATION: 94 %

## 2025-01-03 VITALS — OXYGEN SATURATION: 96 % | HEART RATE: 80 BPM | RESPIRATION RATE: 20 BRPM

## 2025-01-03 VITALS — OXYGEN SATURATION: 94 %

## 2025-01-03 LAB
ANION GAP SERPL CALCULATED.3IONS-SCNC: 5 MMOL/L (ref 5–15)
BUN SERPL-MCNC: 20 MG/DL (ref 9–23)
BUN/CREAT SERPL: 26.7 (ref 10–20)
CALCIUM SERPL-MCNC: 9.4 MG/DL (ref 8.7–10.4)
CHLORIDE SERPL-SCNC: 105 MMOL/L (ref 98–107)
CO2 SERPL-SCNC: 30 MMOL/L (ref 20–31)
GLUCOSE SERPL-MCNC: 92 MG/DL (ref 74–106)
HCT VFR BLD AUTO: 36.3 % (ref 36–46)
HGB BLD-MCNC: 11.9 G/DL (ref 12.2–16.2)
MCH RBC QN AUTO: 23.9 PG (ref 28–32)
MCV RBC AUTO: 72.7 FL (ref 80–100)
NRBC BLD QL AUTO: 0.2 %
POTASSIUM SERPL-SCNC: 4.1 MMOL/L (ref 3.5–5.1)
SODIUM SERPL-SCNC: 140 MMOL/L (ref 136–145)

## 2025-01-03 PROCEDURE — 4B02XSZ MEASUREMENT OF CARDIAC PACEMAKER, EXTERNAL APPROACH: ICD-10-PCS | Performed by: STUDENT IN AN ORGANIZED HEALTH CARE EDUCATION/TRAINING PROGRAM

## 2025-01-03 PROCEDURE — 5A09357 ASSISTANCE WITH RESPIRATORY VENTILATION, LESS THAN 24 CONSECUTIVE HOURS, CONTINUOUS POSITIVE AIRWAY PRESSURE: ICD-10-PCS | Performed by: INTERNAL MEDICINE

## 2025-01-03 RX ADMIN — SACUBITRIL AND VALSARTAN SCH TAB: 24; 26 TABLET, FILM COATED ORAL at 09:59

## 2025-01-03 RX ADMIN — HYDROCODONE BITARTRATE AND ACETAMINOPHEN PRN TAB: 7.5; 325 TABLET ORAL at 00:59

## 2025-01-03 RX ADMIN — ONDANSETRON HYDROCHLORIDE ONE MG: 2 INJECTION, SOLUTION INTRAMUSCULAR; INTRAVENOUS at 09:59

## 2025-01-03 RX ADMIN — FUROSEMIDE SCH MG: 10 INJECTION, SOLUTION INTRAMUSCULAR; INTRAVENOUS at 18:00

## 2025-01-03 RX ADMIN — KETOROLAC TROMETHAMINE PRN MG: 30 INJECTION, SOLUTION INTRAMUSCULAR; INTRAVENOUS at 16:42

## 2025-01-03 NOTE — DVHINCON2
Date of service:  Jose 3, 2025


Referring Physician


Jayden Nguyen MD





Reason for Consultation


Chronic hypoxic respiratory failure, COPD exacerbation





History of Present Illness


A 64-year-old woman with PMHx of heart failure with reduced ejection fraction, 

atrial fibrillation, COPD, coronary artery disease , congenital heart disease 

s/p surgery @ 10 months of age who presented to ED on 1/1/25 with c/o worsening 

shortness of breath x1 week.  Patient was hospitalized 1 month ago for similar 

sx and discharged on oral antibiotic azithromycin.  Reports she was feeling well

since about a week ago when she had positive sick contact w/ grandchildren and 

developed cold, congestion, dry cough, sore throat, fever and headache and 

subsequently started to feel short of breath.  She is on home O2 for the past 7 

years usually at night with the CPAP machine as she has severe sleep apnea, but 

on and off in the day at 2 LPM NC. Pt also reported associated chest pain on the

left side, pressure-like, which radiated to the neck and jaw, with associated sx

of palpitations and sweating.  Denied N/V/D or abdominal pain.  Patient was 

admitted for further care and pulmonary consultation is requested for evaluation

and management due to these findings.





Review of Systems:


14-point review of systems negative unless otherwise noted above.





Past Medical History:


Heart failure with reduced ejection fraction, atrial fibrillation, coronary 

artery disease





Past Surgical History:


Pacemaker/AICD, congenital heart disease s/p surgery @ 10 months





Medications:


Reviewed.





Allergies:


Penicillins.





Family History:


Cancer, diabetes mellitus.





Social History:


Previous smoker, half a pack a day for about 20 years


History of alcohol use but she quit 2-3 years ago


History of methamphetamine use but quit 7 years ago





Family History:  


Cancer


  G8 MOTHER, Onset:50's - 60


  G8 FATHER, Onset:60 years & older


Family history: Diabetes mellitus


  G8 FATHER, Onset:30's - 40


  G8 BROTHER, Onset:30's - 40





Allergies:  


Coded Allergies:  


     Penicillins (Verified  Allergy, Unknown, 9/12/18)


Home Meds


Reported Medications


Sotalol HCl (Sotalol Hydrocholride) 80 Mg Tab, 1 PO DAILY


   1/2/25


Pantoprazole Sodium Sesquihydr (Pantoprazole Sodium) 40 Mg Tab, 1 TAB PO DAILY


   1/2/25


Ondansetron HCl (Ondansetron) 4 Mg Tab, 8 MG PO DAILY, TAB


   11/29/24


Alprazolam (Xanax) 0.25 Mg Tb, 5 MG PO PRN for ANXIETY, TAB


   11/29/24


Zolpidem Tartrate (Zolpidem Tartrate) 5 Mg Tab, 1 TAB PO HS, #30 TAB 2 Refills


   11/29/24


Icosapent Ethyl (Icosapent Ethyl) 1 Gm Cap, 2 CAP PO BID, CAP


   11/29/24


Aspirin (Aspir-Low) 81 Mg Tab, 81 MG PO DAILY for 30 Days, MG


   11/29/24


Vortioxetine Hydrobromide (Trintellix) 20 Mg Tab, 20 MG PO DAILY, TAB


   11/29/24


Magnesium Oxide (Mag-Ox) 400 Mg Tb, 400 MG PO DAILY, TAB


   11/29/24


Hydrocodone-Acetaminophen (Hydrocodone Bitartrate/AC  mg) 1 Tab Tab, 1 TAB

PO Q6HP, TAB


   11/7/22


Losartan Potassium (Losartan Potassium) 25 Mg Tab, 20 MG PO DAILY for 30 Days, 

MG


   9/14/18


Potassium Chloride (POTASSIUM CHLORIDE CR) 10 Meq Tb, 1 TAB PO DAILY, #30 TAB 5 

Refills


   9/13/18


Furosemide (Lasix) 20 Mg Tb, 1 TAB PO DAILY, #90 TAB 1 Refill


   9/13/18


Apixaban Base (ELIQUIS) 5 Mg Tab, 5 MG PO BID, TAB


   9/13/18


Carvedilol (Carvedilol) 3.125 Mg Tab, 6.25 MG PO BID


   3/4/13


Current Medications





                               Current Medications








 Medications


  (Trade)  Dose


 Ordered  Sig/Prabha


 Route


 PRN Reason  Start Time


 Stop Time Status Last Admin


 


 Sacubitril/


 Valsartan


  (Entresto 24-26


 Mg tab)  1 tab  BID


 PO


   1/3/25 10:00


   Hold 1/3/25 09:59





 


 Iron Sucrose  110 ml @ 


 110 mls/hr  DAILY@1200


 IV


   1/4/25 12:00


 1/8/25 12:59   





 


 Furosemide


  (Lasix Injection)  40 mg  BIDD


 IV


   1/3/25 18:00


     





 


 Ketorolac


 Tromethamine


  (Toradol


 Injection)  15 mg  Q6HPRN  PRN


 IV


 MODERATE PAIN (4-6 PAIN SCALE)  1/3/25 15:45


 1/8/25 15:44  1/3/25 16:42














Vital Signs





                                   Vital Signs








  Date Time  Temp Pulse Resp B/P (MAP) Pulse Ox O2 Delivery O2 Flow Rate FiO2


 


1/3/25 21:00  101   96 Facial BiPAP Mask  40


 


1/3/25 21:00 97.4  20 94/45 (61)    





 97.4       


 


1/3/25 18:43       3 








Physical Exam


Gen.: Patient lying in bed in no apparent distress. On supplemental oxygen.


Head: Normocephalic, atraumatic.


Eyes: EOMI/PERRLA.


Ears: Normal hearing. Normal anatomy.


Neck/trachea: Trachea midline, supple.


Nose: Normal external anatomy.


Mouth: Moist mucous membranes.


Chest: Decreased air entry bilaterally. Bilateral wheezing. No rhonchi.


Cardiovascular: Positive S1, positive S2. Regular rate and rhythm.


Abdomen: Positive bowel sounds in all 4 quadrants. Soft, non-tender, non-

distended.


: Deferred.


Rectal: Deferred.


Skin: Warm, dry. Intact.


Extremities: 2+ radial pulses bilaterally. No lower extremity edema.


Neuro: Awake, alert, oriented x3. No gross motor or sensory deficits. Cranial 

nerves II through XII intact. Gait not assessed.





Labs/Diagnostic Data





                                      Labs








Test


 1/3/25


06:26 1/2/25


14:30 1/2/25


09:55 1/1/25


22:23 Range/Units


 


 


White Blood Count


 5.6 


 


 


 


 4.4-10.8


10^3/uL


 


Red Blood Count


 5.00 


 


 


 


 4.0-5.20


10^6/uL


 


Hemoglobin 11.9 L    12.2-16.2  g/dL


 


Hematocrit 36.3     36.0-46.0  %


 


Mean Corpuscular Volume 72.7 L    80.0-100.0  fL


 


Mean Corpuscular Hemoglobin 23.9 L    28.0-32.0  pg


 


Mean Corpuscular Hemoglobin


Concent 32.8 


 


 


 


 32.0-36.0  g/dL





 


Red Cell Distribution Width 16.9 H    11.8-14.3  %


 


Platelet Count


 212 


 


 


 


 140-450


10^3/uL


 


Mean Platelet Volume 7.2     6.9-10.8  fL


 


Neutrophils (%) (Auto) 55.6     37.0-80.0  %


 


Lymphocytes (%) (Auto) 28.6     10.0-50.0  %


 


Monocytes (%) (Auto) 15.3 H    0.0-12.0  %


 


Eosinophils (%) (Auto) 0.1     0.0-7.0  %


 


Basophils (%) (Auto) 0.4     0.0-2.0  %


 


Neutrophils # (Auto)


 3.1 


 


 


 


 1.6-8.6  10


^3/uL


 


Lymphocytes # (Auto)


 1.6 


 


 


 


 0.4-5.4  10


^3/uL


 


Monocytes # (Auto) 0.9     0-1.3  10 ^3/uL


 


Eosinophils # (Auto) 0     0-0.8  10 ^3/uL


 


Basophils # (Auto) 0     0-0.2  10 ^3/uL


 


Nucleated Red Blood Cells 0.2      %


 


Sodium Level 140     136-145  mmol/L


 


Potassium Level 4.1     3.5-5.1  mmol/L


 


Chloride Level 105       mmol/L


 


Carbon Dioxide Level 30     20-31  mmol/L


 


Anion Gap 5     5-15  


 


Blood Urea Nitrogen 20     9-23  mg/dL


 


Creatinine


 0.75 


 


 


 


 0.550-1.02


mg/dL


 


Glomerular Filtration Rate


Calc 90 


 


 


 


 >90  mL/min





 


BUN/Creatinine Ratio 26.7 H    10.0-20.0  


 


Serum Glucose 92       mg/dL


 


Calcium Level 9.4     8.7-10.4  mg/dL


 


Total Bilirubin  0.2    0.2-1.0  mg/dL


 


Aspartate Amino Transferase


(AST) 


 21 


 


 


 13-40  U/L





 


Alanine Aminotransferase (ALT)  11    7-40  U/L


 


Alkaline Phosphatase  63      U/L


 


Total Protein  6.4    5.7-8.2  g/dL


 


Albumin  4.1    3.2-4.8  g/dL


 


Iron Level   19 L    ug/dL


 


Total Iron Binding Capacity   335   250-425  ug/dL


 


Percent Iron Saturation   5.7 L  15-50  %


 


Ferritin   84.7     ng/mL


 


Erythrocyte Sedimentation Rate    14  0-20  mm/hr


 


C-Reactive Protein High


Sensitivity 


 


 


 7.79 H


 <1.0  mg/dL





 


Lipase    51  12-53  U/L


 


Test


 1/1/25


17:30 1/1/25


17:13 1/1/25


15:31 1/1/25


15:29 Range/Units


 


 


Troponin I High Sensitivity 16     </=34  ng/L


 


Lactic Acid Level  0.9    0.4-2.0  mmol/L


 


Influenza Type A Antigen   Negative   Negative  


 


Influenza Type B Antigen   Negative   Negative  


 


SARS-CoV-2 Antigen (Rapid)   Negative   NEGATIVE  


 


Urine Color    Straw  Yellow  


 


Urine Clarity    Clear  Clear  


 


Urine pH    5.5  5.0-9.0  


 


Urine Specific Gravity    1.009  1.001-1.035  


 


Urine Protein    Negative  Negative  


 


Urine Ketones    Negative  Negative  


 


Urine Blood    Negative  Negative  /uL


 


Urine Nitrite    Negative  Negative  


 


Urine Bilirubin    Negative  Negative  


 


Urine Urobilinogen    Normal  Negative  mg/dL


 


Urine Leukocyte Esterase    Negative  Negative  /uL


 


Urine RBC    None seen  0 - 4  /hpf


 


Urine WBC    <1  0 - 5  /hpf


 


Urine Squamous Epithelial


Cells 


 


 


 Few 


 <5  /hpf





 


Urine Bacteria    None seen  None Seen  /hpf


 


Urine Hyaline Casts    Few  0 - 2  /lpf


 


Urine Glucose    Normal  Normal  mg/dL


 


Test


 1/1/25


14:33 


 


 


 Range/Units


 


 


B-Type Natriuretic Peptide 188.82     0-100  pg/mL








                                  Microbiology








 Date/Time


Source Procedure


Growth Status





 


 1/2/25 13:35


Sputum Gram Stain - Final


 Resulted


 


 1/2/25 13:35


Sputum Respiratory Culture - Preliminary


 Resulted


 


 1/2/25 07:28


Nose MRSA Screen - Final


 Complete


 


 1/1/25 14:30


Blood Blood Culture - Preliminary


NO GROWTH AFTER 48 HOURS OF INCUBATION. Resulted











Assessment


Impression:


Acute COPD exacerbation


Chronic hypoxic respiratory failure


Dependence on supplemental oxygen


Hx of nicotine dependence


Obstructive sleep apnea


Morbid obesity BMI 61.9





Events:


Supplemental oxygen


Remains on 3 liters/minute via nasal cannula


Blood cultures no growth after 72 hours.  


Continue steroids 


Continue antibiotics 


Continue bronchodilators and Pulmicort 





Monitor hemoglobin 


Iron supplementation 


Antitussive as needed 


Rest of plan as outlined below





Plan:


Supplemental oxygen 3 LPM NC


Titrate to keep O2 sats above 92%.


Taper O2 as tolerated.





Continue bronchodilators/Pulmicort


Continue antibiotics


Continue steroids -prednisone. Wheezing.


Incentive spirometry





IV fluid hydration


Monitor renal function.


Monitor electrolytes. Supplement as necessary.


Monitor ins and outs.





DVT prophylaxis.





Prognosis: Poor given patient's multiple co-morbidities.





Rest of plan per hospitalist and other consultants.





Thank you, Dr. Nguyen, for allowing me to participate in this patient's care.


Further recommendations will depend on the patient's clinical course.


Please do not hesitate to contact me if you have any questions or concerns.





This medical document was created using an electronic medical record system with

Dragon computerized dictation system. Although these documentations are being 

carefully reviewed, there may still be some phonetic and typographical changes. 

The errors are purely typographical, due to imperfection on the software 

program, and do not reflect any compromise in the patient's medical care.


Plan discussed with:  Patient, Other (JOVITA Jaimes/MD )











GARTH PIERRE MD              Jose 3, 2025 23:34

## 2025-01-03 NOTE — DVHNC2
Procedure


-


Biotronik interrogation revealed: 





Battery status:MOS2


Remaining battery capacity:  31%


DDD-CLS:  70/130


Sensing amplitude:  A 3.3/RV 12.6 mV


Pacing threshold:  A 0.8/RV 0.7 volts


Pacing impedance:  A 581/ Ohms


Shock impedance:  RV 91 Ohms


Pacing in a/V:  87/1% 


No VF/VT 





Normal functioning ICD











CELY TELLO MD                 Jose 3, 2025 09:22

## 2025-01-03 NOTE — DVHPN2
Progress Note - Dictate


Date Seen:  Jose 3, 2025


Medical Necessity Reason


Pt with a Central, PICC or Fol:  No


vital signs





                                   Vital Sign








  Date Time  Temp Pulse Resp B/P (MAP) Pulse Ox O2 Delivery O2 Flow Rate FiO2


 


1/3/25 01:00 98.3 84 17 117/71 (86) 90   





 98.3       


 


1/3/25 00:07      Nasal Cannula 3.0 


 


1/3/25 00:07        32














                           Total Intake and Output   


 


 1/2/25 1/2/25 1/3/25





 15:00 23:00 07:00


 


Intake Total  600 ml 


 


Balance  600 ml 








medications





                               Current Medications








 Medications  Dose


 Ordered  Sig/Prabha


 Route  Start Time


 Stop Time Status Last Admin


Dose Admin


 


 Nitroglycerin  0.4 mg  Q5MINP  PRN


 SL  1/1/25 22:00


     





 


 Morphine Sulfate  2 mg  Q30M  PRN


 IV  1/1/25 22:00


     





 


 Levofloxacin/


 Dextrose  150 ml @ 


 100 mls/hr  DAILY


 IV  1/2/25 10:00


    1/2/25 09:36


100 MLS/HR


 


 Albuterol  2.5 mg  Q6HR


 NEB  1/2/25 00:00


    1/3/25 00:07


2.5 MG


 


 Ipratropium


 Bromide  0.5 mg  Q6HR


 NEB  1/2/25 00:00


    1/3/25 00:07


0.5 MG


 


 Potassium Chloride  10 meq  DAILY


 PO  1/2/25 10:00


    1/2/25 09:36


10 MEQ


 


 Aspirin  81 mg  DAILY


 PO  1/2/25 10:00


    1/2/25 09:36


81 MG


 


 Acetaminophen/


 Hydrocodone Bitart  1 tab  Q6HP  PRN


 PO  1/1/25 22:15


    1/3/25 00:59


1 TAB


 


 Atorvastatin


 Calcium  40 mg  HS


 PO  1/2/25 22:00


    1/2/25 21:04


40 MG


 


 Famotidine  20 mg  DAILY


 IV  1/2/25 10:00


    1/2/25 10:26


20 MG


 


 Guaifenesin  200 mg  Q4HP  PRN


 PO  1/2/25 13:00


    1/2/25 15:45


200 MG


 


 Apixaban  5 mg  BID


 PO  1/2/25 22:00


    1/2/25 21:05


5 MG








laboratory and microbiology


                                Laboratory Tests


1/3/25 06:26

















Test


 1/3/25


06:26 Range/Units


 


 


Serum Glucose 92    mg/dL








Assessment/Plan


Patient is a 64-year-old female who presented with few days of worsening 

shortness of breath.  She mentions that she got sick from her grand children who

were sick also.  She also mentions that she had an ammonia around Thanksgiving. 

Cardiology is involved for cardiac aspects of care.  Patient is known to our 

practice from outside on before.  Patient does have history of systolic heart 

failure for which has ICD (Biotronik).  History also includes paroxysmal AFib 

for which she is on Eliquis.  She denies palpitations.  She does complain of 

pleuritic type chest pain during coughing.  She did have some coryza.  





Not in acute distress.  Not using accessory muscles of breathing.  Mucosa is 

pink and wet.  No carotid bruit.  No JVD.  Chest:  Scattered rhonchi in the 

lungs is heard.  Cardiac:  Regular, no thrill.  Systolic murmur in the apex is 

heard.  Abdomen is soft.  There is no gross mass/hepatomegaly.  Extremities 

reveal 1+ edema bilateral.  Dorsalis pedis is 2+ bilateral.





Past medical history includes hypertension, hyperlipidemia, COPD, systolic heart

failure, paroxysmal AFib, status post ICD (Biotronik) implantation, questionable

coronary artery disease, obstructive sleep apnea, peripheral artery disease 

(history of right subclavian artery stenosis), diverticulosis, fatty liver, type

2 pulmonary hypertension, history of congenital heart disease (for which had 

surgery in infancy) and status post appendectomy.  She stopped methamphetamine 

many years ago.  She stopped alcohol 2 years ago.  She stopped cigarette smoking

years ago.  Denies active marijuana use


Echocardiogram of November 2022 had reported dilated 4 chambers, ejection 

fraction of 30-35%, pseudonormal LV diastolic filling, moderate MR, mild TR, 

pacing wire in right-sided chambers and right ventricular systolic pressure of 

36 mm Hg


Echocardiogram of April 2024 (performed in the office) had reported improved 

ejection fraction and moderate MR





Creatinine:  0.82 - 0.98 - 1.08 - 0.90 - 0.75


Potassium:  4.0 - 3.8 - 3.6 - 3.6 - 4.1


Troponin (high sensitive):  12 -15 - 16 


BNP: 188.82





Chest x-ray reported (images were reviewed by self): IMPRESSION: 1. Moderate 

cardiomegaly, stable 2. Findings compatible with CHF pattern, slightly improved 

since the prior study 





CT of chest revealed: Findings are suggestive of multifocal atypical infection 

with more focal consolidation developing in the left lower lobe. Cardiomegaly 

and likely chronic CHF.





EKG revealed sinus rhythm, IVCD with nonspecific ST changes





Tele reveals sinus rhythm and occasions of atrial fibrillation with RVR





Echocardiogram reported: Left ventricle:  Dilated left ventricle with reduced 

systolic function.  LVEF was around 30%.  Diffuse hypokinesis of left ventricle 

was seen. Right ventricle was mildly dilated with reduced systolic function.  

Both atria were dilated.  Pacing wire was seen in the right-sided chambers.  

Aortic valve was trileaflet.  There was no aortic insufficiency/stenosis.  There

was mild-to-moderate mitral/tricuspid regurgitation.  There was mild pulmonary 

valve insufficiency.  Right ventricular systolic pressure was assessed around 40

mm Hg.  There was no pericardial effusion.





[employer] interrogation revealed: Battery status:MOS2; Remaining battery 

capacity:  31%; DDD-CLS:  70/130; Sensing amplitude:  A 3.3/RV 12.6 mV; Pacing 

threshold:  A 0.8/RV 0.7 volts; Pacing impedance:  A 581/ Ohms; Shock 

impedance:  RV 91 Ohms; Pacing in a/V:  87/1%; No VF/VT; Normal functioning ICD





Patient is a 64-year-old female who presented with worsening shortness of breath

and acute respiratory failure.  Presentation is in favor of 

bronchitis/pneumonia.  COPD exacerbation could have contributed to the clinical 

picture.  Patient does have history of systolic heart failure and component of 

acute on chronic systolic heart failure secondary to intercurrent disease could 

have contributed to the clinical picture.  Patient did have some sick contact 

which could have contributed to the clinical picture.  It is of note that the 

patient does have substance abuse but stopped many years ago.  Patient also has 

had congenital heart disease for which had surgery during infancy.  Patient is 

also obese which could have contributed to the clinical picture 


Acute respiratory failure 


Pneumonia, community-acquired 


COPD exacerbation 


Acute on chronic systolic heart failure


Congenital heart disease 


Hypertension


Hyperlipidemia 


History of fatty liver 


Peripheral artery disease 


Obstructive sleep apnea 


Morbid obesity





Cardiac suggestion for management:





Manage on telemetry


Gentle diuresis is suggested


Follow-up electrolytes and kidney function tests and correct abnormalities.  

Keep potassium above 4 and magnesium above 2





Pulmonary evaluation for management of COPD exacerbation is suggested 





Management of pneumonia (antibiotics...) as per primary team/Pulmonary





Long-term continuation of full anticoagulation (on Eliquis) is suggested


Guideline directed medical therapy for systolic heart failure 


Add Entresto





Further evaluation and management depends on the above and clinical course 





A total of 55 minutes was spent reviewing the patient record, examining the 

patient, making a diagnostic and therapeutic plan, discussing this plan with 

medical personnel, following up on diagnostic studies and following the patient 

for clinical stability excluding any and all procedures.  At least 50% of this 

time was spent in direct, face-to-face contact.





Thank you for allowing me to participate in this patient's care.


Further recommendations will depend on patient's clinical course.


Please do not hesitate to contact me if you have any questions or concerns.





This medical document was created using electronic medical record system with 

[employer] computerized dictation system.  Although this document has been carefully

reviewed, there may still be some phonetic and typographical errors.  These 

areas are purely typographical due to the imperfection of the software programs,

and do not reflect any compromise in the patient's medical care.


Plan discussed with:  Patient, Other (nurse)











CELY TELLO MD                 Jose 3, 2025 07:41

## 2025-01-03 NOTE — DVHPNRES
Progress Note


Date Seen:  Jose 3, 2025


Resident Creating Document:  ARMAAN PONCE RESIDENT


Medical Necessity Reason


Pt with a Central, PICC or Fol:  No





Subjective


Review of Systems





Patient is a 64-year-old female with a past medical history of heart failure 

with reduced ejection fraction, atrial fibrillation, COPD, coronary artery 

disease , congenital heart disease s/p surgery at 10 months of age came to the 

ED with a chief complaint of worsening shortness of breath for about a week.  

Patient was admitted to the hospital 1 month ago for a similar complaint of 

shortness of breath was discharged on oral antibiotic azithromycin.  Patient 

reported she was feeling well since about a week ago when she went to see her 

daughter where her grand kids were sick with flu-like symptoms following which 

she also developed cold, congestion, dry cough, sore throat, fever and headache 

and subsequently started to feel short of breath.  Patient reports to be using 

oxygen at home 2 L, for the last 7 years usually at night with the CPAP machine 

as she reports severe sleep apnea





Patient examined at bedside. patient also reported mild chest tenderness that 

exacerbates on palpation on the left side, pleuritic type chest pain also during

coughing. 


Patient will continue on telemetry monitor .  Continue on current IV antibiotic 

therapy, and oxygen supplementation , currently on 3 L via nasal cannula.


Patient was evaluated by Cardiology, patient will need to continue on Eliquis 5 

mg b.i.d., echo is current pending.  


Pacemaker interrogation is unremarkable


Monitor electrolytes keep potassium above 4 and magnesium above 2. 


Recent low blood pressure episode, Entresto and Lasix were hold.


Pending pulmonology consultation 


Cardiology on board.


Patient reports:  No new complaints





Objective


vital signs





                                   Vital Sign








  Date Time  Temp Pulse Resp B/P (MAP) Pulse Ox O2 Delivery O2 Flow Rate FiO2


 


1/3/25 18:43     94 Nasal Cannula* 3 32


 


1/3/25 18:42  91 20     


 


1/3/25 18:00    84/55    


 


1/3/25 17:06 98.3       





 98.3       














                           Total Intake and Output   


 


 1/2/25 1/2/25 1/3/25





 15:00 23:00 07:00


 


Intake Total  600 ml 150 ml


 


Balance  600 ml 150 ml








medications





                               Current Medications








 Medications  Dose


 Ordered  Sig/Prabha


 Route  Start Time


 Stop Time Status Last Admin


Dose Admin


 


 Nitroglycerin  0.4 mg  Q5MINP  PRN


 SL  1/1/25 22:00


     





 


 Morphine Sulfate  2 mg  Q30M  PRN


 IV  1/1/25 22:00


     





 


 Levofloxacin/


 Dextrose  150 ml @ 


 100 mls/hr  DAILY


 IV  1/2/25 10:00


    1/3/25 10:03


100 MLS/HR


 


 Albuterol  2.5 mg  Q6HR


 NEB  1/2/25 00:00


    1/3/25 18:41


2.5 MG


 


 Ipratropium


 Bromide  0.5 mg  Q6HR


 NEB  1/2/25 00:00


    1/3/25 18:41


0.5 MG


 


 Potassium Chloride  10 meq  DAILY


 PO  1/2/25 10:00


    1/3/25 09:59


10 MEQ


 


 Aspirin  81 mg  DAILY


 PO  1/2/25 10:00


    1/3/25 10:00


81 MG


 


 Atorvastatin


 Calcium  40 mg  HS


 PO  1/2/25 22:00


    1/2/25 21:04


40 MG


 


 Famotidine  20 mg  DAILY


 IV  1/2/25 10:00


    1/3/25 09:59


20 MG


 


 Guaifenesin  200 mg  Q4HP  PRN


 PO  1/2/25 13:00


    1/2/25 15:45


200 MG


 


 Apixaban  5 mg  BID


 PO  1/2/25 22:00


    1/3/25 10:00


5 MG


 


 Sacubitril/


 Valsartan  1 tab  BID


 PO  1/3/25 10:00


   Hold 1/3/25 09:59


1 TAB


 


 Iron Sucrose  110 ml @ 


 110 mls/hr  DAILY@1200


 IV  1/4/25 12:00


 1/8/25 12:59   





 


 Furosemide  40 mg  BIDD


 IV  1/3/25 18:00


     





 


 Ketorolac


 Tromethamine  15 mg  Q6HPRN  PRN


 IV  1/3/25 15:45


 1/8/25 15:44  1/3/25 16:42


15 MG








Examination:  GENERAL:Normal, HEENT:Normal, NECK:Normal, LUNGS:Abnormal, 

CVS:Abnormal, ABDOMEN:Normal, MSK:Normal, SKIN:Normal, NEURO:Normal, :Normal


laboratory and microbiology


                                Laboratory Tests


1/3/25 06:26

















Test


 1/3/25


06:26 Range/Units


 


 


Serum Glucose 92    mg/dL








                                  Microbiology








 Date/Time


Source Procedure


Growth Status





 


 1/2/25 13:35


Sputum Gram Stain - Final


 Resulted


 


 1/2/25 13:35


Sputum Respiratory Culture - Preliminary


 Resulted


 


 1/2/25 07:28


Nose MRSA Screen - Final


 Complete


 


 1/1/25 14:30


Blood Blood Culture - Preliminary


NO GROWTH AFTER 48 HOURS OF INCUBATION. Resulted











Problem List/Assessment/Plan


Problem List/Assessment/Plan


# acute hypoxic respiratory failure likely multifactorial due to acute on 

chronic systolic heart failure ,? CAP, COPD exacerbation


# complicated post viral pneumonia likely due to Gram+/-versus atypical bacteria


#multifocal atypical infection with more focal consolidation developing in the 

left lower lobe.


- on 3 L oxygen via nasal cannula


- chest x-ray shows bilateral opacities with the obstruction of the left CP 

angle, increased pulmonary vascular marking, interstitial pulmonary edema


- on levofloxacin 750 mg q.d. IV


- echo pending, previous echo in 2022 shows 30-35% EF, concentric LVH, dilated 

LV, diffuse hypokinesis of left ventricle


- Lasix 40 mg IV Daily, on hold


- Entresto on hold





# paroxysmal atrial fibrillation


- Telemetry


- currently heart rate is controlled


- Eliquis 5 mg b.i.d.


- Echo 30% ejection fraction





# acute chest pain, ruled out ACS


- EKG revealed sinus rhythm, IVCD with nonspecific ST changes


- Tele reveals sinus rhythm and occasions of atrial fibrillation with RVR


- troponins are under normal limits


- on aspirin 81 mg and atorvastatin 40 mg


- pacemaker interrogation unremarkable


- cardio on board





# obstructive sleep apnea


- BiPAP at night





# hypokalemia


- adequately replaced


- on potassium 10 mEq daily


- Follow-up electrolytes and kidney function tests and correct abnormalities.  

Keep potassium above 4 and magnesium above 2





# microcytic hypochromic anemia


- iron panel, ferritin pending


- monitor H&H





#Obesity


- lifestyle modification counseling, dietary habits counseling.





Case discussed with Dr. Hess 


Goals of care discussed with the patient for 36 minutes 


Code status:  Full code





Plan discussed with:  Patient





My Orders


My Orders





                       Orders - ARMAAN PONCE RESIDENT








Procedure Category Date Status





  Time 


 


Iron Sucrose Complex PHA 1/4/25 In Process





 (Venofer)  12:00 


 


Furosemide Injection PHA 1/3/25 In Process





 (Lasix Injection)  18:00 


 


Ketorolac Injection PHA 1/3/25 In Process





 (Toradol Injection)  15:45 


 


Strict I & O JOIE 1/3/25 In Process





  15:40 


 


*Consult CONS 1/3/25 Transmitted





/  15:45 











Date of Service:  Jose 3, 2025


Billing Provider:  TATIANNA RILEY MD


Common Visit Codes:  11938-GJCCSOCNIX INP/OBS CARE(HIGH)











ARMAAN PONCE RESIDENT       Jose 3, 2025 20:10


TATIANNA RILEY MD                Jan 6, 2025 08:59

## 2025-01-03 NOTE — DVHSR
--------------- APPROVED REPORT --------------





EXAM: Two-dimensional and M-mode echocardiogram with Doppler and color Doppler.



Blood Pressure: 115/72 mmHg



INDICATION

Dyspnea 



RISK FACTORS

Height: 5'3", Weight: 161



DIMENSIONS 

LVDd5.7 (3.8-5.7cm)LA (2D)4.8 (1.9-4.0cm)Aortic Root (2.0-3.7cm)

LVDs5.1 (2.5-4.0cm)LA (MM) (1.9-4.0cm)Aortic Cusp Exc (1.5-2.0cm)

EF (%) 23.0 (55-70%)Rt. Atrium (1.9-4.0cm)Asc. Aorta cm

IVSd1.0 (0.7-1.1cm)RV (D) (1.8-2.4cm)

PWd0.8 (0.7-1.1cm)



Mitral Valve

MitralMitral Stenosis

E/A ratio0.02D MVAcm2



Aortic Valve

Aortic ValveAortic Stenosis

V10.89m/Annabel Mean GR.3mmHg

V21.05m/Annabel Peak GR.4mmHg

LVOT Diameter2.1 (1.8-2.4cm)Doppler AVA2.93cm2



Pulmonic Valve

V21.08m/s



Tricuspid Valve

TR Velocity2.71m/s

[id number]



 Other Information 

Quality : Technically LimitedRhythm : 

Technically limited study due to  body habitus and coughing.



Conclusion

Left ventricle:  Dilated left ventricle with reduced systolic function.  LVEF was around 30%.  Diffus
e hypokinesis of left ventricle was seen.  

Right ventricle was mildly dilated with reduced systolic function.  Both atria were dilated.  Pacing 
wire was seen in the right-sided chambers.  

Aortic valve was trileaflet.  There was no aortic insufficiency/stenosis.  There was mild-to-moderate
 mitral/tricuspid regurgitation.  There was mild pulmonary valve insufficiency.  

Right ventricular systolic pressure was assessed around 40 mm Hg.  There was no pericardial effusion.

## 2025-01-04 VITALS — OXYGEN SATURATION: 95 % | HEART RATE: 110 BPM

## 2025-01-04 VITALS
TEMPERATURE: 98 F | DIASTOLIC BLOOD PRESSURE: 49 MMHG | HEART RATE: 85 BPM | OXYGEN SATURATION: 96 % | RESPIRATION RATE: 16 BRPM | SYSTOLIC BLOOD PRESSURE: 101 MMHG

## 2025-01-04 VITALS
RESPIRATION RATE: 20 BRPM | SYSTOLIC BLOOD PRESSURE: 115 MMHG | DIASTOLIC BLOOD PRESSURE: 81 MMHG | OXYGEN SATURATION: 93 % | TEMPERATURE: 97.6 F | HEART RATE: 107 BPM

## 2025-01-04 VITALS — HEART RATE: 123 BPM

## 2025-01-04 VITALS
TEMPERATURE: 97.5 F | HEART RATE: 106 BPM | SYSTOLIC BLOOD PRESSURE: 105 MMHG | RESPIRATION RATE: 18 BRPM | OXYGEN SATURATION: 96 % | DIASTOLIC BLOOD PRESSURE: 55 MMHG

## 2025-01-04 VITALS
RESPIRATION RATE: 19 BRPM | HEART RATE: 119 BPM | DIASTOLIC BLOOD PRESSURE: 52 MMHG | OXYGEN SATURATION: 92 % | TEMPERATURE: 97.6 F | SYSTOLIC BLOOD PRESSURE: 107 MMHG

## 2025-01-04 VITALS — OXYGEN SATURATION: 96 %

## 2025-01-04 VITALS
RESPIRATION RATE: 16 BRPM | OXYGEN SATURATION: 96 % | HEART RATE: 85 BPM | SYSTOLIC BLOOD PRESSURE: 101 MMHG | DIASTOLIC BLOOD PRESSURE: 49 MMHG

## 2025-01-04 VITALS — OXYGEN SATURATION: 92 % | HEART RATE: 100 BPM | RESPIRATION RATE: 20 BRPM

## 2025-01-04 VITALS — RESPIRATION RATE: 20 BRPM | OXYGEN SATURATION: 96 % | HEART RATE: 95 BPM

## 2025-01-04 VITALS — RESPIRATION RATE: 16 BRPM | OXYGEN SATURATION: 97 % | HEART RATE: 96 BPM

## 2025-01-04 VITALS
RESPIRATION RATE: 21 BRPM | HEART RATE: 90 BPM | OXYGEN SATURATION: 100 % | DIASTOLIC BLOOD PRESSURE: 46 MMHG | SYSTOLIC BLOOD PRESSURE: 94 MMHG

## 2025-01-04 VITALS
TEMPERATURE: 97.6 F | RESPIRATION RATE: 21 BRPM | DIASTOLIC BLOOD PRESSURE: 62 MMHG | SYSTOLIC BLOOD PRESSURE: 109 MMHG | HEART RATE: 111 BPM | OXYGEN SATURATION: 94 %

## 2025-01-04 VITALS — HEART RATE: 102 BPM | OXYGEN SATURATION: 94 % | RESPIRATION RATE: 22 BRPM

## 2025-01-04 VITALS — OXYGEN SATURATION: 95 % | HEART RATE: 100 BPM | RESPIRATION RATE: 20 BRPM

## 2025-01-04 VITALS — HEART RATE: 94 BPM | OXYGEN SATURATION: 99 % | RESPIRATION RATE: 16 BRPM

## 2025-01-04 VITALS — HEART RATE: 105 BPM | RESPIRATION RATE: 22 BRPM | OXYGEN SATURATION: 96 %

## 2025-01-04 VITALS — OXYGEN SATURATION: 96 % | HEART RATE: 85 BPM | RESPIRATION RATE: 16 BRPM

## 2025-01-04 VITALS — RESPIRATION RATE: 18 BRPM | HEART RATE: 101 BPM | OXYGEN SATURATION: 98 %

## 2025-01-04 LAB
ANION GAP SERPL CALCULATED.3IONS-SCNC: 6 MMOL/L (ref 5–15)
BUN SERPL-MCNC: 13 MG/DL (ref 9–23)
BUN/CREAT SERPL: 20.3 (ref 10–20)
CALCIUM SERPL-MCNC: 9.2 MG/DL (ref 8.7–10.4)
CHLORIDE SERPL-SCNC: 106 MMOL/L (ref 98–107)
CO2 SERPL-SCNC: 30 MMOL/L (ref 20–31)
GLUCOSE SERPL-MCNC: 99 MG/DL (ref 74–106)
HCT VFR BLD AUTO: 39.9 % (ref 36–46)
HGB BLD-MCNC: 12.8 G/DL (ref 12.2–16.2)
MCH RBC QN AUTO: 23.3 PG (ref 28–32)
MCV RBC AUTO: 72.9 FL (ref 80–100)
NRBC BLD QL AUTO: 0.1 %
POTASSIUM SERPL-SCNC: 3.9 MMOL/L (ref 3.5–5.1)
SODIUM SERPL-SCNC: 142 MMOL/L (ref 136–145)

## 2025-01-04 PROCEDURE — 5A09357 ASSISTANCE WITH RESPIRATORY VENTILATION, LESS THAN 24 CONSECUTIVE HOURS, CONTINUOUS POSITIVE AIRWAY PRESSURE: ICD-10-PCS | Performed by: INTERNAL MEDICINE

## 2025-01-04 RX ADMIN — ZOLPIDEM TARTRATE PRN MG: 5 TABLET ORAL at 21:37

## 2025-01-04 RX ADMIN — SODIUM CHLORIDE SCH MLS/HR: 9 INJECTION, SOLUTION INTRAVENOUS at 12:05

## 2025-01-04 RX ADMIN — METHYLPREDNISOLONE SODIUM SUCCINATE ONE MG: 40 INJECTION, POWDER, FOR SOLUTION INTRAMUSCULAR; INTRAVENOUS at 14:00

## 2025-01-04 RX ADMIN — SPIRONOLACTONE SCH MG: 25 TABLET, FILM COATED ORAL at 11:57

## 2025-01-04 RX ADMIN — SACUBITRIL AND VALSARTAN SCH TAB: 24; 26 TABLET, FILM COATED ORAL at 10:00

## 2025-01-04 NOTE — DVHPN2
Progress Note - Dictate


Date Seen:  Jan 4, 2025


Medical Necessity Reason


Pt with a Central, PICC or Fol:  No


vital signs





                                   Vital Sign








  Date Time  Temp Pulse Resp B/P (MAP) Pulse Ox O2 Delivery O2 Flow Rate FiO2


 


1/4/25 09:00 98.0 85 16 101/49 (66) 96   





 98.0       


 


1/4/25 07:27      Nasal Cannula* 3 32














                           Total Intake and Output   


 


 1/3/25 1/3/25 1/4/25





 15:00 23:00 07:00


 


Intake Total  460 ml 450 ml


 


Output Total   300 ml


 


Balance  460 ml 150 ml








medications





                               Current Medications








 Medications  Dose


 Ordered  Sig/Prabha


 Route  Start Time


 Stop Time Status Last Admin


Dose Admin


 


 Nitroglycerin  0.4 mg  Q5MINP  PRN


 SL  1/1/25 22:00


     





 


 Morphine Sulfate  2 mg  Q30M  PRN


 IV  1/1/25 22:00


     





 


 Levofloxacin/


 Dextrose  150 ml @ 


 100 mls/hr  DAILY


 IV  1/2/25 10:00


    1/4/25 09:59


100 MLS/HR


 


 Albuterol  2.5 mg  Q6HR


 NEB  1/2/25 00:00


    1/4/25 07:27


2.5 MG


 


 Ipratropium


 Bromide  0.5 mg  Q6HR


 NEB  1/2/25 00:00


    1/4/25 07:27


0.5 MG


 


 Potassium Chloride  10 meq  DAILY


 PO  1/2/25 10:00


    1/4/25 10:00


10 MEQ


 


 Aspirin  81 mg  DAILY


 PO  1/2/25 10:00


    1/4/25 10:00


81 MG


 


 Atorvastatin


 Calcium  40 mg  HS


 PO  1/2/25 22:00


    1/3/25 21:32


40 MG


 


 Famotidine  20 mg  DAILY


 IV  1/2/25 10:00


    1/3/25 09:59


20 MG


 


 Guaifenesin  200 mg  Q4HP  PRN


 PO  1/2/25 13:00


    1/2/25 15:45


200 MG


 


 Apixaban  5 mg  BID


 PO  1/2/25 22:00


    1/4/25 10:00


5 MG


 


 Iron Sucrose  110 ml @ 


 110 mls/hr  DAILY@1200


 IV  1/4/25 12:00


 1/8/25 12:59   





 


 Furosemide  40 mg  BIDD


 IV  1/3/25 18:00


    1/4/25 05:40


40 MG


 


 Ketorolac


 Tromethamine  15 mg  Q6HPRN  PRN


 IV  1/3/25 15:45


 1/8/25 15:44  1/4/25 10:31


15 MG


 


 Sacubitril/


 Valsartan  1 tab  BID


 PO  1/4/25 10:00


    1/4/25 10:00


1 TAB








laboratory and microbiology


                                Laboratory Tests


1/4/25 07:22

















Test


 1/4/25


07:22 Range/Units


 


 


Serum Glucose 99    mg/dL








Assessment/Plan


Patient is a 64-year-old female who presented with few days of worsening 

shortness of breath.  She mentions that she got sick from her grand children who

were sick also.  She also mentions that she had an ammonia around Thanksgiving. 

Cardiology is involved for cardiac aspects of care.  Patient is known to our 

practice from outside on before.  Patient does have history of systolic heart 

failure for which has ICD (Biotronik).  History also includes paroxysmal AFib 

for which she is on Eliquis.  She denies palpitations.  She does complain of 

pleuritic type chest pain during coughing.  She did have some coryza.  





Not in acute distress.  Not using accessory muscles of breathing.  Mucosa is 

pink and wet.  No carotid bruit.  No JVD.  Chest:  Scattered rhonchi in the 

lungs is heard.  Cardiac:  Regular, no thrill.  Systolic murmur in the apex is 

heard.  Abdomen is soft.  There is no gross mass/hepatomegaly.  Extremities 

reveal 1+ edema bilateral.  Dorsalis pedis is 2+ bilateral.





Past medical history includes hypertension, hyperlipidemia, COPD, systolic heart

failure, paroxysmal AFib, status post ICD (Biotronik) implantation, questionable

coronary artery disease, obstructive sleep apnea, peripheral artery disease 

(history of right subclavian artery stenosis), diverticulosis, fatty liver, type

2 pulmonary hypertension, history of congenital heart disease (for which had 

surgery in infancy) and status post appendectomy.  She stopped methamphetamine 

many years ago.  She stopped alcohol 2 years ago.  She stopped cigarette smoking

years ago.  Denies active marijuana use


Echocardiogram of November 2022 had reported dilated 4 chambers, ejection 

fraction of 30-35%, pseudonormal LV diastolic filling, moderate MR, mild TR, 

pacing wire in right-sided chambers and right ventricular systolic pressure of 

36 mm Hg


Echocardiogram of April 2024 (performed in the office) had reported improved 

ejection fraction and moderate MR





Creatinine:  0.82 - 0.98 - 1.08 - 0.90 - 0.75 - 0.64


Potassium:  4.0 - 3.8 - 3.6 - 3.6 - 4.1 - 3.9


Troponin (high sensitive):  12 -15 - 16 


BNP: 188.82





Chest x-ray reported (images were reviewed by self): IMPRESSION: 1. Moderate 

cardiomegaly, stable 2. Findings compatible with CHF pattern, slightly improved 

since the prior study 





CT of chest revealed: Findings are suggestive of multifocal atypical infection 

with more focal consolidation developing in the left lower lobe. Cardiomegaly 

and likely chronic CHF.





EKG revealed sinus rhythm, IVCD with nonspecific ST changes





Tele reveals sinus rhythm and occasions of atrial fibrillation with RVR





Echocardiogram reported: Left ventricle:  Dilated left ventricle with reduced 

systolic function.  LVEF was around 30%.  Diffuse hypokinesis of left ventricle 

was seen. Right ventricle was mildly dilated with reduced systolic function.  

Both atria were dilated.  Pacing wire was seen in the right-sided chambers.  

Aortic valve was trileaflet.  There was no aortic insufficiency/stenosis.  There

was mild-to-moderate mitral/tricuspid regurgitation.  There was mild pulmonary 

valve insufficiency.  Right ventricular systolic pressure was assessed around 40

mm Hg.  There was no pericardial effusion.





ModiFaceroniAccent interrogation revealed: Battery status:MOS2; Remaining battery 

capacity:  31%; DDD-CLS:  70/130; Sensing amplitude:  A 3.3/RV 12.6 mV; Pacing 

threshold:  A 0.8/RV 0.7 volts; Pacing impedance:  A 581/ Ohms; Shock 

impedance:  RV 91 Ohms; Pacing in a/V:  87/1%; No VF/VT; Normal functioning ICD





Patient is a 64-year-old female who presented with worsening shortness of breath

and acute respiratory failure.  Presentation is in favor of 

bronchitis/pneumonia.  COPD exacerbation could have contributed to the clinical 

picture.  Patient does have history of systolic heart failure and component of 

acute on chronic systolic heart failure secondary to intercurrent disease could 

have contributed to the clinical picture.  Patient did have some sick contact 

which could have contributed to the clinical picture.  It is of note that the 

patient does have substance abuse but stopped many years ago.  Patient also has 

had congenital heart disease for which had surgery during infancy.  Patient is 

also obese which could have contributed to the clinical picture. Echo revealed 

reduced EF. Started on Entresto. Is evaluated by Pulmonary.  


Acute respiratory failure 


Pneumonia, community-acquired 


COPD exacerbation 


Acute on chronic systolic heart failure


Congenital heart disease 


Hypertension


Hyperlipidemia 


History of fatty liver 


Peripheral artery disease 


Obstructive sleep apnea 


Morbid obesity





Cardiac suggestion for management:





Manage on telemetry


Gentle diuresis is suggested


Follow-up electrolytes and kidney function tests and correct abnormalities.  

Keep potassium above 4 and magnesium above 2





Pulmonary evaluation for management of COPD exacerbation is suggested 





Management of pneumonia (antibiotics...) as per primary team/Pulmonary





Long-term continuation of full anticoagulation (on Eliquis) is suggested


Guideline directed medical therapy for systolic heart failure 


On Entresto


Add Aldactone





Further evaluation and management depends on the above and clinical course 





A total of 55 minutes was spent reviewing the patient record, examining the 

patient, making a diagnostic and therapeutic plan, discussing this plan with 

medical personnel, following up on diagnostic studies and following the patient 

for clinical stability excluding any and all procedures.  At least 50% of this 

time was spent in direct, face-to-face contact.





Thank you for allowing me to participate in this patient's care.


Further recommendations will depend on patient's clinical course.


Please do not hesitate to contact me if you have any questions or concerns.





This medical document was created using electronic medical record system with 

[employer] computerized dictation system.  Although this document has been carefully

reviewed, there may still be some phonetic and typographical errors.  These 

areas are purely typographical due to the imperfection of the software programs,

and do not reflect any compromise in the patient's medical care.


Plan discussed with:  Patient, Other (nurse)











CELY TELLO MD                 Jan 4, 2025 11:03

## 2025-01-04 NOTE — DVHPNRES
Progress Note


Date Seen:  Jan 4, 2025


Resident Creating Document:  BUDDY MARQUEZ RESIDENT


Medical Necessity Reason


Pt with a Central, PICC or Fol:  No





Subjective


Review of Systems


64-year-old female with a past medical history of heart failure with reduced 

ejection fraction, atrial fibrillation, COPD, coronary artery disease , 

congenital heart disease s/p surgery at 10 months of age came to the ED with a 

chief complaint of worsening shortness of breath for about a week.  Patient was 

admitted to the hospital 1 month ago for a similar complaint of shortness of 

breath was discharged on oral antibiotic azithromycin.  Patient reported she was

feeling well since about a week ago when she went to see her daughter where her 

grand kids were sick with flu-like symptoms following which she also developed 

cold, congestion, dry cough, sore throat, fever and headache and subsequently 

started to feel short of breath.  Patient reports to be using oxygen at home 2 

L, for the last 7 years usually at night with the CPAP machine as she reports 

severe sleep apnea





Patient examined at bedside. patient also reported mild chest tenderness that 

exacerbates on palpation on the left side, pleuritic type chest pain also during

coughing. 


Patient will continue on telemetry monitor .  Continue on current IV antibiotic 

therapy, and oxygen supplementation , currently on 3 L via nasal cannula.


Patient was evaluated by Cardiology, patient will need to continue on Eliquis 5 

mg b.i.d., echo is current pending.  


Pacemaker interrogation is unremarkable


Monitor electrolytes keep potassium above 4 and magnesium above 2. 


Recent low blood pressure episode, Entresto and Lasix were hold.


Pending pulmonology consultation 


Cardiology on board.





Objective


vital signs





                                   Vital Sign








  Date Time  Temp Pulse Resp B/P (MAP) Pulse Ox O2 Delivery O2 Flow Rate FiO2


 


1/4/25 13:00 97.5 106 18 105/55 (72) 96   





 97.5       


 


1/4/25 11:39      Nasal Cannula* 3 32














                           Total Intake and Output   


 


 1/3/25 1/3/25 1/4/25





 15:00 23:00 07:00


 


Intake Total  460 ml 450 ml


 


Output Total   300 ml


 


Balance  460 ml 150 ml








medications





                               Current Medications








 Medications  Dose


 Ordered  Sig/Prabha


 Route  Start Time


 Stop Time Status Last Admin


Dose Admin


 


 Nitroglycerin  0.4 mg  Q5MINP  PRN


 SL  1/1/25 22:00


     





 


 Morphine Sulfate  2 mg  Q30M  PRN


 IV  1/1/25 22:00


     





 


 Levofloxacin/


 Dextrose  150 ml @ 


 100 mls/hr  DAILY


 IV  1/2/25 10:00


    1/4/25 09:59


100 MLS/HR


 


 Albuterol  2.5 mg  Q6HR


 NEB  1/2/25 00:00


    1/4/25 11:39


2.5 MG


 


 Ipratropium


 Bromide  0.5 mg  Q6HR


 NEB  1/2/25 00:00


    1/4/25 11:39


0.5 MG


 


 Potassium Chloride  10 meq  DAILY


 PO  1/2/25 10:00


    1/4/25 10:00


10 MEQ


 


 Aspirin  81 mg  DAILY


 PO  1/2/25 10:00


    1/4/25 10:00


81 MG


 


 Atorvastatin


 Calcium  40 mg  HS


 PO  1/2/25 22:00


    1/3/25 21:32


40 MG


 


 Famotidine  20 mg  DAILY


 IV  1/2/25 10:00


    1/4/25 10:00


20 MG


 


 Guaifenesin  200 mg  Q4HP  PRN


 PO  1/2/25 13:00


    1/2/25 15:45


200 MG


 


 Apixaban  5 mg  BID


 PO  1/2/25 22:00


    1/4/25 10:00


5 MG


 


 Iron Sucrose  110 ml @ 


 110 mls/hr  DAILY@1200


 IV  1/4/25 12:00


 1/8/25 12:59  1/4/25 12:05


110 MLS/HR


 


 Furosemide  40 mg  BIDD


 IV  1/3/25 18:00


    1/4/25 05:40


40 MG


 


 Ketorolac


 Tromethamine  15 mg  Q6HPRN  PRN


 IV  1/3/25 15:45


 1/8/25 15:44  1/4/25 10:31


15 MG


 


 Sacubitril/


 Valsartan  1 tab  BID


 PO  1/4/25 10:00


    1/4/25 10:00


1 TAB


 


 Spironolactone  12.5 mg  DAILY


 PO  1/4/25 11:15


    1/4/25 11:57


12.5 MG


 


 Methylprednisolone


 Sodium Succinate  40 mg  DAILY


 IV  1/5/25 10:00


     





 


 Zolpidem Tartrate  5 mg  HSPRN  PRN


 PO  1/4/25 14:00


     











Examination


GENERAL:Normal, HEENT:Normal, NECK:Normal, LUNGS:Abnormal, CVS:Abnormal, 

ABDOMEN:Normal, MSK:Normal, SKIN:Normal, NEURO:Normal, :Normal


laboratory and microbiology


                                Laboratory Tests


1/4/25 07:22

















Test


 1/4/25


07:22 Range/Units


 


 


Serum Glucose 99    mg/dL








                                  Microbiology








 Date/Time


Source Procedure


Growth Status





 


 1/2/25 13:35


Sputum Gram Stain - Final


 Resulted


 


 1/2/25 13:35


Sputum Respiratory Culture - Preliminary


 Resulted


 


 1/2/25 07:28


Nose MRSA Screen - Final


 Complete


 


 1/1/25 14:30


Blood Blood Culture - Preliminary


NO GROWTH AFTER 72 HOURS OF INCUBATION. Resulted











Problem List/Assessment/Plan


Problem List/Assessment/Plan


# acute hypoxic respiratory failure likely multifactorial due to acute on 

chronic systolic heart failure ,? CAP, COPD exacerbation


# complicated post viral pneumonia likely due to Gram+/-versus atypical bacteria


#multifocal atypical infection with more focal consolidation developing in the 

left lower lobe.


- on 3 L oxygen via nasal cannula


- chest x-ray shows bilateral opacities with the obstruction of the left CP 

angle, increased pulmonary vascular marking, interstitial pulmonary edema


- on levofloxacin 750 mg q.d. IV


- echo pending, previous echo in 2022 shows 30-35% EF, concentric LVH, dilated 

LV, diffuse hypokinesis of left ventricle


- Lasix 40 mg IV Daily, on hold


- Entresto on hold





# paroxysmal atrial fibrillation


- Telemetry


- currently heart rate is controlled


- Eliquis 5 mg b.i.d.


- Echo 30% ejection fraction





# acute chest pain, ruled out ACS


- EKG revealed sinus rhythm, IVCD with nonspecific ST changes


- Tele reveals sinus rhythm and occasions of atrial fibrillation with RVR


- troponins are under normal limits


- on aspirin 81 mg and atorvastatin 40 mg


- pacemaker interrogation unremarkable


- cardio on board





# obstructive sleep apnea


- BiPAP at night





# hypokalemia


- adequately replaced


- on potassium 10 mEq daily


- Follow-up electrolytes and kidney function tests and correct abnormalities.  

Keep potassium above 4 and magnesium above 2





# microcytic hypochromic anemia


- iron panel, ferritin pending


- monitor H&H





#Obesity


- lifestyle modification counseling, dietary habits counseling.





Case discussed with Dr. Riley


Goals of care discussed with the patient for 26 minutes 


Code status:  Full code


Plan discussed with:  Patient





My Orders


My Orders





                        Orders - BUDDY MARQUEZ RESIDENT








Procedure Category Date Status





  Time 


 


Basic Metabolic Panel LAB 1/5/25 Verified





  04:00 


 


Complete Blood Count LAB 1/5/25 Verified





  04:00 


 


Methylprednisolone PHA 1/5/25 In Process





Sod Succ (Solu Medrol  10:00 


 


Zolpidem Tartrate PHA 1/4/25 In Process





 (Ambien)  14:00 











Date of Service:  Jan 4, 2025


Billing Provider:  TATIANNA RILEY MD


Common Visit Codes:  [id number] INP/OBS CARE(HIGH)











BUDDY MARQUEZ RESIDENT         Jan 4, 2025 16:26


TATIANNA RILEY MD                Jan 6, 2025 09:00

## 2025-01-05 VITALS
OXYGEN SATURATION: 98 % | HEART RATE: 115 BPM | SYSTOLIC BLOOD PRESSURE: 106 MMHG | DIASTOLIC BLOOD PRESSURE: 70 MMHG | TEMPERATURE: 97.8 F | RESPIRATION RATE: 19 BRPM

## 2025-01-05 VITALS
RESPIRATION RATE: 20 BRPM | OXYGEN SATURATION: 95 % | TEMPERATURE: 98 F | DIASTOLIC BLOOD PRESSURE: 72 MMHG | HEART RATE: 98 BPM | SYSTOLIC BLOOD PRESSURE: 122 MMHG

## 2025-01-05 VITALS — OXYGEN SATURATION: 97 % | HEART RATE: 106 BPM | RESPIRATION RATE: 20 BRPM

## 2025-01-05 VITALS — HEART RATE: 142 BPM

## 2025-01-05 VITALS
HEART RATE: 123 BPM | RESPIRATION RATE: 19 BRPM | OXYGEN SATURATION: 97 % | SYSTOLIC BLOOD PRESSURE: 123 MMHG | TEMPERATURE: 97.5 F | DIASTOLIC BLOOD PRESSURE: 63 MMHG

## 2025-01-05 VITALS
SYSTOLIC BLOOD PRESSURE: 118 MMHG | RESPIRATION RATE: 18 BRPM | DIASTOLIC BLOOD PRESSURE: 83 MMHG | TEMPERATURE: 97.4 F | OXYGEN SATURATION: 97 % | HEART RATE: 97 BPM

## 2025-01-05 VITALS — OXYGEN SATURATION: 98 % | RESPIRATION RATE: 18 BRPM | HEART RATE: 94 BPM

## 2025-01-05 VITALS
SYSTOLIC BLOOD PRESSURE: 100 MMHG | RESPIRATION RATE: 18 BRPM | TEMPERATURE: 97.5 F | HEART RATE: 111 BPM | DIASTOLIC BLOOD PRESSURE: 45 MMHG | OXYGEN SATURATION: 95 %

## 2025-01-05 VITALS — RESPIRATION RATE: 20 BRPM | HEART RATE: 108 BPM | OXYGEN SATURATION: 98 %

## 2025-01-05 VITALS — HEART RATE: 116 BPM | SYSTOLIC BLOOD PRESSURE: 122 MMHG | DIASTOLIC BLOOD PRESSURE: 72 MMHG | OXYGEN SATURATION: 98 %

## 2025-01-05 VITALS
TEMPERATURE: 97.8 F | HEART RATE: 116 BPM | RESPIRATION RATE: 19 BRPM | OXYGEN SATURATION: 93 % | SYSTOLIC BLOOD PRESSURE: 100 MMHG | DIASTOLIC BLOOD PRESSURE: 56 MMHG

## 2025-01-05 VITALS — RESPIRATION RATE: 18 BRPM | OXYGEN SATURATION: 94 % | HEART RATE: 95 BPM

## 2025-01-05 VITALS — OXYGEN SATURATION: 97 %

## 2025-01-05 VITALS — RESPIRATION RATE: 20 BRPM | OXYGEN SATURATION: 100 % | HEART RATE: 116 BPM

## 2025-01-05 VITALS — OXYGEN SATURATION: 100 %

## 2025-01-05 VITALS — HEART RATE: 100 BPM | OXYGEN SATURATION: 95 %

## 2025-01-05 VITALS — HEART RATE: 113 BPM

## 2025-01-05 LAB
ANION GAP SERPL CALCULATED.3IONS-SCNC: 5 MMOL/L (ref 5–15)
BUN SERPL-MCNC: 15 MG/DL (ref 9–23)
BUN/CREAT SERPL: 25.4 (ref 10–20)
CALCIUM SERPL-MCNC: 9.3 MG/DL (ref 8.7–10.4)
CHLORIDE SERPL-SCNC: 106 MMOL/L (ref 98–107)
CO2 SERPL-SCNC: 32 MMOL/L (ref 20–31)
GLUCOSE SERPL-MCNC: 129 MG/DL (ref 74–106)
HCT VFR BLD AUTO: 36.8 % (ref 36–46)
HGB BLD-MCNC: 11.8 G/DL (ref 12.2–16.2)
MCH RBC QN AUTO: 23.2 PG (ref 28–32)
MCV RBC AUTO: 72 FL (ref 80–100)
NRBC BLD QL AUTO: 0.1 %
POTASSIUM SERPL-SCNC: 3.9 MMOL/L (ref 3.5–5.1)
SODIUM SERPL-SCNC: 143 MMOL/L (ref 136–145)

## 2025-01-05 PROCEDURE — 5A09357 ASSISTANCE WITH RESPIRATORY VENTILATION, LESS THAN 24 CONSECUTIVE HOURS, CONTINUOUS POSITIVE AIRWAY PRESSURE: ICD-10-PCS | Performed by: INTERNAL MEDICINE

## 2025-01-05 RX ADMIN — CARVEDILOL SCH MG: 3.12 TABLET, FILM COATED ORAL at 22:14

## 2025-01-05 RX ADMIN — METHYLPREDNISOLONE SODIUM SUCCINATE SCH MG: 40 INJECTION, POWDER, FOR SOLUTION INTRAMUSCULAR; INTRAVENOUS at 09:45

## 2025-01-05 NOTE — DVHPN2
Progress Note - Dictate


Date Seen:  Jan 5, 2025


Medical Necessity Reason


Pt with a Central, PICC or Fol:  No


Subjective


Patient seen and examined at bedside.


Remains on supplemental oxygen


Overnight events reviewed.


vital signs





                                   Vital Sign








  Date Time  Temp Pulse Resp B/P (MAP) Pulse Ox O2 Delivery O2 Flow Rate FiO2


 


1/5/25 22:33  116  122/72 98 Facial BiPAP Mask  40


 


1/5/25 21:00 98.0  20     





 98.0       


 


1/5/25 20:00       3 














                           Total Intake and Output   


 


 1/4/25 1/4/25 1/5/25





 15:00 23:00 07:00


 


Intake Total 150 ml 800 ml 650 ml


 


Output Total  600 ml 


 


Balance 150 ml 200 ml 650 ml








medications





                               Current Medications








 Medications  Dose


 Ordered  Sig/Prabha


 Route  Start Time


 Stop Time Status Last Admin


Dose Admin


 


 Nitroglycerin  0.4 mg  Q5MINP  PRN


 SL  1/1/25 22:00


     





 


 Morphine Sulfate  2 mg  Q30M  PRN


 IV  1/1/25 22:00


     





 


 Levofloxacin/


 Dextrose  150 ml @ 


 100 mls/hr  DAILY


 IV  1/2/25 10:00


    1/5/25 09:32


100 MLS/HR


 


 Albuterol  2.5 mg  Q6HR


 NEB  1/2/25 00:00


    1/5/25 21:26


2.5 MG


 


 Ipratropium


 Bromide  0.5 mg  Q6HR


 NEB  1/2/25 00:00


    1/5/25 21:25


0.5 MG


 


 Potassium Chloride  10 meq  DAILY


 PO  1/2/25 10:00


    1/5/25 09:43


10 MEQ


 


 Aspirin  81 mg  DAILY


 PO  1/2/25 10:00


    1/5/25 09:41


81 MG


 


 Atorvastatin


 Calcium  40 mg  HS


 PO  1/2/25 22:00


    1/5/25 22:14


40 MG


 


 Famotidine  20 mg  DAILY


 IV  1/2/25 10:00


    1/5/25 09:38


20 MG


 


 Guaifenesin  200 mg  Q4HP  PRN


 PO  1/2/25 13:00


    1/2/25 15:45


200 MG


 


 Apixaban  5 mg  BID


 PO  1/2/25 22:00


    1/5/25 22:15


5 MG


 


 Iron Sucrose  110 ml @ 


 110 mls/hr  DAILY@1200


 IV  1/4/25 12:00


 1/8/25 12:59  1/5/25 13:40


110 MLS/HR


 


 Furosemide  40 mg  BIDD


 IV  1/3/25 18:00


    1/5/25 18:27


40 MG


 


 Ketorolac


 Tromethamine  15 mg  Q6HPRN  PRN


 IV  1/3/25 15:45


 1/8/25 15:44  1/5/25 16:02


15 MG


 


 Sacubitril/


 Valsartan  1 tab  BID


 PO  1/4/25 10:00


    1/5/25 22:13


1 TAB


 


 Spironolactone  12.5 mg  DAILY


 PO  1/4/25 11:15


    1/5/25 09:42


12.5 MG


 


 Methylprednisolone


 Sodium Succinate  40 mg  DAILY


 IV  1/5/25 10:00


    1/5/25 09:45


40 MG


 


 Zolpidem Tartrate  5 mg  HSPRN  PRN


 PO  1/4/25 14:00


    1/5/25 22:21


5 MG


 


 Carvedilol  3.125 mg  Q12HR


 PO  1/5/25 22:00


    1/5/25 22:14


3.125 MG








objective


Gen.: Patient lying in bed in no apparent distress. On supplemental oxygen.


Head: Normocephalic, atraumatic.


Eyes: EOMI/PERRLA.


Ears: Normal hearing. Normal anatomy.


Neck/trachea: Trachea midline, supple.


Nose: Normal external anatomy.


Mouth: Moist mucous membranes.


Chest: Decreased air entry bilaterally. No wheezing or rhonchi.


Cardiovascular: Positive S1, positive S2. Regular rate and rhythm.


Abdomen: Positive bowel sounds in all 4 quadrants. Soft, non-tender, non-

distended.


: Deferred.


Rectal: Deferred.


Skin: Warm, dry. Intact.


Extremities: 2+ radial pulses bilaterally. No lower extremity edema.


Neuro: Awake, alert, oriented x3. No gross motor or sensory deficits. Cranial 

nerves II through XII intact. Gait not assessed.


laboratory and microbiology


                                Laboratory Tests


1/5/25 07:00

















Test


 1/5/25


07:00 Range/Units


 


 


Serum Glucose 129 H   mg/dL








Assessment/Plan


Impression:


Acute COPD exacerbation


Chronic hypoxic respiratory failure


Dependence on supplemental oxygen


Hx of nicotine dependence


Obstructive sleep apnea


Morbid obesity BMI 61.9





Events:


Supplemental oxygen


Remains on 3 liters/minute via nasal cannula


Taper o2 as tolerated


BiPAP PRN.





Atrial flutter- follow up Cardiology recommendations.





Blood cultures, no growth after 72 hours.  


Sputum cultures showing normal oropharyngeal chano.


Continue steroids 


Continue antibiotics 


Continue bronchodilators


Antitussive as needed 





Monitor hemoglobin 


Iron supplementation 





Rest of plan as outlined below





Plan:


Supplemental oxygen 


Titrate to keep O2 sats above 92%.





Continue bronchodilators/Pulmicort


Continue antibiotics


Continue steroids -prednisone. Wheezing.


Incentive spirometry





IV fluid hydration


Monitor renal function.


Monitor electrolytes. Supplement as necessary.


Monitor ins and outs.





DVT prophylaxis.





Prognosis: Poor given patient's multiple co-morbidities.





Rest of plan per hospitalist and other consultants.





Thank you, Dr. Nguyen, for allowing me to participate in this patient's care.


Further recommendations will depend on the patient's clinical course.


Please do not hesitate to contact me if you have any questions or concerns.





This medical document was created using an electronic medical record system with

[employer]agon computerized dictation system. Although these documentations are being 

carefully reviewed, there may still be some phonetic and typographical changes. 

The errors are purely typographical, due to imperfection on the software 

program, and do not reflect any compromise in the patient's medical care.


Plan discussed with:  Patient, Other (JOVITA Jeronimo)











GARTH PIERRE MD              Jan 5, 2025 23:35

## 2025-01-05 NOTE — DVHPNRES
Progress Note


Date Seen:  Jan 5, 2025


Resident Creating Document:  ARMAAN PONCE RESIDENT


Medical Necessity Reason


Pt with a Central, PICC or Fol:  No





Subjective


Review of Systems


64-year-old female with a past medical history of heart failure with reduced 

ejection fraction, atrial fibrillation, COPD, coronary artery disease , 

congenital heart disease s/p surgery at 10 months of age came to the ED with a 

chief complaint of worsening shortness of breath for about a week.  Patient was 

admitted to the hospital 1 month ago for a similar complaint of shortness of 

breath was discharged on oral antibiotic azithromycin.  Patient reported she was

feeling well since about a week ago when she went to see her daughter where her 

grand kids were sick with flu-like symptoms following which she also developed 

cold, congestion, dry cough, sore throat, fever and headache and subsequently 

started to feel short of breath.  Patient reports to be using oxygen at home 2 

L, for the last 7 years usually at night with the CPAP machine as she reports 

severe sleep apnea





Patient examined at bedside. patient also reported mild chest tenderness that 

exacerbates on palpation on the left side, pleuritic type chest pain also during

coughing. 


Patient will continue on telemetry monitor .  Continue on current IV antibiotic 

therapy, and oxygen supplementation , currently on 3 L via nasal cannula.


Patient was evaluated by Cardiology, patient will need to continue on Eliquis 5 

mg b.i.d., echo is current pending.  


Pacemaker interrogation is unremarkable


Monitor electrolytes keep potassium above 4 and magnesium above 2. 


Recent low blood pressure episode, Entresto and Lasix were hold.


Pending pulmonology consultation 


Cardiology on board


-.Long-term continuation of full anticoagulation (on Eliquis) is suggested


- Guideline directed medical therapy for systolic heart failure 


- On Entresto


- On Aldactone


- Add Coreg





Objective


vital signs





                                   Vital Sign








  Date Time  Temp Pulse Resp B/P (MAP) Pulse Ox O2 Delivery O2 Flow Rate FiO2


 


1/5/25 20:00      Nasal Cannula* 3 32


 


1/5/25 18:27    131/69    


 


1/5/25 17:00 97.5 123 19  97   





 97.5       














                           Total Intake and Output   


 


 1/4/25 1/4/25 1/5/25





 15:00 23:00 07:00


 


Intake Total 150 ml 800 ml 650 ml


 


Output Total  600 ml 


 


Balance 150 ml 200 ml 650 ml








medications





                               Current Medications








 Medications  Dose


 Ordered  Sig/Prabha


 Route  Start Time


 Stop Time Status Last Admin


Dose Admin


 


 Nitroglycerin  0.4 mg  Q5MINP  PRN


 SL  1/1/25 22:00


     





 


 Morphine Sulfate  2 mg  Q30M  PRN


 IV  1/1/25 22:00


     





 


 Levofloxacin/


 Dextrose  150 ml @ 


 100 mls/hr  DAILY


 IV  1/2/25 10:00


    1/5/25 09:32


100 MLS/HR


 


 Albuterol  2.5 mg  Q6HR


 NEB  1/2/25 00:00


    1/5/25 12:58


2.5 MG


 


 Ipratropium


 Bromide  0.5 mg  Q6HR


 NEB  1/2/25 00:00


    1/5/25 12:58


0.5 MG


 


 Potassium Chloride  10 meq  DAILY


 PO  1/2/25 10:00


    1/5/25 09:43


10 MEQ


 


 Aspirin  81 mg  DAILY


 PO  1/2/25 10:00


    1/5/25 09:41


81 MG


 


 Atorvastatin


 Calcium  40 mg  HS


 PO  1/2/25 22:00


    1/4/25 21:37


40 MG


 


 Famotidine  20 mg  DAILY


 IV  1/2/25 10:00


    1/5/25 09:38


20 MG


 


 Guaifenesin  200 mg  Q4HP  PRN


 PO  1/2/25 13:00


    1/2/25 15:45


200 MG


 


 Apixaban  5 mg  BID


 PO  1/2/25 22:00


    1/5/25 09:43


5 MG


 


 Iron Sucrose  110 ml @ 


 110 mls/hr  DAILY@1200


 IV  1/4/25 12:00


 1/8/25 12:59  1/5/25 13:40


110 MLS/HR


 


 Furosemide  40 mg  BIDD


 IV  1/3/25 18:00


    1/5/25 18:27


40 MG


 


 Ketorolac


 Tromethamine  15 mg  Q6HPRN  PRN


 IV  1/3/25 15:45


 1/8/25 15:44  1/5/25 16:02


15 MG


 


 Sacubitril/


 Valsartan  1 tab  BID


 PO  1/4/25 10:00


    1/5/25 09:43


1 TAB


 


 Spironolactone  12.5 mg  DAILY


 PO  1/4/25 11:15


    1/5/25 09:42


12.5 MG


 


 Methylprednisolone


 Sodium Succinate  40 mg  DAILY


 IV  1/5/25 10:00


    1/5/25 09:45


40 MG


 


 Zolpidem Tartrate  5 mg  HSPRN  PRN


 PO  1/4/25 14:00


    1/4/25 21:37


5 MG


 


 Carvedilol  3.125 mg  Q12HR


 PO  1/5/25 22:00


     











Examination:  GENERAL:Normal, HEENT:Normal, NECK:Normal, LUNGS:Normal, 

CVS:Abnormal, ABDOMEN:Normal, MSK:Normal, SKIN:Normal, NEURO:Normal, :Normal


laboratory and microbiology


                                Laboratory Tests


1/5/25 07:00

















Test


 1/5/25


07:00 Range/Units


 


 


Serum Glucose 129 H   mg/dL








                                  Microbiology








 Date/Time


Source Procedure


Growth Status





 


 1/2/25 13:35


Sputum Gram Stain - Final


 Complete


 


 1/2/25 13:35


Sputum Respiratory Culture - Final


 Complete


 


 1/2/25 07:28


Nose MRSA Screen - Final


 Complete


 


 1/1/25 14:30


Blood Blood Culture - Preliminary


NO GROWTH AFTER 72 HOURS OF INCUBATION. Resulted











Problem List/Assessment/Plan


Problem List/Assessment/Plan


# acute hypoxic respiratory failure likely multifactorial due to acute on 

chronic systolic heart failure ,? CAP, COPD exacerbation


# complicated post viral pneumonia likely due to Gram+/-versus atypical bacteria


#multifocal atypical infection with more focal consolidation developing in the 

left lower lobe.


- on 3 L oxygen via nasal cannula


- chest x-ray shows bilateral opacities with the obstruction of the left CP 

angle, increased pulmonary vascular marking, interstitial pulmonary edema


- on levofloxacin 750 mg q.d. IV


- echo pending, previous echo in 2022 shows 30-35% EF, concentric LVH, dilated 

LV, diffuse hypokinesis of left ventricle


- Lasix 40 mg IV Daily, on hold


- Entresto


- Long-term continuation of full anticoagulation (on Eliquis) is suggested


-Guideline directed medical therapy for systolic heart failure 


-On Entresto


-On Aldactone


-Add Coreg








# paroxysmal atrial fibrillation


- Telemetry


- currently heart rate is controlled


- Eliquis 5 mg b.i.d.


- Echo 30% ejection fraction





# acute chest pain, ruled out ACS


- EKG revealed sinus rhythm, IVCD with nonspecific ST changes


- Tele reveals sinus rhythm and occasions of atrial fibrillation with RVR


- troponins are under normal limits


- on aspirin 81 mg and atorvastatin 40 mg


- pacemaker interrogation unremarkable


- cardio on board





# obstructive sleep apnea


- BiPAP at night





# hypokalemia


- adequately replaced


- on potassium 10 mEq daily


- Follow-up electrolytes and kidney function tests and correct abnormalities.  

Keep potassium above 4 and magnesium above 2





# microcytic hypochromic anemia


- iron panel, ferritin pending


- monitor H&H





#Obesity


- lifestyle modification counseling, dietary habits counseling.





Case discussed with Dr. Hess 


Goals of care discussed with the patient for 36 minutes 


Code status:  Full code





Plan discussed with:  Patient





Date of Service:  Jan 5, 2025


Billing Provider:  TATIANNA RILEY MD


Common Visit Codes:  [id number] INP/OBS CARE(HIGH)











ARMAAN PONCE RESIDENT       Jan 5, 2025 21:01


TATIANNA RILEY MD                Jan 6, 2025 09:01

## 2025-01-05 NOTE — DVHPN2
Progress Note - Dictate


Date Seen:  Jan 5, 2025


Medical Necessity Reason


Pt with a Central, PICC or Fol:  No


vital signs





                                   Vital Sign








  Date Time  Temp Pulse Resp B/P (MAP) Pulse Ox O2 Delivery O2 Flow Rate FiO2


 


1/5/25 09:00 97.4 97 18 118/83 (95) 97   





 97.4       


 


1/5/25 07:12      Nasal Cannula* 6 44














                           Total Intake and Output   


 


 1/4/25 1/4/25 1/5/25





 15:00 23:00 07:00


 


Intake Total 150 ml 800 ml 650 ml


 


Output Total  600 ml 


 


Balance 150 ml 200 ml 650 ml








medications





                               Current Medications








 Medications  Dose


 Ordered  Sig/Prabha


 Route  Start Time


 Stop Time Status Last Admin


Dose Admin


 


 Nitroglycerin  0.4 mg  Q5MINP  PRN


 SL  1/1/25 22:00


     





 


 Morphine Sulfate  2 mg  Q30M  PRN


 IV  1/1/25 22:00


     





 


 Levofloxacin/


 Dextrose  150 ml @ 


 100 mls/hr  DAILY


 IV  1/2/25 10:00


    1/5/25 09:32


100 MLS/HR


 


 Albuterol  2.5 mg  Q6HR


 NEB  1/2/25 00:00


    1/5/25 07:10


2.5 MG


 


 Ipratropium


 Bromide  0.5 mg  Q6HR


 NEB  1/2/25 00:00


    1/5/25 07:11


0.5 MG


 


 Potassium Chloride  10 meq  DAILY


 PO  1/2/25 10:00


    1/5/25 09:43


10 MEQ


 


 Aspirin  81 mg  DAILY


 PO  1/2/25 10:00


    1/5/25 09:41


81 MG


 


 Atorvastatin


 Calcium  40 mg  HS


 PO  1/2/25 22:00


    1/4/25 21:37


40 MG


 


 Famotidine  20 mg  DAILY


 IV  1/2/25 10:00


    1/5/25 09:38


20 MG


 


 Guaifenesin  200 mg  Q4HP  PRN


 PO  1/2/25 13:00


    1/2/25 15:45


200 MG


 


 Apixaban  5 mg  BID


 PO  1/2/25 22:00


    1/5/25 09:43


5 MG


 


 Iron Sucrose  110 ml @ 


 110 mls/hr  DAILY@1200


 IV  1/4/25 12:00


 1/8/25 12:59  1/4/25 12:05


110 MLS/HR


 


 Furosemide  40 mg  BIDD


 IV  1/3/25 18:00


    1/5/25 05:41


40 MG


 


 Ketorolac


 Tromethamine  15 mg  Q6HPRN  PRN


 IV  1/3/25 15:45


 1/8/25 15:44  1/4/25 10:31


15 MG


 


 Sacubitril/


 Valsartan  1 tab  BID


 PO  1/4/25 10:00


    1/5/25 09:43


1 TAB


 


 Spironolactone  12.5 mg  DAILY


 PO  1/4/25 11:15


    1/5/25 09:42


12.5 MG


 


 Methylprednisolone


 Sodium Succinate  40 mg  DAILY


 IV  1/5/25 10:00


    1/5/25 09:45


40 MG


 


 Zolpidem Tartrate  5 mg  HSPRN  PRN


 PO  1/4/25 14:00


    1/4/25 21:37


5 MG








laboratory and microbiology


                                Laboratory Tests


1/5/25 07:00

















Test


 1/5/25


07:00 Range/Units


 


 


Serum Glucose 129 H   mg/dL








Assessment/Plan


Patient is a 64-year-old female who presented with few days of worsening 

shortness of breath.  She mentions that she got sick from her grand children who

were sick also.  She also mentions that she had an ammonia around Thanksgiving. 

Cardiology is involved for cardiac aspects of care.  Patient is known to our 

practice from outside on before.  Patient does have history of systolic heart 

failure for which has ICD (Biotronik).  History also includes paroxysmal AFib 

for which she is on Eliquis.  She denies palpitations.  She does complain of 

pleuritic type chest pain during coughing.  She did have some coryza.  





Not in acute distress.  Not using accessory muscles of breathing.  Mucosa is 

pink and wet.  No carotid bruit.  No JVD.  Chest:  Scattered rhonchi in the 

lungs is heard.  Cardiac:  Regular, no thrill.  Systolic murmur in the apex is 

heard.  Abdomen is soft.  There is no gross mass/hepatomegaly.  Extremities 

reveal 1+ edema bilateral.  Dorsalis pedis is 2+ bilateral.





Past medical history includes hypertension, hyperlipidemia, COPD, systolic heart

failure, paroxysmal AFib, status post ICD (Biotronik) implantation, questionable

coronary artery disease, obstructive sleep apnea, peripheral artery disease 

(history of right subclavian artery stenosis), diverticulosis, fatty liver, type

2 pulmonary hypertension, history of congenital heart disease (for which had 

surgery in infancy) and status post appendectomy.  She stopped methamphetamine 

many years ago.  She stopped alcohol 2 years ago.  She stopped cigarette smoking

years ago.  Denies active marijuana use


Echocardiogram of November 2022 had reported dilated 4 chambers, ejection 

fraction of 30-35%, pseudonormal LV diastolic filling, moderate MR, mild TR, 

pacing wire in right-sided chambers and right ventricular systolic pressure of 

36 mm Hg


Echocardiogram of April 2024 (performed in the office) had reported improved 

ejection fraction and moderate MR





Creatinine:  0.82 - 0.98 - 1.08 - 0.90 - 0.75 - 0.64 - 0.59


Potassium:  4.0 - 3.8 - 3.6 - 3.6 - 4.1 - 3.9 - 3.9


Troponin (high sensitive):  12 -15 - 16 


BNP: 188.82





Chest x-ray reported (images were reviewed by self): IMPRESSION: 1. Moderate 

cardiomegaly, stable 2. Findings compatible with CHF pattern, slightly improved 

since the prior study 





CT of chest revealed: Findings are suggestive of multifocal atypical infection 

with more focal consolidation developing in the left lower lobe. Cardiomegaly 

and likely chronic CHF.





EKG revealed sinus rhythm, IVCD with nonspecific ST changes





Tele reveals sinus rhythm and occasions of atrial fibrillation with RVR





Echocardiogram reported: Left ventricle:  Dilated left ventricle with reduced 

systolic function.  LVEF was around 30%.  Diffuse hypokinesis of left ventricle 

was seen. Right ventricle was mildly dilated with reduced systolic function.  

Both atria were dilated.  Pacing wire was seen in the right-sided chambers.  

Aortic valve was trileaflet.  There was no aortic insufficiency/stenosis.  There

was mild-to-moderate mitral/tricuspid regurgitation.  There was mild pulmonary 

valve insufficiency.  Right ventricular systolic pressure was assessed around 40

mm Hg.  There was no pericardial effusion.





Minicabster interrogation revealed: Battery status:MOS2; Remaining battery 

capacity:  31%; DDD-CLS:  70/130; Sensing amplitude:  A 3.3/RV 12.6 mV; Pacing 

threshold:  A 0.8/RV 0.7 volts; Pacing impedance:  A 581/ Ohms; Shock 

impedance:  RV 91 Ohms; Pacing in a/V:  87/1%; No VF/VT; Normal functioning ICD





Patient is a 64-year-old female who presented with worsening shortness of breath

and acute respiratory failure.  Presentation is in favor of 

bronchitis/pneumonia.  COPD exacerbation could have contributed to the clinical 

picture.  Patient does have history of systolic heart failure and component of 

acute on chronic systolic heart failure secondary to intercurrent disease could 

have contributed to the clinical picture.  Patient did have some sick contact 

which could have contributed to the clinical picture.  It is of note that the 

patient does have substance abuse but stopped many years ago.  Patient also has 

had congenital heart disease for which had surgery during infancy.  Patient is 

also obese which could have contributed to the clinical picture. Echo revealed 

reduced EF. Started on Entresto. Is evaluated by Pulmonary.  


Acute respiratory failure 


Pneumonia, community-acquired 


COPD exacerbation 


Acute on chronic systolic heart failure


Congenital heart disease 


Hypertension


Hyperlipidemia 


History of fatty liver 


Peripheral artery disease 


Obstructive sleep apnea 


Morbid obesity





Cardiac suggestion for management:





Manage on telemetry


Gentle diuresis is suggested


Follow-up electrolytes and kidney function tests and correct abnormalities.  

Keep potassium above 4 and magnesium above 2





Pulmonary evaluation for management of COPD exacerbation is suggested 





Management of pneumonia (antibiotics...) as per primary team/Pulmonary





Long-term continuation of full anticoagulation (on Eliquis) is suggested


Guideline directed medical therapy for systolic heart failure 


On Entresto


On Aldactone


Add Coreg





Further evaluation and management depends on the above and clinical course 





A total of 55 minutes was spent reviewing the patient record, examining the 

patient, making a diagnostic and therapeutic plan, discussing this plan with 

medical personnel, following up on diagnostic studies and following the patient 

for clinical stability excluding any and all procedures.  At least 50% of this 

time was spent in direct, face-to-face contact.





Thank you for allowing me to participate in this patient's care.


Further recommendations will depend on patient's clinical course.


Please do not hesitate to contact me if you have any questions or concerns.





This medical document was created using electronic medical record system with 

[employer] computerized dictation system.  Although this document has been carefully

reviewed, there may still be some phonetic and typographical errors.  These 

areas are purely typographical due to the imperfection of the software programs,

and do not reflect any compromise in the patient's medical care.


Plan discussed with:  Patient, Other (nurse)











CELY TELLO MD                 Jan 5, 2025 10:16

## 2025-01-06 VITALS
SYSTOLIC BLOOD PRESSURE: 98 MMHG | OXYGEN SATURATION: 95 % | TEMPERATURE: 98 F | DIASTOLIC BLOOD PRESSURE: 55 MMHG | RESPIRATION RATE: 20 BRPM | HEART RATE: 100 BPM

## 2025-01-06 VITALS
RESPIRATION RATE: 18 BRPM | OXYGEN SATURATION: 95 % | DIASTOLIC BLOOD PRESSURE: 57 MMHG | SYSTOLIC BLOOD PRESSURE: 98 MMHG | HEART RATE: 87 BPM | TEMPERATURE: 97.4 F

## 2025-01-06 VITALS — RESPIRATION RATE: 18 BRPM | HEART RATE: 110 BPM | OXYGEN SATURATION: 98 %

## 2025-01-06 VITALS
OXYGEN SATURATION: 97 % | DIASTOLIC BLOOD PRESSURE: 35 MMHG | SYSTOLIC BLOOD PRESSURE: 98 MMHG | RESPIRATION RATE: 18 BRPM | HEART RATE: 108 BPM

## 2025-01-06 VITALS
HEART RATE: 100 BPM | DIASTOLIC BLOOD PRESSURE: 62 MMHG | RESPIRATION RATE: 20 BRPM | TEMPERATURE: 97.7 F | OXYGEN SATURATION: 97 % | SYSTOLIC BLOOD PRESSURE: 108 MMHG

## 2025-01-06 VITALS — HEART RATE: 101 BPM | RESPIRATION RATE: 18 BRPM | OXYGEN SATURATION: 97 %

## 2025-01-06 VITALS — HEART RATE: 122 BPM

## 2025-01-06 VITALS
OXYGEN SATURATION: 95 % | SYSTOLIC BLOOD PRESSURE: 105 MMHG | RESPIRATION RATE: 18 BRPM | TEMPERATURE: 97.6 F | DIASTOLIC BLOOD PRESSURE: 64 MMHG | HEART RATE: 92 BPM

## 2025-01-06 VITALS — OXYGEN SATURATION: 90 % | RESPIRATION RATE: 18 BRPM | HEART RATE: 102 BPM

## 2025-01-06 VITALS — OXYGEN SATURATION: 98 % | HEART RATE: 94 BPM | RESPIRATION RATE: 18 BRPM

## 2025-01-06 VITALS — HEART RATE: 90 BPM | OXYGEN SATURATION: 98 % | RESPIRATION RATE: 16 BRPM

## 2025-01-06 VITALS
RESPIRATION RATE: 16 BRPM | SYSTOLIC BLOOD PRESSURE: 98 MMHG | OXYGEN SATURATION: 95 % | HEART RATE: 115 BPM | DIASTOLIC BLOOD PRESSURE: 62 MMHG | TEMPERATURE: 97.6 F

## 2025-01-06 VITALS — OXYGEN SATURATION: 98 % | HEART RATE: 96 BPM | RESPIRATION RATE: 16 BRPM

## 2025-01-06 VITALS — HEART RATE: 92 BPM | RESPIRATION RATE: 16 BRPM | OXYGEN SATURATION: 99 %

## 2025-01-06 VITALS — RESPIRATION RATE: 18 BRPM | OXYGEN SATURATION: 97 % | HEART RATE: 101 BPM

## 2025-01-06 VITALS — HEART RATE: 98 BPM

## 2025-01-06 VITALS — HEART RATE: 100 BPM | OXYGEN SATURATION: 97 % | RESPIRATION RATE: 18 BRPM

## 2025-01-06 VITALS — OXYGEN SATURATION: 97 %

## 2025-01-06 LAB
ANION GAP SERPL CALCULATED.3IONS-SCNC: 8 MMOL/L (ref 5–15)
BUN SERPL-MCNC: 14 MG/DL (ref 9–23)
BUN/CREAT SERPL: 21.2 (ref 10–20)
CALCIUM SERPL-MCNC: 9.4 MG/DL (ref 8.7–10.4)
CHLORIDE SERPL-SCNC: 104 MMOL/L (ref 98–107)
CO2 SERPL-SCNC: 32 MMOL/L (ref 20–31)
GLUCOSE SERPL-MCNC: 120 MG/DL (ref 74–106)
HCT VFR BLD AUTO: 36.2 % (ref 36–46)
HGB BLD-MCNC: 11.8 G/DL (ref 12.2–16.2)
MCH RBC QN AUTO: 23.5 PG (ref 28–32)
MCV RBC AUTO: 72.2 FL (ref 80–100)
NRBC BLD QL AUTO: 0.2 %
POTASSIUM SERPL-SCNC: 3.6 MMOL/L (ref 3.5–5.1)
SODIUM SERPL-SCNC: 144 MMOL/L (ref 136–145)

## 2025-01-06 RX ADMIN — POTASSIUM BICARBONATE ONE MEQ: 978 TABLET, EFFERVESCENT ORAL at 11:48

## 2025-01-06 RX ADMIN — ONDANSETRON HYDROCHLORIDE ONE MG: 2 INJECTION, SOLUTION INTRAMUSCULAR; INTRAVENOUS at 17:26

## 2025-01-06 RX ADMIN — BISACODYL ONE MG: 5 TABLET, DELAYED RELEASE ORAL at 17:22

## 2025-01-06 NOTE — DVHPNRES
Progress Note


Date Seen:  Jan 6, 2025


Resident Creating Document:  ARMAAN PONCE RESIDENT


Medical Necessity Reason


Pt with a Central, PICC or Fol:  No





Subjective


Review of Systems


64-year-old female with a past medical history of heart failure with reduced 

ejection fraction, atrial fibrillation, COPD, coronary artery disease , 

congenital heart disease s/p surgery at 10 months of age came to the ED with a 

chief complaint of worsening shortness of breath for about a week.  Patient was 

admitted to the hospital 1 month ago for a similar complaint of shortness of 

breath was discharged on oral antibiotic azithromycin.  Patient reported she was

feeling well since about a week ago when she went to see her daughter where her 

grand kids were sick with flu-like symptoms following which she also developed 

cold, congestion, dry cough, sore throat, fever and headache and subsequently 

started to feel short of breath.  Patient reports to be using oxygen at home 2 

L, for the last 7 years usually at night with the CPAP machine as she reports 

severe sleep apnea





Patient examined at bedside. patient also reported mild chest tenderness that 

exacerbates on palpation on the left side, pleuritic type chest pain also during

coughing. 


Patient will continue on telemetry monitor .  Continue on current IV antibiotic 

therapy, and oxygen supplementation , currently on 3 L via nasal cannula.


Patient was evaluated by Cardiology, patient will need to continue on Eliquis 5 

mg b.i.d., echo is current pending.  


Pacemaker interrogation is unremarkable


Monitor electrolytes keep potassium above 4 and magnesium above 2. 


Recent low blood pressure episode, Entresto and Lasix were hold.


Pending pulmonology consultation 


Cardiology on board


-.Long-term continuation of full anticoagulation (on Eliquis) is suggested


- Guideline directed medical therapy for systolic heart failure 


- On Entresto


- On Aldactone


- metoprolol 25 mg daily


- We will monitor the HR and BP closely





Pulmonology:


- Remains on 3 liters/minute via nasal cannula


- Taper o2 as tolerated


- BiPAP PRN.





Objective


vital signs





                                   Vital Sign








  Date Time  Temp Pulse Resp B/P (MAP) Pulse Ox O2 Delivery O2 Flow Rate FiO2


 


1/6/25 17:26    111/67    


 


1/6/25 17:00 97.6 115 16  95   





 97.6       


 


1/6/25 12:32      Nasal Cannula* 3 32














                           Total Intake and Output   


 


 1/5/25 1/5/25 1/6/25





 15:00 23:00 07:00


 


Intake Total 260 ml 500 ml 550 ml


 


Output Total  200 ml 900 ml


 


Balance 260 ml 300 ml -350 ml








medications





                               Current Medications








 Medications  Dose


 Ordered  Sig/Prabha


 Route  Start Time


 Stop Time Status Last Admin


Dose Admin


 


 Nitroglycerin  0.4 mg  Q5MINP  PRN


 SL  1/1/25 22:00


     





 


 Morphine Sulfate  2 mg  Q30M  PRN


 IV  1/1/25 22:00


     





 


 Levofloxacin/


 Dextrose  150 ml @ 


 100 mls/hr  DAILY


 IV  1/2/25 10:00


    1/6/25 11:53


100 MLS/HR


 


 Albuterol  2.5 mg  Q6HR


 NEB  1/2/25 00:00


    1/6/25 12:32


2.5 MG


 


 Ipratropium


 Bromide  0.5 mg  Q6HR


 NEB  1/2/25 00:00


    1/6/25 12:32


0.5 MG


 


 Potassium Chloride  10 meq  DAILY


 PO  1/2/25 10:00


    1/6/25 10:00


10 MEQ


 


 Aspirin  81 mg  DAILY


 PO  1/2/25 10:00


    1/6/25 11:44


81 MG


 


 Atorvastatin


 Calcium  40 mg  HS


 PO  1/2/25 22:00


    1/5/25 22:14


40 MG


 


 Famotidine  20 mg  DAILY


 IV  1/2/25 10:00


    1/6/25 11:40


20 MG


 


 Guaifenesin  200 mg  Q4HP  PRN


 PO  1/2/25 13:00


    1/2/25 15:45


200 MG


 


 Apixaban  5 mg  BID


 PO  1/2/25 22:00


    1/6/25 11:44


5 MG


 


 Iron Sucrose  110 ml @ 


 110 mls/hr  DAILY@1200


 IV  1/4/25 12:00


 1/8/25 12:59  1/6/25 14:11


110 MLS/HR


 


 Furosemide  40 mg  BIDD


 IV  1/3/25 18:00


    1/5/25 18:27


40 MG


 


 Ketorolac


 Tromethamine  15 mg  Q6HPRN  PRN


 IV  1/3/25 15:45


 1/8/25 15:44  1/5/25 16:02


15 MG


 


 Sacubitril/


 Valsartan  1 tab  BID


 PO  1/4/25 10:00


    1/6/25 11:43


1 TAB


 


 Spironolactone  12.5 mg  DAILY


 PO  1/4/25 11:15


    1/6/25 11:44


12.5 MG


 


 Methylprednisolone


 Sodium Succinate  40 mg  DAILY


 IV  1/5/25 10:00


    1/6/25 11:23


40 MG


 


 Zolpidem Tartrate  5 mg  HSPRN  PRN


 PO  1/4/25 14:00


    1/5/25 22:21


5 MG


 


 Metoprolol


 Succinate  25 mg  DAILY


 PO  1/7/25 10:00


     











Examination:  GENERAL:Normal, HEENT:Normal, NECK:Normal, LUNGS:Normal, 

CVS:Abnormal, ABDOMEN:Normal, MSK:Normal, SKIN:Normal, NEURO:Normal


laboratory and microbiology


                                Laboratory Tests


1/6/25 06:38

















Test


 1/6/25


06:38 Range/Units


 


 


Serum Glucose 120 H   mg/dL








                                  Microbiology








 Date/Time


Source Procedure


Growth Status





 


 1/2/25 13:35


Sputum Gram Stain - Final


 Complete


 


 1/2/25 13:35


Sputum Respiratory Culture - Final


 Complete


 


 1/2/25 07:28


Nose MRSA Screen - Final


 Complete


 


 1/1/25 14:30


Blood Blood Culture - Final


NO GROWTH AFTER 5 DAYS OF INCUBATION. Complete











Problem List/Assessment/Plan


Problem List/Assessment/Plan


# acute hypoxic respiratory failure likely multifactorial due to acute on 

chronic systolic heart failure ,? CAP, COPD exacerbation


# complicated post viral pneumonia likely due to Gram+/-versus atypical bacteria


#multifocal atypical infection with more focal consolidation developing in the 

left lower lobe.


- on 3 L oxygen via nasal cannula


- chest x-ray shows bilateral opacities with the obstruction of the left CP 

angle, increased pulmonary vascular marking, interstitial pulmonary edema


- on levofloxacin 750 mg q.d. IV


- echo pending, previous echo in 2022 shows 30-35% EF, concentric LVH, dilated 

LV, diffuse hypokinesis of left ventricle


- Lasix 40 mg IV Daily, on hold


- Entresto


- Long-term continuation of full anticoagulation (on Eliquis) is suggested


- Guideline directed medical therapy for systolic heart failure 


- On Entresto


- On Aldactone


- Metoprolol 25 mg daily





# paroxysmal atrial fibrillation


- Telemetry


- currently heart rate is controlled


- Eliquis 5 mg b.i.d.


- Echo 30% ejection fraction





# acute chest pain, ruled out ACS


- EKG revealed sinus rhythm, IVCD with nonspecific ST changes


- Tele reveals sinus rhythm and occasions of atrial fibrillation with RVR


- troponins are under normal limits


- on aspirin 81 mg and atorvastatin 40 mg


- pacemaker interrogation unremarkable


- cardio on board





# obstructive sleep apnea


- BiPAP at night





# hypokalemia


- adequately replaced


- on potassium 10 mEq daily


- Follow-up electrolytes and kidney function tests and correct abnormalities.  

Keep potassium above 4 and magnesium above 2





# microcytic hypochromic anemia


- iron panel, ferritin pending


- monitor H&H





#Obesity


- lifestyle modification counseling, dietary habits counseling.





Case discussed with Dr. Hess 


Goals of care discussed with the patient for 36 minutes 


Code status:  Full code





Plan discussed with:  Patient





My Orders


My Orders





                       Orders - ARMAAN PONCE RESIDENT








Procedure Category Date Status





  Time 


 


Metoprolol  Xl PHA 1/7/25 In Process





Succinate (Toprol Xl)  10:00 











Date of Service:  Jan 6, 2025


Billing Provider:  VANE AYOUB MD


Common Visit Codes:  81172-[id number] INP/OBS CARE(HIGH)











ARMAAN PONCE RESIDENT       Jan 6, 2025 18:41


VANE AYOUB MD                  Jan 6, 2025 19:37

## 2025-01-06 NOTE — DVHPN2
Progress Note - Dictate


Date Seen:  Jan 6, 2025


Medical Necessity Reason


Pt with a Central, PICC or Fol:  No


Subjective


Patient seen and examined at bedside.


Remains on supplemental oxygen


Overnight events reviewed.


vital signs





                                   Vital Sign








  Date Time  Temp Pulse Resp B/P (MAP) Pulse Ox O2 Delivery O2 Flow Rate FiO2


 


1/6/25 20:00      Nasal Cannula* 3 32


 


1/6/25 19:22  110 18  98   


 


1/6/25 17:26    111/67    


 


1/6/25 17:00 97.6       





 97.6       














                           Total Intake and Output   


 


 1/5/25 1/5/25 1/6/25





 15:00 23:00 07:00


 


Intake Total 260 ml 500 ml 550 ml


 


Output Total  200 ml 900 ml


 


Balance 260 ml 300 ml -350 ml








medications





                               Current Medications








 Medications  Dose


 Ordered  Sig/Prabha


 Route  Start Time


 Stop Time Status Last Admin


Dose Admin


 


 Nitroglycerin  0.4 mg  Q5MINP  PRN


 SL  1/1/25 22:00


     





 


 Morphine Sulfate  2 mg  Q30M  PRN


 IV  1/1/25 22:00


     





 


 Levofloxacin/


 Dextrose  150 ml @ 


 100 mls/hr  DAILY


 IV  1/2/25 10:00


    1/6/25 11:53


100 MLS/HR


 


 Albuterol  2.5 mg  Q6HR


 NEB  1/2/25 00:00


    1/6/25 19:12


2.5 MG


 


 Ipratropium


 Bromide  0.5 mg  Q6HR


 NEB  1/2/25 00:00


    1/6/25 19:12


0.5 MG


 


 Potassium Chloride  10 meq  DAILY


 PO  1/2/25 10:00


    1/6/25 10:00


10 MEQ


 


 Aspirin  81 mg  DAILY


 PO  1/2/25 10:00


    1/6/25 11:44


81 MG


 


 Atorvastatin


 Calcium  40 mg  HS


 PO  1/2/25 22:00


    1/6/25 21:16


40 MG


 


 Famotidine  20 mg  DAILY


 IV  1/2/25 10:00


    1/6/25 11:40


20 MG


 


 Guaifenesin  200 mg  Q4HP  PRN


 PO  1/2/25 13:00


    1/2/25 15:45


200 MG


 


 Apixaban  5 mg  BID


 PO  1/2/25 22:00


    1/6/25 21:16


5 MG


 


 Iron Sucrose  110 ml @ 


 110 mls/hr  DAILY@1200


 IV  1/4/25 12:00


 1/8/25 12:59  1/6/25 14:11


110 MLS/HR


 


 Furosemide  40 mg  BIDD


 IV  1/3/25 18:00


    1/5/25 18:27


40 MG


 


 Ketorolac


 Tromethamine  15 mg  Q6HPRN  PRN


 IV  1/3/25 15:45


 1/8/25 15:44  1/5/25 16:02


15 MG


 


 Sacubitril/


 Valsartan  1 tab  BID


 PO  1/4/25 10:00


    1/6/25 21:16


1 TAB


 


 Spironolactone  12.5 mg  DAILY


 PO  1/4/25 11:15


    1/6/25 11:44


12.5 MG


 


 Methylprednisolone


 Sodium Succinate  40 mg  DAILY


 IV  1/5/25 10:00


    1/6/25 11:23


40 MG


 


 Zolpidem Tartrate  5 mg  HSPRN  PRN


 PO  1/4/25 14:00


    1/5/25 22:21


5 MG


 


 Metoprolol


 Succinate  25 mg  DAILY


 PO  1/7/25 10:00


     











objective


Gen.: Patient lying in bed in no apparent distress. On supplemental oxygen.


Head: Normocephalic, atraumatic.


Eyes: EOMI/PERRLA.


Ears: Normal hearing. Normal anatomy.


Neck/trachea: Trachea midline, supple.


Nose: Normal external anatomy.


Mouth: Moist mucous membranes.


Chest: Decreased air entry bilaterally. No wheezing or rhonchi.


Cardiovascular: Positive S1, positive S2. Regular rate and rhythm.


Abdomen: Positive bowel sounds in all 4 quadrants. Soft, non-tender, non-

distended.


: Deferred.


Rectal: Deferred.


Skin: Warm, dry. Intact.


Extremities: 2+ radial pulses bilaterally. No lower extremity edema.


Neuro: Awake, alert, oriented x3. No gross motor or sensory deficits. Cranial 

nerves II through XII intact. Gait not assessed.


laboratory and microbiology


                                Laboratory Tests


1/6/25 06:38

















Test


 1/6/25


06:38 Range/Units


 


 


Serum Glucose 120 H   mg/dL








Assessment/Plan


Impression:


Acute COPD exacerbation


Chronic hypoxic respiratory failure


Dependence on supplemental oxygen


Hx of nicotine dependence


Obstructive sleep apnea


Morbid obesity BMI 61.9





Events:


Supplemental oxygen


Remains on 3 liters/minute via nasal cannula


Taper o2 as tolerated


BiPAP PRN.





Atrial flutter- Cardiology recommendations appreciated.





Continue antibiotics 


Continue bronchodilators


IV steroids


Antitussive as needed 





Monitor hemoglobin 


Iron supplementation 





Rest of plan as outlined below





Plan:


Supplemental oxygen 


Titrate to keep O2 sats above 92%.





Continue bronchodilators


Continue antibiotics


Continue steroids.


Incentive spirometry





IV fluid hydration


Monitor renal function.


Monitor electrolytes. Supplement as necessary.


Monitor ins and outs.





DVT prophylaxis.





Prognosis: Poor given patient's multiple co-morbidities.





Rest of plan per hospitalist and other consultants.





Thank you, Dr. Nguyen, for allowing me to participate in this patient's care.


Further recommendations will depend on the patient's clinical course.


Please do not hesitate to contact me if you have any questions or concerns.





This medical document was created using an electronic medical record system with

Dragon computerized dictation system. Although these documentations are being 

carefully reviewed, there may still be some phonetic and typographical changes. 

The errors are purely typographical, due to imperfection on the software 

program, and do not reflect any compromise in the patient's medical care.


Plan discussed with:  Patient, Other (JOVITA Jeronimo)











GARTH PIERRE MD              Jan 6, 2025 23:12

## 2025-01-06 NOTE — DVHPN2
Progress Note - Dictate


Date Seen:  Jan 6, 2025


Medical Necessity Reason


Pt with a Central, PICC or Fol:  No


vital signs





                                   Vital Sign








  Date Time  Temp Pulse Resp B/P (MAP) Pulse Ox O2 Delivery O2 Flow Rate FiO2


 


1/6/25 06:40  92 16  99   


 


1/6/25 06:34      Nasal Cannula 3.0 


 


1/6/25 06:34        32


 


1/6/25 05:55    98/55    


 


1/6/25 05:00 98.0       





 98.0       














                           Total Intake and Output   


 


 1/5/25 1/5/25 1/6/25





 15:00 23:00 07:00


 


Intake Total 260 ml 500 ml 550 ml


 


Output Total  200 ml 900 ml


 


Balance 260 ml 300 ml -350 ml








medications





                               Current Medications








 Medications  Dose


 Ordered  Sig/Prabha


 Route  Start Time


 Stop Time Status Last Admin


Dose Admin


 


 Nitroglycerin  0.4 mg  Q5MINP  PRN


 SL  1/1/25 22:00


     





 


 Morphine Sulfate  2 mg  Q30M  PRN


 IV  1/1/25 22:00


     





 


 Levofloxacin/


 Dextrose  150 ml @ 


 100 mls/hr  DAILY


 IV  1/2/25 10:00


    1/5/25 09:32


100 MLS/HR


 


 Albuterol  2.5 mg  Q6HR


 NEB  1/2/25 00:00


    1/6/25 06:35


2.5 MG


 


 Ipratropium


 Bromide  0.5 mg  Q6HR


 NEB  1/2/25 00:00


    1/6/25 06:34


0.5 MG


 


 Potassium Chloride  10 meq  DAILY


 PO  1/2/25 10:00


    1/5/25 09:43


10 MEQ


 


 Aspirin  81 mg  DAILY


 PO  1/2/25 10:00


    1/5/25 09:41


81 MG


 


 Atorvastatin


 Calcium  40 mg  HS


 PO  1/2/25 22:00


    1/5/25 22:14


40 MG


 


 Famotidine  20 mg  DAILY


 IV  1/2/25 10:00


    1/5/25 09:38


20 MG


 


 Guaifenesin  200 mg  Q4HP  PRN


 PO  1/2/25 13:00


    1/2/25 15:45


200 MG


 


 Apixaban  5 mg  BID


 PO  1/2/25 22:00


    1/5/25 22:15


5 MG


 


 Iron Sucrose  110 ml @ 


 110 mls/hr  DAILY@1200


 IV  1/4/25 12:00


 1/8/25 12:59  1/5/25 13:40


110 MLS/HR


 


 Furosemide  40 mg  BIDD


 IV  1/3/25 18:00


    1/5/25 18:27


40 MG


 


 Ketorolac


 Tromethamine  15 mg  Q6HPRN  PRN


 IV  1/3/25 15:45


 1/8/25 15:44  1/5/25 16:02


15 MG


 


 Sacubitril/


 Valsartan  1 tab  BID


 PO  1/4/25 10:00


    1/5/25 22:13


1 TAB


 


 Spironolactone  12.5 mg  DAILY


 PO  1/4/25 11:15


    1/5/25 09:42


12.5 MG


 


 Methylprednisolone


 Sodium Succinate  40 mg  DAILY


 IV  1/5/25 10:00


    1/5/25 09:45


40 MG


 


 Zolpidem Tartrate  5 mg  HSPRN  PRN


 PO  1/4/25 14:00


    1/5/25 22:21


5 MG


 


 Carvedilol  3.125 mg  Q12HR


 PO  1/5/25 22:00


    1/5/25 22:14


3.125 MG








laboratory and microbiology











Test


 1/6/25


06:38 Range/Units


 


 


Serum Glucose Pending   








Assessment/Plan


Patient is a 64-year-old female who presented with few days of worsening 

shortness of breath.  She mentions that she got sick from her grand children who

were sick also.  She also mentions that she had an ammonia around Thanksgiving. 

Cardiology is involved for cardiac aspects of care.  Patient is known to our 

practice from outside on before.  Patient does have history of systolic heart 

failure for which has ICD (Biotronik).  History also includes paroxysmal AFib 

for which she is on Eliquis.  She denies palpitations.  She does complain of 

pleuritic type chest pain during coughing.  She did have some coryza.  





Not in acute distress.  Not using accessory muscles of breathing.  Mucosa is 

pink and wet.  No carotid bruit.  No JVD.  Chest:  Scattered rhonchi in the 

lungs is heard.  Cardiac:  Regular, no thrill.  Systolic murmur in the apex is 

heard.  Abdomen is soft.  There is no gross mass/hepatomegaly.  Extremities 

reveal 1+ edema bilateral.  Dorsalis pedis is 2+ bilateral.





Past medical history includes hypertension, hyperlipidemia, COPD, systolic heart

failure, paroxysmal AFib (on Eliquis), status post ICD (Biotronik) implantation,

questionable coronary artery disease, obstructive sleep apnea, peripheral artery

disease (history of right subclavian artery stenosis), diverticulosis, fatty 

liver, type 2 pulmonary hypertension, history of congenital heart disease (for 

which had surgery in infancy) and status post appendectomy.  She stopped 

methamphetamine many years ago.  She stopped alcohol 2 years ago.  She stopped 

cigarette smoking years ago.  Denies active marijuana use


Echocardiogram of November 2022 had reported dilated 4 chambers, ejection 

fraction of 30-35%, pseudonormal LV diastolic filling, moderate MR, mild TR, 

pacing wire in right-sided chambers and right ventricular systolic pressure of 

36 mm Hg


Echocardiogram of April 2024 (performed in the office) had reported improved 

ejection fraction and moderate MR





Creatinine:  0.82 - 0.98 - 1.08 - 0.90 - 0.75 - 0.64 - 0.59 - today's pending


Potassium:  4.0 - 3.8 - 3.6 - 3.6 - 4.1 - 3.9 - 3.9 - today's pending


Troponin (high sensitive):  12 -15 - 16 


BNP: 188.82





Chest x-ray reported (images were reviewed by self): IMPRESSION: 1. Moderate 

cardiomegaly, stable 2. Findings compatible with CHF pattern, slightly improved 

since the prior study 





CT of chest revealed: Findings are suggestive of multifocal atypical infection 

with more focal consolidation developing in the left lower lobe. Cardiomegaly 

and likely chronic CHF.





EKG revealed sinus rhythm, IVCD with nonspecific ST changes





Tele reveals sinus rhythm and occasions of atrial fibrillation with RVR





Echocardiogram reported: Left ventricle:  Dilated left ventricle with reduced 

systolic function.  LVEF was around 30%.  Diffuse hypokinesis of left ventricle 

was seen. Right ventricle was mildly dilated with reduced systolic function.  

Both atria were dilated.  Pacing wire was seen in the right-sided chambers.  

Aortic valve was trileaflet.  There was no aortic insufficiency/stenosis.  There

was mild-to-moderate mitral/tricuspid regurgitation.  There was mild pulmonary 

valve insufficiency.  Right ventricular systolic pressure was assessed around 40

mm Hg.  There was no pericardial effusion.





Biotronik interrogation revealed: Battery status:MOS2; Remaining battery cap

acity:  31%; DDD-CLS:  70/130; Sensing amplitude:  A 3.3/RV 12.6 mV; Pacing 

threshold:  A 0.8/RV 0.7 volts; Pacing impedance:  A 581/ Ohms; Shock 

impedance:  RV 91 Ohms; Pacing in a/V:  87/1%; No VF/VT; Normal functioning ICD





Patient is a 64-year-old female who presented with worsening shortness of breath

and acute respiratory failure.  Presentation is in favor of bronchitis

/pneumonia.  COPD exacerbation could have contributed to the clinical picture.  

Patient does have history of systolic heart failure and component of acute on 

chronic systolic heart failure secondary to intercurrent disease could have 

contributed to the clinical picture.  Patient did have some sick contact which 

could have contributed to the clinical picture.  It is of note that the patient 

does have substance abuse but stopped many years ago.  Patient also has had 

congenital heart disease for which had surgery during infancy.  Patient is also 

obese which could have contributed to the clinical picture. Echo revealed 

reduced EF. Started on Entresto. Is evaluated by Pulmonary.  


Acute respiratory failure 


Pneumonia, community-acquired 


COPD exacerbation 


Acute on chronic systolic heart failure


Congenital heart disease 


Hypertension


Hyperlipidemia 


History of fatty liver 


Peripheral artery disease 


Obstructive sleep apnea 


Morbid obesity


Atrial fibrillation





Cardiac suggestion for management:





Manage on telemetry


Gentle diuresis is suggested


Follow-up electrolytes and kidney function tests and correct abnormalities.  

Keep potassium above 4 and magnesium above 2





Pulmonary evaluation for management of COPD exacerbation is suggested 





Management of pneumonia (antibiotics...) as per primary team/Pulmonary





Long-term continuation of full anticoagulation (on Eliquis) is suggested


Guideline directed medical therapy for systolic heart failure 


On Entresto


On Aldactone


On Coreg





Cardiac wise, stable





Further evaluation and management depends on the above and clinical course 





A total of 55 minutes was spent reviewing the patient record, examining the 

patient, making a diagnostic and therapeutic plan, discussing this plan with 

medical personnel, following up on diagnostic studies and following the patient 

for clinical stability excluding any and all procedures.  At least 50% of this 

time was spent in direct, face-to-face contact.





Thank you for allowing me to participate in this patient's care.


Further recommendations will depend on patient's clinical course.


Please do not hesitate to contact me if you have any questions or concerns.





This medical document was created using electronic medical record system with 

naaya dictation system.  Although this document has been carefully

reviewed, there may still be some phonetic and typographical errors.  These 

areas are purely typographical due to the imperfection of the software programs,

and do not reflect any compromise in the patient's medical care.


Plan discussed with:  Patient, Other (nurse)











CELY TELLO MD                 Jan 6, 2025 07:52

## 2025-01-07 VITALS
DIASTOLIC BLOOD PRESSURE: 71 MMHG | RESPIRATION RATE: 19 BRPM | SYSTOLIC BLOOD PRESSURE: 125 MMHG | TEMPERATURE: 98 F | OXYGEN SATURATION: 92 % | HEART RATE: 63 BPM

## 2025-01-07 VITALS
DIASTOLIC BLOOD PRESSURE: 50 MMHG | RESPIRATION RATE: 18 BRPM | SYSTOLIC BLOOD PRESSURE: 85 MMHG | HEART RATE: 92 BPM | TEMPERATURE: 98.1 F | OXYGEN SATURATION: 96 %

## 2025-01-07 VITALS — OXYGEN SATURATION: 98 % | HEART RATE: 93 BPM | RESPIRATION RATE: 20 BRPM

## 2025-01-07 VITALS
RESPIRATION RATE: 17 BRPM | HEART RATE: 84 BPM | SYSTOLIC BLOOD PRESSURE: 100 MMHG | OXYGEN SATURATION: 96 % | DIASTOLIC BLOOD PRESSURE: 62 MMHG | TEMPERATURE: 98.2 F

## 2025-01-07 VITALS — RESPIRATION RATE: 16 BRPM | OXYGEN SATURATION: 89 % | HEART RATE: 89 BPM

## 2025-01-07 VITALS
HEART RATE: 84 BPM | DIASTOLIC BLOOD PRESSURE: 62 MMHG | RESPIRATION RATE: 17 BRPM | TEMPERATURE: 98.2 F | SYSTOLIC BLOOD PRESSURE: 100 MMHG | OXYGEN SATURATION: 96 %

## 2025-01-07 VITALS — RESPIRATION RATE: 20 BRPM | OXYGEN SATURATION: 93 % | HEART RATE: 110 BPM

## 2025-01-07 VITALS
DIASTOLIC BLOOD PRESSURE: 35 MMHG | HEART RATE: 108 BPM | SYSTOLIC BLOOD PRESSURE: 98 MMHG | OXYGEN SATURATION: 97 % | RESPIRATION RATE: 18 BRPM

## 2025-01-07 VITALS — HEART RATE: 119 BPM

## 2025-01-07 VITALS — OXYGEN SATURATION: 92 %

## 2025-01-07 VITALS — OXYGEN SATURATION: 99 % | HEART RATE: 89 BPM | RESPIRATION RATE: 16 BRPM

## 2025-01-07 LAB
ANION GAP SERPL CALCULATED.3IONS-SCNC: 5 MMOL/L (ref 5–15)
BUN SERPL-MCNC: 14 MG/DL (ref 9–23)
BUN/CREAT SERPL: 24.1 (ref 10–20)
CALCIUM SERPL-MCNC: 9.3 MG/DL (ref 8.7–10.4)
CHLORIDE SERPL-SCNC: 106 MMOL/L (ref 98–107)
CO2 SERPL-SCNC: 32 MMOL/L (ref 20–31)
GLUCOSE SERPL-MCNC: 126 MG/DL (ref 74–106)
POTASSIUM SERPL-SCNC: 4.3 MMOL/L (ref 3.5–5.1)
SODIUM SERPL-SCNC: 143 MMOL/L (ref 136–145)

## 2025-01-07 RX ADMIN — METOPROLOL SUCCINATE SCH MG: 50 TABLET, EXTENDED RELEASE ORAL at 10:00

## 2025-01-07 NOTE — DVHPN2
Progress Note - Dictate


Date Seen:  Jan 7, 2025


Medical Necessity Reason


Pt with a Central, PICC or Fol:  No


vital signs





                                   Vital Sign








  Date Time  Temp Pulse Resp B/P (MAP) Pulse Ox O2 Delivery O2 Flow Rate FiO2


 


1/7/25 07:05  93 20  98   


 


1/7/25 06:59      Nasal Cannula* 3 32


 


1/7/25 05:51    80/50    


 


1/7/25 05:00 98.1       





 98.1       














                           Total Intake and Output   


 


 1/6/25 1/6/25 1/7/25





 15:00 23:00 07:00


 


Intake Total 150 ml 610 ml 600 ml


 


Output Total  600 ml 


 


Balance 150 ml 10 ml 600 ml








medications





                               Current Medications








 Medications  Dose


 Ordered  Sig/Prabha


 Route  Start Time


 Stop Time Status Last Admin


Dose Admin


 


 Nitroglycerin  0.4 mg  Q5MINP  PRN


 SL  1/1/25 22:00


     





 


 Morphine Sulfate  2 mg  Q30M  PRN


 IV  1/1/25 22:00


     





 


 Levofloxacin/


 Dextrose  150 ml @ 


 100 mls/hr  DAILY


 IV  1/2/25 10:00


    1/6/25 11:53


100 MLS/HR


 


 Albuterol  2.5 mg  Q6HR


 NEB  1/2/25 00:00


    1/7/25 07:00


2.5 MG


 


 Ipratropium


 Bromide  0.5 mg  Q6HR


 NEB  1/2/25 00:00


    1/7/25 06:59


0.5 MG


 


 Potassium Chloride  10 meq  DAILY


 PO  1/2/25 10:00


    1/6/25 10:00


10 MEQ


 


 Aspirin  81 mg  DAILY


 PO  1/2/25 10:00


    1/6/25 11:44


81 MG


 


 Atorvastatin


 Calcium  40 mg  HS


 PO  1/2/25 22:00


    1/6/25 21:16


40 MG


 


 Famotidine  20 mg  DAILY


 IV  1/2/25 10:00


    1/6/25 11:40


20 MG


 


 Guaifenesin  200 mg  Q4HP  PRN


 PO  1/2/25 13:00


    1/2/25 15:45


200 MG


 


 Apixaban  5 mg  BID


 PO  1/2/25 22:00


    1/6/25 21:16


5 MG


 


 Iron Sucrose  110 ml @ 


 110 mls/hr  DAILY@1200


 IV  1/4/25 12:00


 1/8/25 12:59  1/6/25 14:11


110 MLS/HR


 


 Furosemide  40 mg  BIDD


 IV  1/3/25 18:00


    1/5/25 18:27


40 MG


 


 Ketorolac


 Tromethamine  15 mg  Q6HPRN  PRN


 IV  1/3/25 15:45


 1/8/25 15:44  1/5/25 16:02


15 MG


 


 Sacubitril/


 Valsartan  1 tab  BID


 PO  1/4/25 10:00


    1/6/25 21:16


1 TAB


 


 Spironolactone  12.5 mg  DAILY


 PO  1/4/25 11:15


    1/6/25 11:44


12.5 MG


 


 Methylprednisolone


 Sodium Succinate  40 mg  DAILY


 IV  1/5/25 10:00


    1/6/25 11:23


40 MG


 


 Zolpidem Tartrate  5 mg  HSPRN  PRN


 PO  1/4/25 14:00


    1/5/25 22:21


5 MG


 


 Metoprolol


 Succinate  25 mg  DAILY


 PO  1/7/25 10:00


     











laboratory and microbiology


                                Laboratory Tests


1/7/25 04:57








1/6/25 06:38

















Test


 1/7/25


04:57 Range/Units


 


 


Serum Glucose 126 H   mg/dL








Assessment/Plan


Patient is a 64-year-old female who presented with few days of worsening 

shortness of breath.  She mentions that she got sick from her grand children who

were sick also.  She also mentions that she had an ammonia around Thanksgiving. 

Cardiology is involved for cardiac aspects of care.  Patient is known to our 

practice from outside on before.  Patient does have history of systolic heart 

failure for which has ICD (Biotronik).  History also includes paroxysmal AFib 

for which she is on Eliquis.  She denies palpitations.  She does complain of 

pleuritic type chest pain during coughing.  She did have some coryza.  





Not in acute distress.  Not using accessory muscles of breathing.  Mucosa is 

pink and wet.  No carotid bruit.  No JVD.  Chest:  Scattered rhonchi in the 

lungs is heard.  Cardiac:  Regular, no thrill.  Systolic murmur in the apex is 

heard.  Abdomen is soft.  There is no gross mass/hepatomegaly.  Extremities 

reveal 1+ edema bilateral.  Dorsalis pedis is 2+ bilateral.





Past medical history includes hypertension, hyperlipidemia, COPD, systolic heart

failure, paroxysmal AFib (on Eliquis), status post ICD (Biotronik) implantation,

questionable coronary artery disease, obstructive sleep apnea, peripheral artery

disease (history of right subclavian artery stenosis), diverticulosis, fatty 

liver, type 2 pulmonary hypertension, history of congenital heart disease (for 

which had surgery in infancy) and status post appendectomy.  She stopped 

methamphetamine many years ago.  She stopped alcohol 2 years ago.  She stopped 

cigarette smoking years ago.  Denies active marijuana use


Echocardiogram of November 2022 had reported dilated 4 chambers, ejection 

fraction of 30-35%, pseudonormal LV diastolic filling, moderate MR, mild TR, 

pacing wire in right-sided chambers and right ventricular systolic pressure of 

36 mm Hg


Echocardiogram of April 2024 (performed in the office) had reported improved 

ejection fraction and moderate MR





Creatinine:  0.82 - 0.98 - 1.08 - 0.90 - 0.75 - 0.64 - 0.59 - 0.66 - 0.58 


Potassium:  4.0 - 3.8 - 3.6 - 3.6 - 4.1 - 3.9 - 3.9 - 3.6 - 4.3 


Troponin (high sensitive):  12 -15 - 16 


BNP: 188.82





Chest x-ray reported (images were reviewed by self): IMPRESSION: 1. Moderate 

cardiomegaly, stable 2. Findings compatible with CHF pattern, slightly improved 

since the prior study 





CT of chest revealed: Findings are suggestive of multifocal atypical infection 

with more focal consolidation developing in the left lower lobe. Cardiomegaly 

and likely chronic CHF.





EKG revealed sinus rhythm, IVCD with nonspecific ST changes





Tele reveals sinus rhythm and occasions of atrial fibrillation with RVR





Echocardiogram reported: Left ventricle:  Dilated left ventricle with reduced 

systolic function.  LVEF was around 30%.  Diffuse hypokinesis of left ventricle 

was seen. Right ventricle was mildly dilated with reduced systolic function.  

Both atria were dilated.  Pacing wire was seen in the right-sided chambers.  

Aortic valve was trileaflet.  There was no aortic insufficiency/stenosis.  There

was mild-to-moderate mitral/tricuspid regurgitation.  There was mild pulmonary 

valve insufficiency.  Right ventricular systolic pressure was assessed around 40

mm Hg.  There was no pericardial effusion.





[email or web address] Report interrogation revealed: Battery status:MOS2; Remaining battery 

capacity:  31%; DDD-CLS:  70/130; Sensing amplitude:  A 3.3/RV 12.6 mV; Pacing 

threshold:  A 0.8/RV 0.7 volts; Pacing impedance:  A 581/ Ohms; Shock 

impedance:  RV 91 Ohms; Pacing in a/V:  87/1%; No VF/VT; Normal functioning ICD





Patient is a 64-year-old female who presented with worsening shortness of breath

and acute respiratory failure.  Presentation is in favor of 

bronchitis/pneumonia.  COPD exacerbation could have contributed to the clinical 

picture.  Patient does have history of systolic heart failure and component of 

acute on chronic systolic heart failure secondary to intercurrent disease could 

have contributed to the clinical picture.  Patient did have some sick contact 

which could have contributed to the clinical picture.  It is of note that the 

patient does have substance abuse but stopped many years ago.  Patient also has 

had congenital heart disease for which had surgery during infancy.  Patient is 

also obese which could have contributed to the clinical picture. Echo revealed 

reduced EF. Started on Entresto. Is evaluated by Pulmonary.  


Acute respiratory failure 


Pneumonia, community-acquired 


COPD exacerbation 


Acute on chronic systolic heart failure


Congenital heart disease 


Hypertension


Hyperlipidemia 


History of fatty liver 


Peripheral artery disease 


Obstructive sleep apnea 


Morbid obesity


Atrial fibrillation





Cardiac suggestion for management:





Manage on telemetry


Gentle diuresis is suggested


Follow-up electrolytes and kidney function tests and correct abnormalities.  

Keep potassium above 4 and magnesium above 2





Pulmonary evaluation for management of COPD exacerbation is suggested 





Management of pneumonia (antibiotics...) as per primary team/Pulmonary





Long-term continuation of full anticoagulation (on Eliquis) is suggested


Guideline directed medical therapy for systolic heart failure 


On Entresto


On Aldactone


On Toprol-XL





Cardiac wise, stable





Further evaluation and management depends on the above and clinical course 





A total of 55 minutes was spent reviewing the patient record, examining the 

patient, making a diagnostic and therapeutic plan, discussing this plan with 

medical personnel, following up on diagnostic studies and following the patient 

for clinical stability excluding any and all procedures.  At least 50% of this 

time was spent in direct, face-to-face contact.





Thank you for allowing me to participate in this patient's care.


Further recommendations will depend on patient's clinical course.


Please do not hesitate to contact me if you have any questions or concerns.





This medical document was created using electronic medical record system with 

[employer] dictation system.  Although this document has been carefully

reviewed, there may still be some phonetic and typographical errors.  These 

areas are purely typographical due to the imperfection of the software programs,

and do not reflect any compromise in the patient's medical care.


Plan discussed with:  Patient, Other (nurse)











CELY TELLO MD                 Jan 7, 2025 08:25

## 2025-01-07 NOTE — DVHPN2
Progress Note - Dictate


Date Seen:  Jan 7, 2025


Medical Necessity Reason


Pt with a Central, PICC or Fol:  No


Subjective


Patient seen and examined at bedside.


Remains on supplemental oxygen


Overnight events reviewed.


vital signs





                                   Vital Sign








  Date Time  Temp Pulse Resp B/P (MAP) Pulse Ox O2 Delivery O2 Flow Rate FiO2


 


1/7/25 14:25 98.2 84 17  96   


 


1/7/25 13:00    100/62 (75)    


 


1/7/25 11:40      Room Air* 0 21














                           Total Intake and Output   


 


 1/6/25 1/6/25 1/7/25





 15:00 23:00 07:00


 


Intake Total 150 ml 610 ml 600 ml


 


Output Total  600 ml 


 


Balance 150 ml 10 ml 600 ml








objective


Gen.: Patient lying in bed in no apparent distress. On supplemental oxygen.


Head: Normocephalic, atraumatic.


Eyes: EOMI/PERRLA.


Ears: Normal hearing. Normal anatomy.


Neck/trachea: Trachea midline, supple.


Nose: Normal external anatomy.


Mouth: Moist mucous membranes.


Chest: Decreased air entry bilaterally. No wheezing or rhonchi.


Cardiovascular: Positive S1, positive S2. Regular rate and rhythm.


Abdomen: Positive bowel sounds in all 4 quadrants. Soft, non-tender, non-

distended.


: Deferred.


Rectal: Deferred.


Skin: Warm, dry. Intact.


Extremities: 2+ radial pulses bilaterally. No lower extremity edema.


Neuro: Awake, alert, oriented x3. No gross motor or sensory deficits. Cranial 

nerves II through XII intact. Gait not assessed.


laboratory and microbiology


                                Laboratory Tests


1/7/25 04:57








1/6/25 06:38

















Test


 1/7/25


04:57 Range/Units


 


 


Serum Glucose 126 H   mg/dL








Assessment/Plan


Impression:


Acute COPD exacerbation


Chronic hypoxic respiratory failure


Dependence on supplemental oxygen


Hx of nicotine dependence


Obstructive sleep apnea


Morbid obesity BMI 61.9





Events:


Supplemental oxygen


Remains on 3 liters/minute via nasal cannula


Taper o2 as tolerated


BiPAP PRN.





Complete antibiotics 


Continue bronchodilators PRN


Antitussive as needed 





Eliquis


Monitor hemoglobin 


Iron supplementation 





Maintain euvolemia w/ Lasix


Monitor renal function





Patient is stable for discharge from the pulmonary standpoint.


Follow up in 1-2 weeks in Pulmonary Clinic.





Rest of plan as outlined below





Plan:


Supplemental oxygen 


Titrate to keep O2 sats above 92%.





Continue bronchodilators


Continue antibiotics


Continue steroids.


Incentive spirometry





IV fluid hydration


Monitor renal function.


Monitor electrolytes. Supplement as necessary.


Monitor ins and outs.





DVT prophylaxis.





Prognosis: Poor given patient's multiple co-morbidities.





Rest of plan per hospitalist and other consultants.





Thank you, Dr. Nguyen, for allowing me to participate in this patient's care.


Further recommendations will depend on the patient's clinical course.


Please do not hesitate to contact me if you have any questions or concerns.





This medical document was created using an electronic medical record system with

Dragon computerized dictation system. Although these documentations are being 

carefully reviewed, there may still be some phonetic and typographical changes. 

The errors are purely typographical, due to imperfection on the software 

program, and do not reflect any compromise in the patient's medical care.


Plan discussed with:  Patient, Other (JOVITA Jeronimo)











GARTH PIERRE MD              Jan 7, 2025 22:37

## 2025-01-07 NOTE — DVHDSRES
Discharge Summary


Date of Admission


Resident Creating Document:  ARMAAN PONCE RESIDENT


2025 at 21:47





Date of Discharge:


2025





Admitting Diagnosis


Acute hypoxemic respiratory failure





Labs/Diagnostic Data:





                               Laboratory Results








Test


 25


04:57 25


06:38 25


14:30 25


09:55


 


Sodium Level


 143 mmol/L


(136-145) 


 


 





 


Potassium Level


 4.3 mmol/L


(3.5-5.1) 


 


 





 


Chloride Level


 106 mmol/L


() 


 


 





 


Carbon Dioxide Level


 32 mmol/L


(20-31) 


 


 





 


Anion Gap 5 (5-15)    


 


Blood Urea Nitrogen


 14 mg/dL


(9-23) 


 


 





 


Creatinine


 0.58 mg/dL


(0.550-1.02) 


 


 





 


Glomerular Filtration Rate


Calc 102 mL/min


(>90) 


 


 





 


BUN/Creatinine Ratio


 24.1


(10.0-20.0) 


 


 





 


Serum Glucose


 126 mg/dL


() 


 


 





 


Calcium Level


 9.3 mg/dL


(8.7-10.4) 


 


 





 


White Blood Count


 


 8.0 10^3/uL


(4.4-10.8) 


 





 


Red Blood Count


 


 5.01 10^6/uL


(4.0-5.20) 


 





 


Hemoglobin


 


 11.8 g/dL


(12.2-16.2) 


 





 


Hematocrit


 


 36.2 %


(36.0-46.0) 


 





 


Mean Corpuscular Volume


 


 72.2 fL


(80.0-100.0) 


 





 


Mean Corpuscular Hemoglobin


 


 23.5 pg


(28.0-32.0) 


 





 


Mean Corpuscular Hemoglobin


Concent 


 32.5 g/dL


(32.0-36.0) 


 





 


Red Cell Distribution Width


 


 16.6 %


(11.8-14.3) 


 





 


Platelet Count


 


 257 10^3/uL


(140-450) 


 





 


Mean Platelet Volume


 


 7.1 fL


(6.9-10.8) 


 





 


Neutrophils (%) (Auto)


 


 64.6 %


(37.0-80.0) 


 





 


Lymphocytes (%) (Auto)


 


 27.4 %


(10.0-50.0) 


 





 


Monocytes (%) (Auto)


 


 7.5 %


(0.0-12.0) 


 





 


Eosinophils (%) (Auto)


 


 0.3 %


(0.0-7.0) 


 





 


Basophils (%) (Auto)


 


 0.2 %


(0.0-2.0) 


 





 


Neutrophils # (Auto)


 


 5.2 10 ^3/uL


(1.6-8.6) 


 





 


Lymphocytes # (Auto)


 


 2.2 10 ^3/uL


(0.4-5.4) 


 





 


Monocytes # (Auto)


 


 0.6 10 ^3/uL


(0-1.3) 


 





 


Eosinophils # (Auto)


 


 0 10 ^3/uL


(0-0.8) 


 





 


Basophils # (Auto)


 


 0 10 ^3/uL


(0-0.2) 


 





 


Nucleated Red Blood Cells  0.2 %   


 


Magnesium Level


 


 1.9 mg/dL


(1.6-2.6) 


 





 


Total Bilirubin


 


 


 0.2 mg/dL


(0.2-1.0) 





 


Aspartate Amino Transferase


(AST) 


 


 21 U/L (13-40) 


 





 


Alanine Aminotransferase (ALT)   11 U/L (7-40)  


 


Alkaline Phosphatase


 


 


 63 U/L


() 





 


Total Protein


 


 


 6.4 g/dL


(5.7-8.2) 





 


Albumin


 


 


 4.1 g/dL


(3.2-4.8) 





 


Iron Level


 


 


 


 19 ug/dL


()


 


Total Iron Binding Capacity


 


 


 


 335 ug/dL


(250-425)


 


Percent Iron Saturation    5.7 % (15-50) 


 


Ferritin


 


 


 


 84.7 ng/mL


()


 


Test


 25


22:23 25


17:30 25


17:13 25


15:31


 


Erythrocyte Sedimentation Rate


 14 mm/hr


(0-20) 


 


 





 


C-Reactive Protein High


Sensitivity 7.79 mg/dL


(<1.0) 


 


 





 


Lipase 51 U/L (12-53)    


 


Troponin I High Sensitivity


 


 16 ng/L


(</=34) 


 





 


Lactic Acid Level


 


 


 0.9 mmol/L


(0.4-2.0) 





 


Influenza Type A Antigen


 


 


 


 Negative


(Negative)


 


Influenza Type B Antigen


 


 


 


 Negative


(Negative)


 


SARS-CoV-2 Antigen (Rapid)


 


 


 


 Negative


(NEGATIVE)


 


Test


 25


15:29 25


14:33 


 





 


Urine Color Straw (Yellow)    


 


Urine Clarity Clear (Clear)    


 


Urine pH 5.5 (5.0-9.0)    


 


Urine Specific Gravity


 1.009


(1.001-1.035) 


 


 





 


Urine Protein


 Negative


(Negative) 


 


 





 


Urine Ketones


 Negative


(Negative) 


 


 





 


Urine Blood


 Negative /uL


(Negative) 


 


 





 


Urine Nitrite


 Negative


(Negative) 


 


 





 


Urine Bilirubin


 Negative


(Negative) 


 


 





 


Urine Urobilinogen


 Normal mg/dL


(Negative) 


 


 





 


Urine Leukocyte Esterase


 Negative /uL


(Negative) 


 


 





 


Urine RBC


 None seen /hpf


(0 - 4) 


 


 





 


Urine WBC


 <1 /hpf (0 -


5) 


 


 





 


Urine Squamous Epithelial


Cells Few /hpf (<5) 


 


 


 





 


Urine Bacteria


 None seen /hpf


(None Seen) 


 


 





 


Urine Hyaline Casts


 Few /lpf (0 -


2) 


 


 





 


Urine Glucose


 Normal mg/dL


(Normal) 


 


 





 


B-Type Natriuretic Peptide


 


 188.82 pg/mL


(0-100) 


 








                             Other Laboratory Tests


25 04:57








25 06:38











Brief Hx & Hospital Course:


Hospital Course:


The patient is a 64-year-old female with a history of heart failure with reduced

ejection fraction (HFrEF), COPD, coronary artery disease, and congenital heart 

disease status post-surgery at 10 months of age, who presented with worsening 

shortness of breath over the past week. Her symptoms began after exposure to 

sick grandchildren with flu-like symptoms, which led to congestion, dry cough, 

fever, sore throat, and worsening dyspnea. The patient, who normally uses oxygen

at 2 L/min and CPAP at night, required daytime oxygen due to worsening symptoms.

On admission, she reported left-sided chest wall tenderness that worsened with 

coughing and pleuritic chest pain. She was started on antibiotics for possible 

respiratory infection, and her cardiac and pulmonary conditions were closely 

monitored.





Cardiology evaluated her and noted low blood pressure, leading to the temporary 

holding of Entresto and Lasix, with plans to resume both at lower doses as 

tolerated. Pacemaker interrogation was unremarkable, and she continued Eliquis 

for atrial fibrillation. Pulmonology was consulted, and she was maintained on 3 

L/min oxygen via nasal cannula, with plans to taper oxygen as tolerated. Imaging

and labs were consistent with chronic cardiopulmonary disease without new acute 

findings. By discharge, the patient was clinically stable, reporting improvement

in symptoms and able to ambulate with minimal dyspnea.





She was discharged on guideline-directed medical therapy for HFrEF, including 

Entresto, Aldactone, and metoprolol, along with Eliquis for atrial fibrillation.

She was advised to continue oxygen at 3 L/min and taper as tolerated, use BiPAP 

as needed, and follow a low-sodium diet. She will follow up with cardiology for 

heart failure management, pulmonology for COPD, and her primary care physician 

to monitor her recovery. The patient was discharged in stable condition with 

clear instructions to return if she develops worsening shortness of breath, 

chest pain, or fluid retention.





Condition at Discharge: 


Stable on 3 L/min oxygen with no significant hypoxemia or hemodynamic 

instability. The patient understands discharge instructions and will follow up 

with outpatient care providers.





Case discussed with 


Goals of care discussed with the patient for 32 minutes.





Consults/Reason for consult


Cardiology: Chest pain, suspected unstable angina


Pulmonology: COPD exacerbation





Operations or Procedures


                             [place] [address]


                              Ph: [phone]





                               DIAGNOSTIC IMAGING


                      Diagnostic Imaging Report : [id number]


                                     Signed








PATIENT: THOMAS AVILA MACCT: [id number]        UNIT: [id number]


: 1960           LOC: ER                   ROOM / BED:  / 


AGE / SEX: 64 / F         ADM STATUS: REG ER        SERVICE DT: 25 140





ORDERING PHYSICIAN: ISIDORO PACHECO MD


PROCEDURE(s): CXRP - CHEST PORTABLE


REASON: sob


ORDER NUMBER(s): [id number], ACCESSION NUMBER(s): [id number]








CHEST RADIOGRAPH





Indication: sob





Technique: Single frontal view of the chest was obtained





COMPARISON: XY CHEST PORTABLE on DOS: 24





FINDINGS: 





Lines and Tubes: None





Lungs: Persistent diffuse interstitial prominence with partial obscuration of 

left hemidiaphragm.





Pleura: No effusion.





No pneumothorax. 





Cardiomediastinal contours: Moderate stable cardiomegaly. Pacemaker wires appear

intact.





Slight improvement the aeration of the lung fields since the previous study.





Bones: Unremarkable





IMPRESSION: 





1. Moderate cardiomegaly, stable





2. Findings compatible with CHF pattern, slightly improved since the prior study







 





Electronically Signed by: Adrian Lindsey at 2025 14:22:43 PM











DICTATED BY: ADRIAN LINDSEY MD


DICTATED DATE/TIME: 25





SIGNED BY: ADRIAN LINDSEY MD


SIGNED DATE/TIME: 25





CC: 


                             [address] [address]


                              Ph: [phone]





                               DIAGNOSTIC IMAGING


                      Diagnostic Imaging Report : [id number]


                                     Signed








PATIENT: THOMAS AVILA MACCT: [id number]        UNIT: [id number]


: 1960           LOC: [facility]           ROOM / BED: 0292T / B


AGE / SEX: 64 / F         ADM STATUS: ADM IN        SERVICE DT: 25 1143





ORDERING PHYSICIAN: CELY TELLO MD


PROCEDURE(s): CX2CT - CHEST WITHOUT CONTRAST


REASON: sob


ORDER NUMBER(s): [id number], ACCESSION NUMBER(s): [id number]








Procedure: CT CHEST WITHOUT CONTRAST





Reason for study/Clinical History: sob





Comparison Study: 2022





Exam Date: 2025 12:18 PM





TECHNIQUE: Multidetector CT of the chest was performed from the lung apices to 

the upper abdomen without the use of intravenous contract. Axial, coronal and 

sagittal multiplanar reformats were performed.





Radiation Dose Information:





CT Dose: CTDI volume is 13.99 mGy. Dose-length product is 497.47 mGy*cm





The dose indicators for CT are the volume Computed Tomography (CT) Dose Index 

(CTDIvol) and the Dose Length Product (DLP), and are measured in units of mGy 

and mGy-cm, respectively. These indicators are not patient dose, but values 

generated from the CT scanner acquisition factors. The report includes radiation

exposure data for exposures received during this examination. 





FINDINGS: 





Lower neck: Normal thyroid.





Lungs: Multifocal airspace disease with developing consolidation in the left 

lower lobe. Mild-to-moderate centrilobular emphysema.





Heart/Vascular Structures: Cardiomegaly. Coronary artery calcifications. 

Vascular calcifications of the aorta. 





Lymph Nodes: No adenopathy





Pleura: No pleural effusion or significant pneumothorax.





Musculoskeletal: No acute osseous abnormality.





Soft tissues: Left chest wall pacemaker.  Thoracic spinal stimulator device in-

situ.





Upper abdomen: Limited portions of the upper abdomen are unremarkable.





IMPRESSION: 





Findings are suggestive of multifocal atypical infection with more focal 

consolidation developing in the left lower lobe.





Cardiomegaly and likely chronic CHF.





Radiation optimization: All CT scans at this facility use at least one of these 

dose optimization techniques: automated exposure control  mA and/or kV 

adjustment per patient size (includes targeted exams where dose is matched to 

clinical indication)  or iterative reconstruction.





Electronically Signed by: Samm Silvestre at 2025 12:43:32 PM











DICTATED BY: SAMM SILVESTRE MD


DICTATED DATE/TIME: 25 1243





SIGNED BY: SAMM SILVESTRE MD


SIGNED DATE/TIME: 25 1243





CC:





Condition at Discharge:


Fair





Final Diagnosis/Problems List





# acute hypoxic respiratory failure likely multifactorial due to acute on 


chronic systolic heart failure ,? CAP, COPD exacerbation


# complicated post viral pneumonia likely due to Gram+/-versus atypical 


bacteria


#multifocal atypical infection with more focal consolidation developing in 


the left lower lobe.





# paroxysmal atrial fibrillation


# acute chest pain, ruled out ACS


# obstructive sleep apnea


# hypokalemia


# microcytic hypochromic anemia





Discharge Disposition:


Home





SNF Discharge


Will this Physician continue t:  No





Discharge Instruct/Medications


Diet:  Cardiac 2g Na,low cholest


Activity:  No Restrictions, As Tolerated


Follow Up/Referral:  


follow up with PCP within 2 weeks





follow up with cardiology within 2 weeks


Medications:  


script to pharmacy


Discharge Statement:


"Patient was advised to return to the ER or call 911 if any headaches, 

dizziness, shortness of breath, chest pain, abdominal pain, bleeding, fevers, or

worsening of medical condition.





Patient was counseled about treatment plan, medications, possible side effects, 

patientverbalized understanding. All questions were answered to the best of my 

ability.





This discharge took greater then 30 minutes in planning, reviewing 

documentation, counseling the patient, and discussing with other team members."





ASSESSMENT


# acute hypoxic respiratory failure likely multifactorial due to acute on 

chronic systolic heart failure ,? CAP, COPD exacerbation


# complicated post viral pneumonia likely due to Gram+/-versus atypical bacteria


#multifocal atypical infection with more focal consolidation developing in the 

left lower lobe.


# paroxysmal atrial fibrillation


# acute chest pain, ruled out ACS


# obstructive sleep apnea


# hypokalemia


# microcytic hypochromic anemia





Date of Service:  2025


Billing Provider:  VANE AYOUB MD


Common Visit Codes:  19542-[facility]/OBS DISCH DAY >30min











ARMAAN PONCE RESIDENT       2025 17:19


VANE AYOUB MD                  2025 20:46

## 2025-01-07 NOTE — CONS
Pharmacy Clinical Information:


From Heart Failure Fallout Report on CQM Application, 


Ezequiel Katerine JUAREZ is a 64 year old female with PMH of Afib, COPD, CAD, HFrEF 

(LVEF 30%).


Her home medications for heart failure include losartan, carvedilol, furosemide.




Her inpatient medications include metoprolol succinate (being held due to soft 

BP), sacubitril/valsartan, spironolactone. 


Consider holding off the initation of SGLT2i due to hypotension.











SHERRI RODRÍGUEZ PHARMACIST        Jan 7, 2025 13:49